# Patient Record
Sex: MALE | Race: WHITE | NOT HISPANIC OR LATINO | Employment: OTHER | URBAN - METROPOLITAN AREA
[De-identification: names, ages, dates, MRNs, and addresses within clinical notes are randomized per-mention and may not be internally consistent; named-entity substitution may affect disease eponyms.]

---

## 2017-01-14 ENCOUNTER — HOSPITAL ENCOUNTER (OUTPATIENT)
Dept: LAB | Facility: MEDICAL CENTER | Age: 82
End: 2017-01-14
Attending: NURSE PRACTITIONER
Payer: MEDICARE

## 2017-01-14 DIAGNOSIS — R53.1 GENERALIZED WEAKNESS: ICD-10-CM

## 2017-01-14 DIAGNOSIS — E11.9 CONTROLLED TYPE 2 DIABETES MELLITUS WITHOUT COMPLICATION, WITHOUT LONG-TERM CURRENT USE OF INSULIN (HCC): ICD-10-CM

## 2017-01-14 DIAGNOSIS — E46 PROTEIN CALORIE MALNUTRITION (HCC): ICD-10-CM

## 2017-01-14 LAB
ALBUMIN SERPL BCP-MCNC: 4.1 G/DL (ref 3.2–4.9)
ALBUMIN/GLOB SERPL: 1.1 G/DL
ALP SERPL-CCNC: 69 U/L (ref 30–99)
ALT SERPL-CCNC: 31 U/L (ref 2–50)
ANION GAP SERPL CALC-SCNC: 7 MMOL/L (ref 0–11.9)
AST SERPL-CCNC: 25 U/L (ref 12–45)
BASOPHILS # BLD AUTO: 0.08 K/UL (ref 0–0.12)
BASOPHILS NFR BLD AUTO: 1.6 % (ref 0–1.8)
BILIRUB SERPL-MCNC: 0.3 MG/DL (ref 0.1–1.5)
BUN SERPL-MCNC: 19 MG/DL (ref 8–22)
CALCIUM SERPL-MCNC: 9.6 MG/DL (ref 8.5–10.5)
CHLORIDE SERPL-SCNC: 97 MMOL/L (ref 96–112)
CO2 SERPL-SCNC: 28 MMOL/L (ref 20–33)
CREAT SERPL-MCNC: 1.4 MG/DL (ref 0.5–1.4)
EOSINOPHIL # BLD: 0.26 K/UL (ref 0–0.51)
EOSINOPHIL NFR BLD AUTO: 5.1 % (ref 0–6.9)
ERYTHROCYTE [DISTWIDTH] IN BLOOD BY AUTOMATED COUNT: 44 FL (ref 35.9–50)
EST. AVERAGE GLUCOSE BLD GHB EST-MCNC: 126 MG/DL
GLOBULIN SER CALC-MCNC: 3.9 G/DL (ref 1.9–3.5)
GLUCOSE SERPL-MCNC: 146 MG/DL (ref 65–99)
HBA1C MFR BLD: 6 % (ref 0–5.6)
HCT VFR BLD AUTO: 33.4 % (ref 42–52)
HGB BLD-MCNC: 10.6 G/DL (ref 14–18)
IMM GRANULOCYTES # BLD AUTO: 0.08 K/UL (ref 0–0.11)
IMM GRANULOCYTES NFR BLD AUTO: 1.6 % (ref 0–0.9)
LYMPHOCYTES # BLD: 1.14 K/UL (ref 1–4.8)
LYMPHOCYTES NFR BLD AUTO: 22.5 % (ref 22–41)
MCH RBC QN AUTO: 29.5 PG (ref 27–33)
MCHC RBC AUTO-ENTMCNC: 31.7 G/DL (ref 33.7–35.3)
MCV RBC AUTO: 93 FL (ref 81.4–97.8)
MONOCYTES # BLD: 0.63 K/UL (ref 0–0.85)
MONOCYTES NFR BLD AUTO: 12.4 % (ref 0–13.4)
NEUTROPHILS # BLD: 2.88 K/UL (ref 1.82–7.42)
NEUTROPHILS NFR BLD AUTO: 56.8 % (ref 44–72)
NRBC # BLD AUTO: 0 K/UL
NRBC BLD-RTO: 0 /100 WBC
PLATELET # BLD AUTO: 316 K/UL (ref 164–446)
PMV BLD AUTO: 9.1 FL (ref 9–12.9)
POTASSIUM SERPL-SCNC: 4.7 MMOL/L (ref 3.6–5.5)
PROT SERPL-MCNC: 8 G/DL (ref 6–8.2)
RBC # BLD AUTO: 3.59 M/UL (ref 4.7–6.1)
SODIUM SERPL-SCNC: 132 MMOL/L (ref 135–145)
WBC # BLD AUTO: 5.1 K/UL (ref 4.8–10.8)

## 2017-01-14 PROCEDURE — 85025 COMPLETE CBC W/AUTO DIFF WBC: CPT

## 2017-01-14 PROCEDURE — 36415 COLL VENOUS BLD VENIPUNCTURE: CPT

## 2017-01-14 PROCEDURE — 83036 HEMOGLOBIN GLYCOSYLATED A1C: CPT

## 2017-01-14 PROCEDURE — 80053 COMPREHEN METABOLIC PANEL: CPT

## 2017-01-19 DIAGNOSIS — G47.09 OTHER INSOMNIA: ICD-10-CM

## 2017-01-19 DIAGNOSIS — G89.29 OTHER CHRONIC PAIN: ICD-10-CM

## 2017-01-19 DIAGNOSIS — M25.50 MULTIPLE JOINT PAIN: ICD-10-CM

## 2017-01-19 RX ORDER — QUETIAPINE FUMARATE 300 MG/1
300 TABLET, FILM COATED ORAL EVERY EVENING
Qty: 90 TAB | Refills: 0 | Status: SHIPPED | OUTPATIENT
Start: 2017-01-19 | End: 2017-02-24 | Stop reason: SDUPTHER

## 2017-01-19 RX ORDER — OXYCODONE HYDROCHLORIDE 10 MG/1
10 TABLET ORAL EVERY 6 HOURS PRN
Qty: 90 TAB | Refills: 0 | Status: SHIPPED | OUTPATIENT
Start: 2017-01-19 | End: 2017-01-26 | Stop reason: SDUPTHER

## 2017-01-19 NOTE — TELEPHONE ENCOUNTER
Was the patient seen in the last year in this department? Yes     Does patient have an active prescription for medications requested? No     Received Request Via: Patient     [place]  Oxy-12/31/16

## 2017-01-21 ENCOUNTER — OFFICE VISIT (OUTPATIENT)
Dept: URGENT CARE | Facility: CLINIC | Age: 82
End: 2017-01-21
Payer: MEDICARE

## 2017-01-21 ENCOUNTER — APPOINTMENT (OUTPATIENT)
Dept: RADIOLOGY | Facility: IMAGING CENTER | Age: 82
End: 2017-01-21
Attending: PHYSICIAN ASSISTANT
Payer: MEDICARE

## 2017-01-21 VITALS
OXYGEN SATURATION: 94 % | HEART RATE: 96 BPM | DIASTOLIC BLOOD PRESSURE: 80 MMHG | TEMPERATURE: 97.7 F | SYSTOLIC BLOOD PRESSURE: 130 MMHG | RESPIRATION RATE: 16 BRPM
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
## 2017-01-21 DIAGNOSIS — S79.912A INJURY OF LEFT HIP, INITIAL ENCOUNTER: ICD-10-CM

## 2017-01-21 PROCEDURE — 73501 X-RAY EXAM HIP UNI 1 VIEW: CPT | Mod: TC,LT | Performed by: PHYSICIAN ASSISTANT

## 2017-01-21 PROCEDURE — 73501 X-RAY EXAM HIP UNI 1 VIEW: CPT | Mod: 26,LT | Performed by: PHYSICIAN ASSISTANT

## 2017-01-21 PROCEDURE — 99214 OFFICE O/P EST MOD 30 MIN: CPT | Performed by: PHYSICIAN ASSISTANT

## 2017-01-21 ASSESSMENT — ENCOUNTER SYMPTOMS
SENSORY CHANGE: 0
FALLS: 1
TINGLING: 0
FOCAL WEAKNESS: 1
CONSTITUTIONAL NEGATIVE: 1
NUMBNESS: 0

## 2017-01-21 NOTE — MR AVS SNAPSHOT
Santosh Casas   2017 3:10 PM   Office Visit   MRN: [id number]    Department:  [facility]   Dept Phone:  [phone]    Description:  Male : 1934   Provider:  Johnny Mack PA-C           Reason for Visit     Fall  x 5 days, fell and landed on Lt. side.  Pain on Lt. hip and Lt. upper leg      Allergies as of 2017     Allergen Noted Reactions    Nkda [No Known Drug Allergy] 2010         You were diagnosed with     Fall, initial encounter   [123755]       Injury of left hip, initial encounter   [566114]         Vital Signs     Blood Pressure Pulse Temperature Respirations Oxygen Saturation Smoking Status    130/80 mmHg 96 36.5 °C (97.7 °F) 16 94% Former Smoker      Basic Information     Date Of Birth Sex Race Ethnicity Preferred Language    1934 Male White Non- English      Your appointments     2017  1:00 PM   Established Patient with DORINDA Dao   [address] ([address])    [address] [place] [address] [address]  [facility] [address]   [phone]           You will be receiving a confirmation call a few days before your appointment from our automated call confirmation system.              Problem List              ICD-10-CM Priority Class Noted - Resolved    GOUT    6/15/2009 - Present    Depression F32.9 Low  6/15/2009 - Present    Hypothyroidism E03.9 Low  2009 - Present    Anxiety F41.9   2010 - Present    GERD (gastroesophageal reflux disease) K21.9   2010 - Present    Hypertension I10 Low  2011 - Present    Squamous cell cancer of tongue (CMS-HCC) C02.9   2012 - Present    Insomnia G47.00   2016 - Present    Gait disorder R26.9   2016 - Present    Protein calorie malnutrition (CMS-[facility]) E46   2016 - Present    Controlled type 2 diabetes mellitus without complication, without long-term current use of insulin (CMS-[facility]) E11.9   2016 - Present      Health Maintenance        Date Due  Completion Dates    IMM DTaP/Tdap/Td Vaccine (1 - Tdap) 4/27/1953 ---    IMM ZOSTER VACCINE 4/27/1994 ---    IMM PNEUMOCOCCAL 65+ (ADULT) LOW/MEDIUM RISK SERIES (2 of 2 - PCV13) 11/23/2012 11/23/2011    RETINAL SCREENING 3/20/2015 3/20/2014, 3/20/2014    FASTING LIPID PROFILE 6/8/2016 6/8/2015, 6/23/2014, 4/12/2011, 11/9/2009, 1/12/2006    URINE ACR / MICROALBUMIN 6/8/2016 6/8/2015, 6/23/2014, 12/12/2012, 7/17/2009    IMM INFLUENZA (1) 9/1/2016 11/23/2011    A1C SCREENING 7/14/2017 1/14/2017, 8/30/2016, 1/6/2016, 6/8/2015, 6/23/2014, 12/12/2012, 11/23/2011, 4/12/2011, 2/24/2010, 11/9/2009, 7/17/2009, 1/12/2006, 1/11/2006    SERUM CREATININE 1/14/2018 1/14/2017, 11/8/2016, 6/8/2015, 8/22/2013, 8/21/2013, 7/12/2013, 12/12/2012, 12/11/2012, 5/15/2012, 5/15/2012, 11/28/2011, 11/23/2011, 11/21/2011, 2/24/2010, 12/27/2009, 1/12/2006, 1/11/2006, 1/10/2006    COLONOSCOPY 6/16/2024 6/16/2014 (N/S)    Override on 6/16/2014: (N/S)            Current Immunizations     Influenza TIV (IM) 11/23/2011 11:35 AM    Pneumococcal polysaccharide vaccine (PPSV-23) 11/23/2011 11:45 AM      Below and/or attached are the medications your provider expects you to take. Review all of your home medications and newly ordered medications with your provider and/or pharmacist. Follow medication instructions as directed by your provider and/or pharmacist. Please keep your medication list with you and share with your provider. Update the information when medications are discontinued, doses are changed, or new medications (including over-the-counter products) are added; and carry medication information at all times in the event of emergency situations     Allergies:  NKDA - (reactions not documented)               Medications  Valid as of: January 21, 2017 -  5:02 PM    Generic Name Brand Name Tablet Size Instructions for use    ALPRAZolam (Tab) XANAX 1 MG Take 1 Tab by mouth 3 times a day as needed for Anxiety.        DULoxetine HCl (Cap DR  Particles) CYMBALTA 60 MG Take 1 Cap by mouth every day.        Ferrous Sulfate (Tab) ferrous sulfate 325 (65 FE) MG Take 1 Tab by mouth every day. With 250 mg vit C and a stool softner such as colace        Levothyroxine Sodium (Tab) SYNTHROID 50 MCG Take 1 Tab by mouth Every morning on an empty stomach.        MetFORMIN HCl (Tab) GLUCOPHAGE 500 MG TAKE ONE TABLET BY MOUTH TWICE A DAY WITH MEALS        OxyCODONE HCl (Tab) ROXICODONE 10 MG Take 1 Tab by mouth every 6 hours as needed for Severe Pain.        Polyethylene Glycol 3350 (Pack) MIRALAX  Take 17 g by mouth 1 time daily as needed. Indications: Constipation        QUEtiapine Fumarate (Tab) SEROQUEL 300 MG Take 1 Tab by mouth every evening.        Sertraline HCl (Tab) ZOLOFT 100 MG Take 2 Tabs by mouth every day. For depression, at night        .                 Medicines prescribed today were sent to:     [id number] - [place], [place] - [address] [facility]    [id number] [place] [address]    Phone: [phone] Fax: [phone]    Open 24 Hours?: No      Medication refill instructions:       If your prescription bottle indicates you have medication refills left, it is not necessary to call your provider’s office. Please contact your pharmacy and they will refill your medication.    If your prescription bottle indicates you do not have any refills left, you may request refills at any time through one of the following ways: The online [employer] system (except Urgent Care), by calling your provider’s office, or by asking your pharmacy to contact your provider’s office with a refill request. Medication refills are processed only during regular business hours and may not be available until the next business day. Your provider may request additional information or to have a follow-up visit with you prior to refilling your medication.   *Please Note: Medication refills are assigned a new Rx number when refilled electronically. Your pharmacy may indicate  that no refills were authorized even though a new prescription for the same medication is available at the pharmacy. Please request the medicine by name with the pharmacy before contacting your provider for a refill.        Your To Do List     Future Labs/Procedures Complete By Expires    DX-HIP-UNILATERAL-WITH PELVIS-1 VIEW LEFT  As directed 1/21/2018      Referral     A referral request has been sent to our patient care coordination department. Please allow 3-5 business days for us to process this request and contact you either by phone or mail. If you do not hear from us by the 5th business day, please call us at [phone].        Other Notes About Your Plan     Please Assess and Status Chronic Disease from Current and Prior Visits.     Query: Please Assess the following Diagnosis    1. Suggested code 707.14 Ulcer of heel and midfoot. Diagnosis 11/12/2014 per Dr German Bush see Epic note entry of 11/230/2014 dated 11/12/2014. Please status and document if ulcer remains/treatment.     2.Suggested Code 296.30 Major depressive disorder, recurrent episode, unspecified degree. Please state if depression mild, moderate or severe. Long term history of Depression patient taking Xanax from 03/12/2009 with last refill 07/09/2015. Remeron 01/16/2009 with last Refill 06/10/2015 and Seroquel 01/05/2011 with last refill 06/12/2015. Please status diagnosis for these medicaitons.     3. 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

## 2017-01-26 ENCOUNTER — OFFICE VISIT (OUTPATIENT)
Dept: MEDICAL GROUP | Facility: MEDICAL CENTER | Age: 82
End: 2017-01-26
Payer: MEDICARE

## 2017-01-26 VITALS
SYSTOLIC BLOOD PRESSURE: 124 MMHG | HEIGHT: 72 IN | OXYGEN SATURATION: 97 % | BODY MASS INDEX: 17.88 KG/M2 | WEIGHT: 132 LBS | TEMPERATURE: 97.3 F | DIASTOLIC BLOOD PRESSURE: 68 MMHG | RESPIRATION RATE: 12 BRPM | HEART RATE: 102 BPM

## 2017-01-26 DIAGNOSIS — M25.552 PAIN IN LEFT HIP: ICD-10-CM

## 2017-01-26 DIAGNOSIS — D50.9 IRON DEFICIENCY ANEMIA: ICD-10-CM

## 2017-01-26 DIAGNOSIS — F32.89 OTHER DEPRESSION: ICD-10-CM

## 2017-01-26 DIAGNOSIS — E87.1 HYPONATREMIA: ICD-10-CM

## 2017-01-26 PROCEDURE — G8432 DEP SCR NOT DOC, RNG: HCPCS | Performed by: NURSE PRACTITIONER

## 2017-01-26 PROCEDURE — G8484 FLU IMMUNIZE NO ADMIN: HCPCS | Performed by: NURSE PRACTITIONER

## 2017-01-26 PROCEDURE — G8419 CALC BMI OUT NRM PARAM NOF/U: HCPCS | Performed by: NURSE PRACTITIONER

## 2017-01-26 PROCEDURE — 3288F FALL RISK ASSESSMENT DOCD: CPT | Performed by: NURSE PRACTITIONER

## 2017-01-26 PROCEDURE — 4040F PNEUMOC VAC/ADMIN/RCVD: CPT | Performed by: NURSE PRACTITIONER

## 2017-01-26 PROCEDURE — 1036F TOBACCO NON-USER: CPT | Performed by: NURSE PRACTITIONER

## 2017-01-26 PROCEDURE — 1100F PTFALLS ASSESS-DOCD GE2>/YR: CPT | Performed by: NURSE PRACTITIONER

## 2017-01-26 PROCEDURE — 0518F FALL PLAN OF CARE DOCD: CPT | Mod: 8P | Performed by: NURSE PRACTITIONER

## 2017-01-26 PROCEDURE — 99214 OFFICE O/P EST MOD 30 MIN: CPT | Performed by: NURSE PRACTITIONER

## 2017-01-26 RX ORDER — OXYCODONE HYDROCHLORIDE 10 MG/1
10 TABLET ORAL EVERY 4 HOURS PRN
Qty: 112 TAB | Refills: 0 | Status: SHIPPED | OUTPATIENT
Start: 2017-01-26 | End: 2017-03-17 | Stop reason: SDUPTHER

## 2017-01-26 NOTE — MR AVS SNAPSHOT
Santosh Casas   2017 1:00 PM   Office Visit   MRN: [id number]    Department:  [address]   Dept Phone:  [phone]    Description:  Male : 1934   Provider:  DORINDA Dao           Allergies as of 2017     Allergen Noted Reactions    Nkda [No Known Drug Allergy] 2010         You were diagnosed with     Pain in left hip   [901806]       Multiple joint pain   [499620]       Other chronic pain   [338.29.ICD-9-CM]         Vital Signs     Blood Pressure Pulse Temperature Respirations Height Weight    124/68 mmHg 102 36.3 °C (97.3 °F) 12 1.829 m (6') 59.875 kg (132 lb)    Body Mass Index Oxygen Saturation Smoking Status             17.90 kg/m2 97% Former Smoker         Basic Information     Date Of Birth Sex Race Ethnicity Preferred Language    1934 Male White Non- English      Problem List              ICD-10-CM Priority Class Noted - Resolved    GOUT    6/15/2009 - Present    Depression F32.9 Low  6/15/2009 - Present    Hypothyroidism E03.9 Low  2009 - Present    Anxiety F41.9   2010 - Present    GERD (gastroesophageal reflux disease) K21.9   2010 - Present    Hypertension I10 Low  2011 - Present    Squamous cell cancer of tongue (CMS-HCC) C02.9   2012 - Present    Insomnia G47.00   2016 - Present    Gait disorder R26.9   2016 - Present    Protein calorie malnutrition (CMS-HCC) E46   2016 - Present    Controlled type 2 diabetes mellitus without complication, without long-term current use of insulin (CMS-HCC) E11.9   2016 - Present      Health Maintenance        Date Due Completion Dates    IMM DTaP/Tdap/Td Vaccine (1 - Tdap) 1953 ---    IMM ZOSTER VACCINE 1994 ---    IMM PNEUMOCOCCAL 65+ (ADULT) LOW/MEDIUM RISK SERIES (2 of 2 - PCV13) 2012    RETINAL SCREENING 3/20/2015 3/20/2014, 3/20/2014    FASTING LIPID PROFILE 2016, 2014, 2011, 2009, 2006    URINE ACR / MICROALBUMIN 6/8/2016 6/8/2015, 6/23/2014, 12/12/2012, 7/17/2009    IMM INFLUENZA (1) 9/1/2016 11/23/2011    A1C SCREENING 7/14/2017 1/14/2017, 8/30/2016, 1/6/2016, 6/8/2015, 6/23/2014, 12/12/2012, 11/23/2011, 4/12/2011, 2/24/2010, 11/9/2009, 7/17/2009, 1/12/2006, 1/11/2006    SERUM CREATININE 1/14/2018 1/14/2017, 11/8/2016, 6/8/2015, 8/22/2013, 8/21/2013, 7/12/2013, 12/12/2012, 12/11/2012, 5/15/2012, 5/15/2012, 11/28/2011, 11/23/2011, 11/21/2011, 2/24/2010, 12/27/2009, 1/12/2006, 1/11/2006, 1/10/2006    COLONOSCOPY 6/16/2024 6/16/2014 (N/S)    Override on 6/16/2014: (N/S)            Current Immunizations     Influenza TIV (IM) 11/23/2011 11:35 AM    Pneumococcal polysaccharide vaccine (PPSV-23) 11/23/2011 11:45 AM      Below and/or attached are the medications your provider expects you to take. Review all of your home medications and newly ordered medications with your provider and/or pharmacist. Follow medication instructions as directed by your provider and/or pharmacist. Please keep your medication list with you and share with your provider. Update the information when medications are discontinued, doses are changed, or new medications (including over-the-counter products) are added; and carry medication information at all times in the event of emergency situations     Allergies:  NKDA - (reactions not documented)               Medications  Valid as of: January 26, 2017 -  1:20 PM    Generic Name Brand Name Tablet Size Instructions for use    DULoxetine HCl (Cap DR Particles) CYMBALTA 60 MG Take 1 Cap by mouth every day.        Ferrous Sulfate (Tab) ferrous sulfate 325 (65 FE) MG Take 1 Tab by mouth every day. With 250 mg vit C and a stool softner such as colace        Levothyroxine Sodium (Tab) SYNTHROID 50 MCG Take 1 Tab by mouth Every morning on an empty stomach.        MetFORMIN HCl (Tab) GLUCOPHAGE 500 MG TAKE ONE TABLET BY MOUTH TWICE A DAY WITH MEALS        OxyCODONE HCl (Tab) ROXICODONE 10 MG  Take 1 Tab by mouth every four hours as needed for Severe Pain.        Polyethylene Glycol 3350 (Pack) MIRALAX  Take 17 g by mouth 1 time daily as needed. Indications: Constipation        QUEtiapine Fumarate (Tab) SEROQUEL 300 MG Take 1 Tab by mouth every evening.        .                 Medicines prescribed today were sent to:     [facility] - [place], [place] - [address] [facility]    [id number] [place] [address] [place] [address]    Phone: [phone] Fax: [phone]    Open 24 Hours?: No      Medication refill instructions:       If your prescription bottle indicates you have medication refills left, it is not necessary to call your provider’s office. Please contact your pharmacy and they will refill your medication.    If your prescription bottle indicates you do not have any refills left, you may request refills at any time through one of the following ways: The online [employer] system (except Urgent Care), by calling your provider’s office, or by asking your pharmacy to contact your provider’s office with a refill request. Medication refills are processed only during regular business hours and may not be available until the next business day. Your provider may request additional information or to have a follow-up visit with you prior to refilling your medication.   *Please Note: Medication refills are assigned a new Rx number when refilled electronically. Your pharmacy may indicate that no refills were authorized even though a new prescription for the same medication is available at the pharmacy. Please request the medicine by name with the pharmacy before contacting your provider for a refill.        Other Notes About Your Plan     Please Assess and Status Chronic Disease from Current and Prior Visits.     Query: Please Assess the following Diagnosis    1. Suggested code 707.14 Ulcer of heel and midfoot. Diagnosis 11/12/2014 per Dr German Bush see Epic note entry of 11/230/2014 dated 11/12/2014. Please status  and document if ulcer remains/treatment.     2.Suggested Code 296.30 Major depressive disorder, recurrent episode, unspecified degree. Please state if depression mild, moderate or severe. Long term history of Depression patient taking Xanax from 03/12/2009 with last refill 07/09/2015. Remeron 01/16/2009 with last Refill 06/10/2015 and Seroquel 01/05/2011 with last refill 06/12/2015. Please status diagnosis for these medicaitons.     3. 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

## 2017-01-27 NOTE — PROGRESS NOTES
CC  f/u    HPI  Santosh is an 82-year-old established male here to follow up with his wife Ailyn. He is here to follow-up on a few things:  #1-hyponatremia: Chronic issue. Working on increasing his salt through eating soup. He is eating one can of soup per day. He denies any muscle cramps but always feels weak. Denies heart palpitations or chest pain. Denies diarrhea.  #2-iron deficient anemia: Chronic issue. Struggles taking iron because of constipation. Does not enjoy iron-containing foods. Always feels cold and tired. Denies feeling short of breath but he is very physically inactive.  #3-left hip pain: Patient fell a few weeks ago at home, ground level fall. He had an x-ray through urgent care that was negative for fracture. He continues with pain to his left hip during walking and any type of weightbearing. He does feel his hip pain has improved slightly over the past week. He is only walking with a walker. He denies any leg numbness or tingling. He is taking oxycodone every 6 hours but states that his pain gets pretty severe after 3-4 hours and he is requesting an increased frequency in allowance of taking oxycodone. He denies any constipation that he previously experienced with morphine.  #4-depression: Chronic issue. States that he has not had any changes in his mood. Continues on Seroquel at night and Cymbalta with dinner. He states he is very bored in life and feels apathetic. Denies any suicidal or homicidal ideations. Enjoys reading and his grandchildren.      Past medical, surgical, family, and social history is reviewed and updated in Epic chart by me today.   Medications and allergies reviewed and updated in Epic chart by me today.     ROS:   As documented in history of present illness above    Exam:  Blood pressure 124/68, pulse 102, temperature 36.3 °C (97.3 °F), resp. rate 12, height 1.829 m (6'), weight 59.875 kg (132 lb), SpO2 97 %.  Constitutional: elderly male, Alert, no distress, plus 3 vital  signs  Skin:  Warm, dry, no rashes invisible areas  Eye: Equal, round and reactive, conjunctiva clear  ENMT: Lips without lesions  Neck: Trachea midline  Respiratory: Unlabored respiration, lungs clear to auscultation, no wheezes, no rhonchi  Cardiovascular: Normal rate and rhythm  Abdomen: Soft, nontender  M/s: He experiences tenderness with palpation to his left hip and lateral , proximal femur. There is no overlying bruising, deformity or swelling to his left hip or femur area. He walks with a slight limp with his walker.  Psych: Alert, pleasant, well-groomed, normal affect    A/P:  1. Pain in left hip  -Is difficult for his wife to come  a medication and he was given a prescription to fill in February today. Offered physical therapy referral which he declined. Discussed heat, ice, elevation, rest, soft cushions to sit on. Recommend a follow-up in 2-3 weeks if pain is not dramatically improving.  - oxycodone immediate release (ROXICODONE) 10 MG immediate release tablet; Take 1 Tab by mouth every four hours as needed for Severe Pain.  Dispense: 112 Tab; Refill: 0    2. Iron deficiency anemia  -Again encouraged the patient to try at least one 325 mg iron replacement at night prior to bedtime with vitamin C. Reviewed iron rich food sources with him.    3. Hyponatremia  -Encouraged him to increase his soup intake to 2 cans per day which would also help his weight.    4. Other depression  -Stable. He does seem fairly fatalistic. He is agreeable to continuing on Seroquel and Cymbalta. He does have a history of oral cancer and he states he has no desire to investigate this any further. He is agreeable to following up periodically. He does not have any suicidal or homicidal ideations. Encouraged him to spend as much time as possible with grandchildren as this does seem to make him happy.

## 2017-02-24 ENCOUNTER — TELEPHONE (OUTPATIENT)
Dept: MEDICAL GROUP | Facility: PHYSICIAN GROUP | Age: 82
End: 2017-02-24

## 2017-02-24 ENCOUNTER — OFFICE VISIT (OUTPATIENT)
Dept: MEDICAL GROUP | Facility: PHYSICIAN GROUP | Age: 82
End: 2017-02-24
Payer: MEDICARE

## 2017-02-24 VITALS
RESPIRATION RATE: 16 BRPM | HEIGHT: 72 IN | DIASTOLIC BLOOD PRESSURE: 70 MMHG | WEIGHT: 134 LBS | BODY MASS INDEX: 18.15 KG/M2 | SYSTOLIC BLOOD PRESSURE: 122 MMHG | HEART RATE: 90 BPM | OXYGEN SATURATION: 96 % | TEMPERATURE: 97.6 F

## 2017-02-24 DIAGNOSIS — E11.9 CONTROLLED TYPE 2 DIABETES MELLITUS WITHOUT COMPLICATION, WITHOUT LONG-TERM CURRENT USE OF INSULIN (HCC): ICD-10-CM

## 2017-02-24 DIAGNOSIS — F41.9 ANXIETY: ICD-10-CM
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
## 2017-02-24 DIAGNOSIS — E03.8 OTHER SPECIFIED HYPOTHYROIDISM: ICD-10-CM

## 2017-02-24 DIAGNOSIS — G89.29 CHRONIC MIDLINE LOW BACK PAIN WITHOUT SCIATICA: ICD-10-CM

## 2017-02-24 DIAGNOSIS — K59.03 DRUG-INDUCED CONSTIPATION: ICD-10-CM

## 2017-02-24 DIAGNOSIS — G47.09 OTHER INSOMNIA: ICD-10-CM

## 2017-02-24 DIAGNOSIS — I10 ESSENTIAL HYPERTENSION: ICD-10-CM

## 2017-02-24 DIAGNOSIS — C02.9 SQUAMOUS CELL CANCER OF TONGUE (HCC): ICD-10-CM

## 2017-02-24 DIAGNOSIS — K21.9 GASTROESOPHAGEAL REFLUX DISEASE, ESOPHAGITIS PRESENCE NOT SPECIFIED: ICD-10-CM

## 2017-02-24 DIAGNOSIS — F32.89 OTHER DEPRESSION: ICD-10-CM

## 2017-02-24 DIAGNOSIS — M54.50 CHRONIC MIDLINE LOW BACK PAIN WITHOUT SCIATICA: ICD-10-CM

## 2017-02-24 PROCEDURE — 4040F PNEUMOC VAC/ADMIN/RCVD: CPT | Performed by: NURSE PRACTITIONER

## 2017-02-24 PROCEDURE — 3288F FALL RISK ASSESSMENT DOCD: CPT | Performed by: NURSE PRACTITIONER

## 2017-02-24 PROCEDURE — 99214 OFFICE O/P EST MOD 30 MIN: CPT | Performed by: NURSE PRACTITIONER

## 2017-02-24 PROCEDURE — 0518F FALL PLAN OF CARE DOCD: CPT | Mod: 8P | Performed by: NURSE PRACTITIONER

## 2017-02-24 PROCEDURE — G8432 DEP SCR NOT DOC, RNG: HCPCS | Performed by: NURSE PRACTITIONER

## 2017-02-24 PROCEDURE — 1100F PTFALLS ASSESS-DOCD GE2>/YR: CPT | Performed by: NURSE PRACTITIONER

## 2017-02-24 PROCEDURE — G8484 FLU IMMUNIZE NO ADMIN: HCPCS | Performed by: NURSE PRACTITIONER

## 2017-02-24 PROCEDURE — 1036F TOBACCO NON-USER: CPT | Performed by: NURSE PRACTITIONER

## 2017-02-24 PROCEDURE — G8419 CALC BMI OUT NRM PARAM NOF/U: HCPCS | Performed by: NURSE PRACTITIONER

## 2017-02-24 RX ORDER — FAMOTIDINE 20 MG/1
20 TABLET, FILM COATED ORAL 2 TIMES DAILY
Qty: 60 TAB | Refills: 3 | Status: SHIPPED | OUTPATIENT
Start: 2017-02-24 | End: 2017-06-16 | Stop reason: SDUPTHER

## 2017-02-24 RX ORDER — DOCUSATE SODIUM 100 MG/1
100 CAPSULE, LIQUID FILLED ORAL 2 TIMES DAILY
Qty: 180 CAP | Refills: 3 | Status: SHIPPED | OUTPATIENT
Start: 2017-02-24 | End: 2018-06-01 | Stop reason: SDUPTHER

## 2017-02-24 RX ORDER — QUETIAPINE FUMARATE 300 MG/1
300 TABLET, FILM COATED ORAL EVERY EVENING
Qty: 90 TAB | Refills: 3 | Status: SHIPPED | OUTPATIENT
Start: 2017-02-24 | End: 2017-03-17 | Stop reason: SDUPTHER

## 2017-02-24 ASSESSMENT — PAIN SCALES - GENERAL: PAINLEVEL: NO PAIN

## 2017-02-24 NOTE — TELEPHONE ENCOUNTER
Santosh had a fall today in our public restroom.  He was attended too by Lori Chavez and his PCP, Sahil Miranda.  Event recorded in [facility] as a Patient Fall Event.  Patient is currently being monitored before he will be released from the clinic.

## 2017-02-24 NOTE — ASSESSMENT & PLAN NOTE
Most recent hemoglobin A1c is 6. Patient is taking metformin 500 mg daily this that this is working well for her symptoms and denies side effects on this medication. Discussed with patient symptoms of high and low blood pressure. Patient states that he has not noticed these. Patient does not take his blood sugar and refuses to do so. Plan to obtain labs.

## 2017-02-24 NOTE — ASSESSMENT & PLAN NOTE
Patient states he was following up with oncology, but a couple of years ago he canceled a follow-up appointment and has not been seen since. Patient denies swelling in his throat, difficulty swallowing, or other concerning symptoms refuses referral to oncology at this time. Patient states he believes he was cancer-free as he had completed treatment.

## 2017-02-24 NOTE — MR AVS SNAPSHOT
Santosh Deluna Augusto   2017 10:20 AM   Office Visit   MRN: [id number]    Department:  [facility]   Dept Phone:  [phone]    Description:  Male : 1934   Provider:  GARCIA Valentine           Reason for Visit     Establish Care New Patient       Allergies as of 2017     Allergen Noted Reactions    Nkda [No Known Drug Allergy] 2010         You were diagnosed with     Anxiety   [130856]       Gastroesophageal reflux disease, esophagitis presence not specified   [3300272]       Chronic midline low back pain without sciatica   [7513291]       Other specified hypothyroidism   [5580617]       Essential hypertension   [0431718]       Controlled type 2 diabetes mellitus without complication, without long-term current use of insulin (CMS-HCC)   [3535716]         Vital Signs     Blood Pressure Pulse Temperature Respirations Height Weight    122/70 mmHg 90 36.4 °C (97.6 °F) 16 1.829 m (6') 60.782 kg (134 lb)    Body Mass Index Oxygen Saturation Smoking Status             18.17 kg/m2 96% Former Smoker         Basic Information     Date Of Birth Sex Race Ethnicity Preferred Language    1934 Male White Non- English      Your appointments     Mar 17, 2017  9:40 AM   Established Patient with GARCIA Valentine   [facility] (--)    [address]  [address]  [facility] [id number] [phone]           You will be receiving a confirmation call a few days before your appointment from our automated call confirmation system.              Problem List              ICD-10-CM Priority Class Noted - Resolved    GOUT    6/15/2009 - Present    Depression F32.9 Low  6/15/2009 - Present    Hypothyroidism E03.9 Low  2009 - Present    Anxiety F41.9   2010 - Present    GERD (gastroesophageal reflux disease) K21.9   2010 - Present    Hypertension I10 Low  2011 - Present    Squamous cell cancer of tongue (CMS-HCC) C02.9   2012 -  Present    Insomnia G47.00   4/6/2016 - Present    Gait disorder R26.9   5/11/2016 - Present    Protein calorie malnutrition (CMS-HCC) E46   12/1/2016 - Present    Controlled type 2 diabetes mellitus without complication, without long-term current use of insulin (CMS-HCC) E11.9   12/1/2016 - Present    Chronic midline low back pain without sciatica M54.5, G89.29   2/24/2017 - Present      Health Maintenance        Date Due Completion Dates    IMM DTaP/Tdap/Td Vaccine (1 - Tdap) 4/27/1953 ---    IMM ZOSTER VACCINE 4/27/1994 ---    IMM PNEUMOCOCCAL 65+ (ADULT) LOW/MEDIUM RISK SERIES (2 of 2 - PCV13) 11/23/2012 11/23/2011    RETINAL SCREENING 3/20/2015 3/20/2014, 3/20/2014    FASTING LIPID PROFILE 6/8/2016 6/8/2015, 6/23/2014, 4/12/2011, 11/9/2009, 1/12/2006    URINE ACR / MICROALBUMIN 6/8/2016 6/8/2015, 6/23/2014, 12/12/2012, 7/17/2009    IMM INFLUENZA (1) 2/1/2018 (Originally 9/1/2016) 11/23/2011    A1C SCREENING 7/14/2017 1/14/2017, 8/30/2016, 1/6/2016, 6/8/2015, 6/23/2014, 12/12/2012, 11/23/2011, 4/12/2011, 2/24/2010, 11/9/2009, 7/17/2009, 1/12/2006, 1/11/2006    SERUM CREATININE 1/14/2018 1/14/2017, 11/8/2016, 6/8/2015, 8/22/2013, 8/21/2013, 7/12/2013, 12/12/2012, 12/11/2012, 5/15/2012, 5/15/2012, 11/28/2011, 11/23/2011, 11/21/2011, 2/24/2010, 12/27/2009, 1/12/2006, 1/11/2006, 1/10/2006    COLONOSCOPY 6/16/2024 6/16/2014 (N/S)    Override on 6/16/2014: (N/S)            Current Immunizations     Influenza TIV (IM) 11/23/2011 11:35 AM    Pneumococcal polysaccharide vaccine (PPSV-23) 11/23/2011 11:45 AM      Below and/or attached are the medications your provider expects you to take. Review all of your home medications and newly ordered medications with your provider and/or pharmacist. Follow medication instructions as directed by your provider and/or pharmacist. Please keep your medication list with you and share with your provider. Update the information when medications are discontinued, doses are changed, or new  medications (including over-the-counter products) are added; and carry medication information at all times in the event of emergency situations     Allergies:  NKDA - (reactions not documented)               Medications  Valid as of: February 24, 2017 - 11:06 AM    Generic Name Brand Name Tablet Size Instructions for use    Docusate Sodium (Cap) COLACE 100 MG Take 1 Cap by mouth 2 times a day.        DULoxetine HCl (Cap DR Particles) CYMBALTA 60 MG Take 1 Cap by mouth every day.        Famotidine (Tab) PEPCID 20 MG Take 1 Tab by mouth 2 times a day.        Ferrous Sulfate (Tab) ferrous sulfate 325 (65 FE) MG Take 1 Tab by mouth every day. With 250 mg vit C and a stool softner such as colace        Levothyroxine Sodium (Tab) SYNTHROID 50 MCG Take 1 Tab by mouth Every morning on an empty stomach.        MetFORMIN HCl (Tab) GLUCOPHAGE 500 MG Take 1 Tab by mouth every day.        OxyCODONE HCl (Tab) ROXICODONE 10 MG Take 1 Tab by mouth every four hours as needed for Severe Pain.        Psyllium (Pack) METAMUCIL 58.12 % Take 1 Packet by mouth every day.        QUEtiapine Fumarate (Tab) SEROQUEL 300 MG Take 1 Tab by mouth every evening.        .                 Medicines prescribed today were sent to:     [id number] - [place], [place] - [address] [address] [address]    Phone: [phone] Fax: [phone]    Open 24 Hours?: No      Medication refill instructions:       If your prescription bottle indicates you have medication refills left, it is not necessary to call your provider’s office. Please contact your pharmacy and they will refill your medication.    If your prescription bottle indicates you do not have any refills left, you may request refills at any time through one of the following ways: The online [facility] system (except Urgent Care), by calling your provider’s office, or by asking your pharmacy to contact your provider’s office with a refill request. Medication refills are  processed only during regular business hours and may not be available until the next business day. Your provider may request additional information or to have a follow-up visit with you prior to refilling your medication.   *Please Note: Medication refills are assigned a new Rx number when refilled electronically. Your pharmacy may indicate that no refills were authorized even though a new prescription for the same medication is available at the pharmacy. Please request the medicine by name with the pharmacy before contacting your provider for a refill.        Your To Do List     Future Labs/Procedures Complete By Expires    CBC WITH DIFFERENTIAL  As directed 2/24/2018    COMP METABOLIC PANEL  As directed 2/24/2018    LIPID PROFILE  As directed 2/24/2018    MICROALBUMIN CREAT RATIO URINE  As directed 2/24/2018    TSH  As directed 2/24/2018      Referral     A referral request has been sent to our patient care coordination department. Please allow 3-5 business days for us to process this request and contact you either by phone or mail. If you do not hear from us by the 5th business day, please call us at [phone].        Other Notes About Your Plan     Please Assess and Status Chronic Disease from Current and Prior Visits.     Query: Please Assess the following Diagnosis    1. Suggested code 707.14 Ulcer of heel and midfoot. Diagnosis 11/12/2014 per Dr German Bush see Epic note entry of 11/230/2014 dated 11/12/2014. Please status and document if ulcer remains/treatment.     2.Suggested Code 296.30 Major depressive disorder, recurrent episode, unspecified degree. Please state if depression mild, moderate or severe. Long term history of Depression patient taking Xanax from 03/12/2009 with last refill 07/09/2015. Remeron 01/16/2009 with last Refill 06/10/2015 and Seroquel 01/05/2011 with last refill 06/12/2015. Please status diagnosis for these medicaitons.     3. 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

## 2017-02-24 NOTE — ASSESSMENT & PLAN NOTE
Patient continues to have some symptoms of depression including lack of enjoyment indicated activities. Denies suicidal or homicidal ideation. Does make jokes during exam.

## 2017-02-24 NOTE — ASSESSMENT & PLAN NOTE
Chronic in Nature. Stable. Taking seroquel/cymbalta. Symptoms controlled. Denies side effects from medication. Denies constant worrying, irritation or agitation. Patient is no longer taking Xanax with regard to concern for falling. Patient refuses for referral to psychiatry for further management of anxiety without sedating medication. Will discuss this further at follow-up.

## 2017-02-24 NOTE — ASSESSMENT & PLAN NOTE
Chronic in nature. Patient has been seen by gastroenterology in the past for this issue patient is referred to gastroenterology as he states he is having GI bleeding, denies abdominal pain, epigastric pain, is having some dizziness denies nausea or vomiting, denies belching.  States that anesthetic or specific foods make symptoms worse, Tums improves symptoms. Increasing over last few weeks, not taking medication. Pepcid 20 mg twice daily 30 minutes before meals prescribed at this time.

## 2017-02-24 NOTE — ASSESSMENT & PLAN NOTE
Chronic in nature. Related to fall. Patient states he was seeing ortho with regard to lumbar compression fracture, but did not want to continue following with orthopedics. Patient has been taking oxycodone 10 mg 6 times a day, increased dizziness and falling more frequently this may be related to polypharmacy. Patient's wife is also very adamant that he needs to continue taking this for both pain medication, despite counseling him he may reduce his dose or frequency of his dosing. Patient is referred to pain management.

## 2017-02-24 NOTE — ASSESSMENT & PLAN NOTE
Patient fell in the clinic while using the bathroom prior to leaving. States he had onset on the wall. Denies Headache, blurry vision, dizziness, shortness of breath, palpitations, pain, and has had her neck. Small bruise located base of right palm. PERRLA, neuro exam is within normal limits. Patient is instructed to call if his symptoms including headache, dizziness, weakness, pain, or increasing in the emergency room for these symptoms.

## 2017-02-24 NOTE — ASSESSMENT & PLAN NOTE
Chronic in nature. Patient is using Seroquel at night as good results. States he is taking better pain awake during the day, and is having less fatigue.

## 2017-02-24 NOTE — Clinical Note
[employer]  ALEXANDER ValentinePRoselynRCHEVY  [address]  [place] [address]  Fax: [phone]   Authorization for Release/Disclosure of   Protected Health Information   Name: RAJAT CASAS : 1934 SSN: [id number]   Address: [address] [place], [place] [address] [address] Phone:    [phone] (home)    I authorize the entity listed below to release/disclose the PHI below to:   [employer] [facility]/KASI Valentine.P.RCHEVY and CHUCK ValentineRCHEVY   Provider or Entity Name:  Dr. Aceves   Address   City, [facility]   Phone:      Fax:     Reason for request: continuity of care   Information to be released:    [  ] LAST COLONOSCOPY,  including any PATH REPORT and follow-up  [  ] LAST FIT/COLOGUARD RESULT [  ] LAST DEXA  [  ] LAST MAMMOGRAM  [  ] LAST PAP  [  ] LAST LABS [x] RETINA EXAM REPORT  [  ] IMMUNIZATION RECORDS  [] Release all info      [  ] Check here and initial the line next to each item to release ALL health information INCLUDING  _____ Care and treatment for drug and / or alcohol abuse  _____ HIV testing, infection status, or AIDS  _____ Genetic Testing    DATES OF SERVICE OR TIME PERIOD TO BE DISCLOSED: _____________  I understand and acknowledge that:  * This Authorization may be revoked at any time by you in writing, except if your health information has already been used or disclosed.  * Your health information that will be used or disclosed as a result of you signing this authorization could be re-disclosed by the recipient. If this occurs, your re-disclosed health information may no longer be protected by State or Federal laws.  * You may refuse to sign this Authorization. Your refusal will not affect your ability to obtain treatment.  * This Authorization becomes effective upon signing and will  on (date) __________.      If no date is indicated, this Authorization will  one (1) year from the signature date.    Name: Rajat Cassa    Signature:   Date:       2/24/2017       PLEASE FAX REQUESTED RECORDS BACK TO: [phone]

## 2017-02-24 NOTE — ASSESSMENT & PLAN NOTE
Chronic in nature. Patient states this has been worsening over the last couple of weeks. Patient did have a bowel movement yesterday, but states that it was difficult. He states he does have puffiness told this may be related to constipation as patient does report having hemorrhoids, but recent labs showed anemia as such other causes cannot be ruled out and patient is referred to gastroenterology for further workup. Patient is encouraged to take Colace twice daily as well as his MiraLAX to his insulin increased number of bowel movements. Patient is going every other day to every 3 days at this point.

## 2017-02-24 NOTE — ASSESSMENT & PLAN NOTE
Last TSH was 0.990 completed in 2014. Patient denies side affects of medication including palpitations, dizziness, shortness of breath, diarrhea. Patient does report constipation but denies heat or cold intolerance changes in her skin and nails are weight gain.

## 2017-02-24 NOTE — PROGRESS NOTES
Chief Complaint   Patient presents with   • Establish Care     New Patient        HISTORY OF THE PRESENT ILLNESS: This is a 82 y.o. male new patient to our practice. This pleasant patient is here today to discuss multiple issues and establish care.    Anxiety  Chronic in Nature. Stable. Taking seroquel/cymbalta. Symptoms controlled. Denies side effects from medication. Denies constant worrying, irritation or agitation. Patient is no longer taking Xanax with regard to concern for falling. Patient refuses for referral to psychiatry for further management of anxiety without sedating medication. Will discuss this further at follow-up.    GERD (GASTROESOPHAGEAL REFLUX DISEASE)  Chronic in nature. Patient has been seen by gastroenterology in the past for this issue patient is referred to gastroenterology as he states he is having GI bleeding, denies abdominal pain, epigastric pain, is having some dizziness denies nausea or vomiting, denies belching.  States that anesthetic or specific foods make symptoms worse, Tums improves symptoms. Increasing over last few weeks, not taking medication. Pepcid 20 mg twice daily 30 minutes before meals prescribed at this time.    Chronic midline low back pain without sciatica  Chronic in nature. Related to fall. Patient states he was seeing ortho with regard to lumbar compression fracture, but did not want to continue following with orthopedics. Patient has been taking oxycodone 10 mg 6 times a day, increased dizziness and falling more frequently this may be related to polypharmacy. Patient's wife is also very adamant that he needs to continue taking this for both pain medication, despite counseling him he may reduce his dose or frequency of his dosing. Patient is referred to pain management.    Squamous cell cancer of tongue  Patient states he was following up with oncology, but a couple of years ago he canceled a follow-up appointment and has not been seen since. Patient denies  swelling in his throat, difficulty swallowing, or other concerning symptoms refuses referral to oncology at this time. Patient states he believes he was cancer-free as he had completed treatment.    Insomnia  Chronic in nature. Patient is using Seroquel at night as good results. States he is taking better pain awake during the day, and is having less fatigue.    Controlled type 2 diabetes mellitus without complication, without long-term current use of insulin (CMS-[facility])  Most recent hemoglobin A1c is 6. Patient is taking metformin 500 mg daily this that this is working well for her symptoms and denies side effects on this medication. Discussed with patient symptoms of high and low blood pressure. Patient states that he has not noticed these. Patient does not take his blood sugar and refuses to do so. Plan to obtain labs.    Drug-induced constipation  Chronic in nature. Patient states this has been worsening over the last couple of weeks. Patient did have a bowel movement yesterday, but states that it was difficult. He states he does have puffiness told this may be related to constipation as patient does report having hemorrhoids, but recent labs showed anemia as such other causes cannot be ruled out and patient is referred to gastroenterology for further workup. Patient is encouraged to take Colace twice daily as well as his MiraLAX to his insulin increased number of bowel movements. Patient is going every other day to every 3 days at this point.     Depression  Patient continues to have some symptoms of depression including lack of enjoyment indicated activities. Denies suicidal or homicidal ideation. Does make jokes during exam.    Hypothyroidism  Last TSH was 0.990 completed in 2014. Patient denies side affects of medication including palpitations, dizziness, shortness of breath, diarrhea. Patient does report constipation but denies heat or cold intolerance changes in her skin and nails are weight  gain.    Fall  Patient fell in the clinic while using the bathroom prior to leaving. States he had onset on the wall. Denies Headache, blurry vision, dizziness, shortness of breath, palpitations, pain, and has had her neck. Small bruise located base of right palm. PERRLA, neuro exam is within normal limits. Patient is instructed to call if his symptoms including headache, dizziness, weakness, pain, or increasing in the emergency room for these symptoms.      Past Medical History   Diagnosis Date   • Psychiatric disorder      depression. insomnia   • Disease related peripheral neuropathy (CMS-HCC)    • Diabetes    • Thyroid disease    • Arthritis    • Unspecified urinary incontinence    • Dental disorder    • Urinary bladder disorder    • Bronchitis    • Coughing blood    • Sleep apnea    • Pain    • Other specified disorder of intestines      occassional constipation   • CATARACT      IOL right eye   • Constipation 8/28/2013       Past Surgical History   Procedure Laterality Date   • Cholecystectomy     • Laryngoscopy  5/18/2012     Performed by AISHA MCMILLAN at [facility]   • Esophagoscopy  5/18/2012     Performed by AISHA MCMILLAN at [facility]       No family status information on file.     Family History   Problem Relation Age of Onset   • Heart Disease Father    • Diabetes Neg Hx    • Stroke Neg Hx    • Cancer Neg Hx      stomach       Social History   Substance Use Topics   • Smoking status: Former Smoker -- 1.00 packs/day for 10 years     Types: Cigarettes     Quit date: 01/01/1965   • Smokeless tobacco: Never Used   • Alcohol Use: No       Allergies: Nkda    Current Outpatient Prescriptions Ordered in [facility]   Medication Sig Dispense Refill   • metformin (GLUCOPHAGE) 500 MG Tab Take 1 Tab by mouth every day. 90 Tab 3   • psyllium (METAMUCIL) 58.12 % Pack Take 1 Packet by mouth every day.     • famotidine (PEPCID) 20 MG Tab Take 1 Tab by mouth 2 times a day. 60 Tab 3   •  quetiapine (SEROQUEL) 300 MG tablet Take 1 Tab by mouth every evening. 90 Tab 3   • docusate sodium (COLACE) 100 MG Cap Take 1 Cap by mouth 2 times a day. 180 Cap 3   • oxycodone immediate release (ROXICODONE) 10 MG immediate release tablet Take 1 Tab by mouth every four hours as needed for Severe Pain. 112 Tab 0   • duloxetine (CYMBALTA) 60 MG Cap DR Particles delayed-release capsule Take 1 Cap by mouth every day. 30 Cap 3   • levothyroxine (SYNTHROID) 50 MCG Tab Take 1 Tab by mouth Every morning on an empty stomach. 90 Tab 3   • ferrous sulfate 325 (65 FE) MG tablet Take 1 Tab by mouth every day. With 250 mg vit C and a stool softner such as colace 30 Tab 3     No current Epic-ordered facility-administered medications on file.       Review of Systems   Constitutional:  Negative for fever, chills, weight loss and malaise/fatigue.   HENT:  Negative for ear pain, nosebleeds, congestion, sore throat and neck pain.    Eyes:  Negative for blurred vision.   Respiratory:  Negative for cough, sputum production, shortness of breath and wheezing.    Cardiovascular:  Negative for chest pain, palpitations, orthopnea and leg swelling.   Gastrointestinal:  Negative for heartburn, nausea, vomiting and abdominal pain.   Genitourinary:  Negative for dysuria, urgency and frequency. Positive constipation, blood in stool.  Musculoskeletal:  Negative for myalgias, back pain and joint pain.   Skin:  Negative for rash and itching.   Neurological:  Negative for dizziness, tingling, tremors, sensory change, focal weakness and headaches.   Endo/Heme/Allergies:  Does not bruise/bleed easily.   Psychiatric/Behavioral:  Negative for depression, anxiety, or memory loss.     All other systems reviewed and are negative except as in HPI.    Exam: Blood pressure 122/70, pulse 90, temperature 36.4 °C (97.6 °F), resp. rate 16, height 1.829 m (6'), weight 60.782 kg (134 lb), SpO2 96 %.  General:  Normal appearing. No distress.  HEENT:  Normocephalic.  Eyes conjunctiva clear lids without ptosis, pupils equal and reactive to light accommodation, ears normal shape and contour, canals are clear bilaterally, tympanic membranes are benign, nasal mucosa benign, oropharynx is without erythema, edema or exudates.   Neck:  Supple without JVD or bruit. Thyroid is not enlarged.  Pulmonary:  Clear to ausculation.  Normal effort. No rales, ronchi, or wheezing.  Cardiovascular:  Regular rate and rhythm without murmur. Carotid and radial pulses are intact and equal bilaterally.  Abdomen:  Soft, nontender, nondistended. Normal bowel sounds. Liver and spleen are not palpable  Neurologic:  Grossly nonfocal. Cranial nerves II through XII intact. Bilateral upper and lower extremity strength 5 over 5, no facial droop or weakness.  Lymph:  No cervical, supraclavicular or axillary lymph nodes are palpable  Skin:  Warm and dry.  No obvious lesions.  Musculoskeletal: Impaired gait, uses walker. No extremity cyanosis, clubbing, or edema.  Psych:  Normal mood and affect. Alert and oriented x3. Judgment and insight is normal.    Medical decision making:  Santosh is an ill-appearing 82-year-old male patient here today to establish care and discuss multiple issues. This provider has concerns regarding overmedication of this patient. Patient states that he experiences dizziness on and off and has had several falls. Patient did fall in the bathroom in the clinic today, assessment as above. Patient is currently taking Seroquel as well as using Roxicodone, and milligrams 6 times a day these medications are likely contributing to his dizziness . Patient is referred to pain management as it is imperative that we find other options manage his back pain, discussed possibility of discontinuing Seroquel patient is amenable to this at this time. Plan will be to discontinue or wean down use of Roxicodone to improve symptoms of dizziness. Patient offered referral to physical therapy for weakness, is offered  referral to neurosurgery or encouraged to follow-up with orthopedics with regard to back pain patient refuses at this time. Discussed use of boost or other supplements, patient's wife states that he has to supplement couple times a day. Patient's blood pressure is 122/70, orthostatics are negative at this time. Patient denies palpitations, chest pain, dizziness, shortness of breath or other concerning symptoms. Patient is taking iron with recurrent anemia patient refuses to the labs in June, labs at that time. Discussed with patient the importance of being seen by gastroenterology. Patient is encouraged to be seen in the emergency room for chest pain, palpitations, shortness of breath, dizziness, severe abdominal pain or other concerning symptoms.    Please note that this dictation was created using voice recognition software. I have made every reasonable attempt to correct obvious errors, but I expect that there are errors of grammar and possibly content that I did not discover before finalizing the note.      Assessment/Plan  1. Anxiety  quetiapine (SEROQUEL) 300 MG tablet   2. Gastroesophageal reflux disease, esophagitis presence not specified  famotidine (PEPCID) 20 MG Tab    REFERRAL TO GASTROENTEROLOGY   3. Chronic midline low back pain without sciatica  REFERRAL TO PAIN MANAGEMENT    docusate sodium (COLACE) 100 MG Cap   4. Other specified hypothyroidism  TSH   5. Essential hypertension  LIPID PROFILE    MICROALBUMIN CREAT RATIO URINE    CBC WITH DIFFERENTIAL    COMP METABOLIC PANEL   6. Controlled type 2 diabetes mellitus without complication, without long-term current use of insulin (CMS-HCC)  LIPID PROFILE    MICROALBUMIN CREAT RATIO URINE    CBC WITH DIFFERENTIAL    COMP METABOLIC PANEL   7. Fall, initial encounter     8. Squamous cell cancer of tongue (CMS-HCC)     9. Other insomnia     10. Drug-induced constipation     11. Other depression           I have placed the below orders and discussed them with  an approved delegating provider. The MA is performing the below orders under the direction of Dr. Bynum.

## 2017-03-17 ENCOUNTER — OFFICE VISIT (OUTPATIENT)
Dept: MEDICAL GROUP | Facility: PHYSICIAN GROUP | Age: 82
End: 2017-03-17
Payer: MEDICARE

## 2017-03-17 VITALS
WEIGHT: 133 LBS | HEART RATE: 97 BPM | BODY MASS INDEX: 18.01 KG/M2 | RESPIRATION RATE: 16 BRPM | OXYGEN SATURATION: 97 % | DIASTOLIC BLOOD PRESSURE: 74 MMHG | TEMPERATURE: 97.7 F | SYSTOLIC BLOOD PRESSURE: 138 MMHG | HEIGHT: 72 IN

## 2017-03-17 DIAGNOSIS — F32.89 OTHER DEPRESSION: ICD-10-CM

## 2017-03-17 DIAGNOSIS — M25.552 PAIN IN LEFT HIP: ICD-10-CM

## 2017-03-17 DIAGNOSIS — G89.29 CHRONIC MIDLINE LOW BACK PAIN WITHOUT SCIATICA: ICD-10-CM

## 2017-03-17 DIAGNOSIS — E11.9 CONTROLLED TYPE 2 DIABETES MELLITUS WITHOUT COMPLICATION, WITHOUT LONG-TERM CURRENT USE OF INSULIN (HCC): ICD-10-CM

## 2017-03-17 DIAGNOSIS — F41.9 ANXIETY: ICD-10-CM

## 2017-03-17 DIAGNOSIS — M54.50 CHRONIC MIDLINE LOW BACK PAIN WITHOUT SCIATICA: ICD-10-CM

## 2017-03-17 PROCEDURE — 3288F FALL RISK ASSESSMENT DOCD: CPT | Performed by: NURSE PRACTITIONER

## 2017-03-17 PROCEDURE — 0518F FALL PLAN OF CARE DOCD: CPT | Mod: 8P | Performed by: NURSE PRACTITIONER

## 2017-03-17 PROCEDURE — G8419 CALC BMI OUT NRM PARAM NOF/U: HCPCS | Performed by: NURSE PRACTITIONER

## 2017-03-17 PROCEDURE — 99214 OFFICE O/P EST MOD 30 MIN: CPT | Performed by: NURSE PRACTITIONER

## 2017-03-17 PROCEDURE — 4040F PNEUMOC VAC/ADMIN/RCVD: CPT | Performed by: NURSE PRACTITIONER

## 2017-03-17 PROCEDURE — G8432 DEP SCR NOT DOC, RNG: HCPCS | Performed by: NURSE PRACTITIONER

## 2017-03-17 PROCEDURE — G8484 FLU IMMUNIZE NO ADMIN: HCPCS | Performed by: NURSE PRACTITIONER

## 2017-03-17 PROCEDURE — 1036F TOBACCO NON-USER: CPT | Performed by: NURSE PRACTITIONER

## 2017-03-17 PROCEDURE — 1100F PTFALLS ASSESS-DOCD GE2>/YR: CPT | Performed by: NURSE PRACTITIONER

## 2017-03-17 RX ORDER — OXYCODONE HYDROCHLORIDE 10 MG/1
10 TABLET ORAL EVERY 4 HOURS PRN
Qty: 90 TAB | Refills: 0 | Status: SHIPPED | OUTPATIENT
Start: 2017-03-17 | End: 2017-03-17 | Stop reason: SDUPTHER

## 2017-03-17 RX ORDER — OXYCODONE HYDROCHLORIDE 10 MG/1
10 TABLET ORAL EVERY 4 HOURS PRN
Qty: 60 TAB | Refills: 0 | Status: SHIPPED | OUTPATIENT
Start: 2017-03-17 | End: 2017-07-07 | Stop reason: SDUPTHER

## 2017-03-17 RX ORDER — QUETIAPINE FUMARATE 200 MG/1
200 TABLET, FILM COATED ORAL EVERY EVENING
Qty: 90 TAB | Refills: 3 | Status: ON HOLD | OUTPATIENT
Start: 2017-03-17 | End: 2017-05-22

## 2017-03-17 RX ORDER — DULOXETIN HYDROCHLORIDE 60 MG/1
60 CAPSULE, DELAYED RELEASE ORAL DAILY
Qty: 90 CAP | Refills: 3 | Status: SHIPPED | OUTPATIENT
Start: 2017-03-17 | End: 2018-03-23 | Stop reason: SDUPTHER

## 2017-03-17 RX ORDER — OXYCODONE HYDROCHLORIDE 10 MG/1
10 TABLET ORAL EVERY 4 HOURS PRN
Qty: 60 TAB | Refills: 0 | Status: SHIPPED | OUTPATIENT
Start: 2017-03-17 | End: 2017-03-17 | Stop reason: SDUPTHER

## 2017-03-17 ASSESSMENT — PAIN SCALES - GENERAL: PAINLEVEL: NO PAIN

## 2017-03-17 NOTE — ASSESSMENT & PLAN NOTE
"Chronic in nature. Stable. Patient is taking Seroquel and Cymbalta for symptoms which are controlled at this time. Patient continues to have some dizziness \"on and off\". Denies constant worry, irritation or agitation. Patient is no longer taking Xanax with regard to concern for falling. Patient refuses referral to psychiatry. Reducing Seroquel dose to 200 mg daily to see if this continues to control symptoms and decreases side effects.  "

## 2017-03-17 NOTE — PROGRESS NOTES
"Chief Complaint   Patient presents with   • Back Pain       HISTORY OF PRESENT ILLNESS: Patient is a 82 y.o. male established patient who presents today to discuss pain medication.    Anxiety  Chronic in nature. Stable. Patient is taking Seroquel and Cymbalta for symptoms which are controlled at this time. Patient continues to have some dizziness \"on and off\". Denies constant worry, irritation or agitation. Patient is no longer taking Xanax with regard to concern for falling. Patient refuses referral to psychiatry. Reducing Seroquel dose to 200 mg daily to see if this continues to control symptoms and decreases side effects.    Controlled type 2 diabetes mellitus without complication, without long-term current use of insulin (CMS-[facility])  Chronic in nature. Stable. Patient continues to take metformin 500 mg daily and this is working well for symptoms. Patient denies side effects on the medication. Discussed with patient symptoms of high and low blood sugar. Patient has not noticed any issues with this. Patient refuses to check his blood sugar at home. We'll repeat labs in June.    Chronic midline low back pain without sciatica  Chronic in nature. Stable. Patient refuses to continue follow-up with orthopedics. Patient is currently taking oxycodone 10 mg 2-3 times a day which is decreased from previous dose. Plan to continue to decrease this related to increased symptoms of dizziness on and off. Patient does have an appointment with pain management in July. Discussed possibility of referral to [facility] related to multiple issues patient adamantly refuses at this time. Patient refuses to provide urine for drug testing at this visit.       Patient Active Problem List    Diagnosis Date Noted   • Hypertension 11/22/2011     Priority: Low   • Hypothyroidism 09/01/2009     Priority: Low   • Depression 06/15/2009     Priority: Low   • Chronic midline low back pain without sciatica 02/24/2017   • Fall " 02/24/2017   • Drug-induced constipation 02/24/2017   • Protein calorie malnutrition (CMS-HCC) 12/01/2016   • Controlled type 2 diabetes mellitus without complication, without long-term current use of insulin (CMS-HCC) 12/01/2016   • Gait disorder 05/11/2016   • Insomnia 04/06/2016   • Squamous cell cancer of tongue (CMS-HCC) 11/08/2012   • GERD (gastroesophageal reflux disease) 11/08/2010   • Anxiety 01/25/2010   • GOUT 06/15/2009       Allergies:Nkda    Current Outpatient Prescriptions   Medication Sig Dispense Refill   • duloxetine (CYMBALTA) 60 MG Cap DR Particles delayed-release capsule Take 1 Cap by mouth every day. 90 Cap 3   • quetiapine (SEROQUEL) 200 MG Tab Take 1 Tab by mouth every evening. 90 Tab 3   • oxycodone immediate release (ROXICODONE) 10 MG immediate release tablet Take 1 Tab by mouth every four hours as needed for Severe Pain. 60 Tab 0   • metformin (GLUCOPHAGE) 500 MG Tab Take 1 Tab by mouth every day. 90 Tab 3   • psyllium (METAMUCIL) 58.12 % Pack Take 1 Packet by mouth every day.     • famotidine (PEPCID) 20 MG Tab Take 1 Tab by mouth 2 times a day. 60 Tab 3   • docusate sodium (COLACE) 100 MG Cap Take 1 Cap by mouth 2 times a day. 180 Cap 3   • ferrous sulfate 325 (65 FE) MG tablet Take 1 Tab by mouth every day. With 250 mg vit C and a stool softner such as colace 30 Tab 3   • levothyroxine (SYNTHROID) 50 MCG Tab Take 1 Tab by mouth Every morning on an empty stomach. 90 Tab 3     No current facility-administered medications for this visit.       Social History   Substance Use Topics   • Smoking status: Former Smoker -- 1.00 packs/day for 10 years     Types: Cigarettes     Quit date: 01/01/1965   • Smokeless tobacco: Never Used   • Alcohol Use: No       No family status information on file.     Family History   Problem Relation Age of Onset   • Heart Disease Father    • Diabetes Neg Hx    • Stroke Neg Hx    • Cancer Neg Hx      stomach       Review of Systems:   Constitutional:  Negative for  fever, chills, weight loss and malaise/fatigue.   HEENT:  Negative for ear pain, nosebleeds, congestion, sore throat and neck pain.    Eyes:  Negative for blurred vision.   Respiratory:  Negative for cough, sputum production, shortness of breath and wheezing.    Cardiovascular:  Negative for chest pain, palpitations, orthopnea and leg swelling.   Gastrointestinal:  Negative for heartburn, nausea, vomiting and abdominal pain.   Genitourinary:  Negative for dysuria, urgency and frequency.   Musculoskeletal: Postive for myalgias, back pain and joint pain.   Skin:  Negative for rash and itching.   Neurological:  Negative for dizziness, tingling, tremors, sensory change, focal weakness and headaches.   Endo/Heme/Allergies:  Does not bruise/bleed easily.   Psychiatric/Behavioral:  Negative for depression, suicidal ideas and memory loss.  The patient is not nervous/anxious and does not have insomnia.    All other systems reviewed and are negative except as in HPI.    Exam:  Blood pressure 138/74, pulse 97, temperature 36.5 °C (97.7 °F), resp. rate 16, height 1.829 m (6'), weight 60.328 kg (133 lb), SpO2 97 %.  General:  Normal appearing. No distress.  HEENT:  Normocephalic. Eyes conjunctiva clear lids without ptosis, pupils equal and reactive to light accommodation, ears normal shape and contour, canals are clear bilaterally, tympanic membranes are benign, nasal mucosa benign, oropharynx is without erythema, edema or exudates.   Pulmonary:  Clear to ausculation.  Normal effort. No rales, ronchi, or wheezing.  Cardiovascular:  Regular rate and rhythm without murmur. Carotid and radial pulses are intact and equal bilaterally.  Abdomen:  Soft, nontender, nondistended. Normal bowel sounds. Liver and spleen are not palpable  Neurologic:  Grossly nonfocal  Skin:  Warm and dry.  No obvious lesions.  Musculoskeletal:  Impaired gait, uses a walker. No extremity cyanosis, clubbing, or edema. Tenderness to palpation L5-S1.   Psych:   Normal mood and affect. Alert and oriented x3. Judgment and insight is normal.      Medical decision-making and discussion: Santosh is an ill-appearing 82-year-old male patient here today to discuss pain. This provider continues to have concerns regarding overmedication of this patient. Patient states that he experiences dizziness on and off and has had several falls. States that dizziness has improved with decrease dose of pain medication. Patient is currently taking Seroquel as well as using Roxicodone. Roxicodone has been decreased to 10 mg 3 times a day which is half of his taking previously. Seroquel is decreased to 200 mg daily. Patient is scheduled to see pain management in July. Patient refuses referral to [facility]. Plan is to go down to 2 10 mg Roxicodone daily in April, and try to decrease this further in June. Patient will have repeat labs in June. Patient continues to refuse referrals to physical therapy, neurosurgery, orthopedics. Patient has started using boost supplements. Patient denies palpitations, chest pain, dizziness, shortness of breath or other concerning symptoms. Patient is taking iron with recurrent anemia. Discussed with patient the importance of being seen by gastroenterology, patient continues to refuse. Patient is encouraged to be seen in the emergency room for chest pain, palpitations, shortness of breath, dizziness, severe abdominal pain or other concerning symptoms.    Please note that this dictation was created using voice recognition software. I have made every reasonable attempt to correct obvious errors, but I expect that there are errors of grammar and possibly content that I did not discover before finalizing the note.    Assessment/Plan:  1. Pain in left hip  oxycodone immediate release (ROXICODONE) 10 MG immediate release tablet    DISCONTINUED: oxycodone immediate release (ROXICODONE) 10 MG immediate release tablet    DISCONTINUED: oxycodone immediate release  (ROXICODONE) 10 MG immediate release tablet   2. Other depression  duloxetine (CYMBALTA) 60 MG Cap DR Particles delayed-release capsule   3. Anxiety  quetiapine (SEROQUEL) 200 MG Tab   4. Controlled type 2 diabetes mellitus without complication, without long-term current use of insulin (CMS-[facility])     5. Chronic midline low back pain without sciatica            I have placed the below orders and discussed them with an approved delegating provider. The MA is performing the below orders under the direction of Dr. Bynum.

## 2017-03-17 NOTE — ASSESSMENT & PLAN NOTE
Chronic in nature. Stable. Patient refuses to continue follow-up with orthopedics. Patient is currently taking oxycodone 10 mg 2-3 times a day which is decreased from previous dose. Plan to continue to decrease this related to increased symptoms of dizziness on and off. Patient does have an appointment with pain management in July. Discussed possibility of referral to [facility] related to multiple issues patient adamantly refuses at this time. Patient refuses to provide urine for drug testing at this visit.

## 2017-03-17 NOTE — ASSESSMENT & PLAN NOTE
Chronic in nature. Stable. Patient continues to take metformin 500 mg daily and this is working well for symptoms. Patient denies side effects on the medication. Discussed with patient symptoms of high and low blood sugar. Patient has not noticed any issues with this. Patient refuses to check his blood sugar at home. We'll repeat labs in June.

## 2017-03-17 NOTE — MR AVS SNAPSHOT
Santosh Casas   3/17/2017 9:40 AM   Office Visit   MRN: [id number]    Department:  [facility]   Dept Phone:  [phone]    Description:  Male : 1934   Provider:  GARCIA Valentine           Reason for Visit     Back Pain           Allergies as of 3/17/2017     Allergen Noted Reactions    Nkda [No Known Drug Allergy] 2010         You were diagnosed with     Pain in left hip   [718221]       Other depression   [8846905]       Anxiety   [724851]         Vital Signs     Blood Pressure Pulse Temperature Respirations Height Weight    138/74 mmHg 97 36.5 °C (97.7 °F) 16 1.829 m (6') 60.328 kg (133 lb)    Body Mass Index Oxygen Saturation Smoking Status             18.03 kg/m2 97% Former Smoker         Basic Information     Date Of Birth Sex Race Ethnicity Preferred Language    1934 Male White Non- English      Your appointments     2017  9:40 AM   Established Patient with GARCIA Valentine   [facility] (--)    [address]  [address]  [place] [address] [phone]           You will be receiving a confirmation call a few days before your appointment from our automated call confirmation system.            2017 10:15 AM   New Patient with Giovani Sanders M.D.   [facility] (--)    [address]  [place] [address] [phone]           Please bring Photo ID, Insurance Cards, All Medication Bottles and copies of any legal documents (such as Living Will, Power of ) If speaking a language besides English please bring an adult . Please arrive 30 minutes prior for check in and registration. You will be receiving a confirmation call a few days before your appointment from our automated call confirmation system.              Problem List              ICD-10-CM Priority Class Noted - Resolved    GOUT    6/15/2009 - Present    Depression F32.9 Low  6/15/2009 -  Present    Hypothyroidism E03.9 Low  9/1/2009 - Present    Anxiety F41.9   1/25/2010 - Present    GERD (gastroesophageal reflux disease) K21.9   11/8/2010 - Present    Hypertension I10 Low  11/22/2011 - Present    Squamous cell cancer of tongue (CMS-HCC) C02.9   11/8/2012 - Present    Insomnia G47.00   4/6/2016 - Present    Gait disorder R26.9   5/11/2016 - Present    Protein calorie malnutrition (CMS-HCC) E46   12/1/2016 - Present    Controlled type 2 diabetes mellitus without complication, without long-term current use of insulin (CMS-HCC) E11.9   12/1/2016 - Present    Chronic midline low back pain without sciatica M54.5, G89.29   2/24/2017 - Present    Fall W19.XXXA   2/24/2017 - Present    Drug-induced constipation K59.03   2/24/2017 - Present      Health Maintenance        Date Due Completion Dates    IMM DTaP/Tdap/Td Vaccine (1 - Tdap) 4/27/1953 ---    IMM ZOSTER VACCINE 4/27/1994 ---    IMM PNEUMOCOCCAL 65+ (ADULT) LOW/MEDIUM RISK SERIES (2 of 2 - PCV13) 11/23/2012 11/23/2011    RETINAL SCREENING 3/20/2015 3/20/2014, 3/20/2014    FASTING LIPID PROFILE 6/8/2016 6/8/2015, 6/23/2014, 4/12/2011, 11/9/2009, 1/12/2006    URINE ACR / MICROALBUMIN 6/8/2016 6/8/2015, 6/23/2014, 12/12/2012, 7/17/2009    IMM INFLUENZA (1) 2/1/2018 (Originally 9/1/2016) 11/23/2011    A1C SCREENING 7/14/2017 1/14/2017, 8/30/2016, 1/6/2016, 6/8/2015, 6/23/2014, 12/12/2012, 11/23/2011, 4/12/2011, 2/24/2010, 11/9/2009, 7/17/2009, 1/12/2006, 1/11/2006    SERUM CREATININE 1/14/2018 1/14/2017, 11/8/2016, 6/8/2015, 8/22/2013, 8/21/2013, 7/12/2013, 12/12/2012, 12/11/2012, 5/15/2012, 5/15/2012, 11/28/2011, 11/23/2011, 11/21/2011, 2/24/2010, 12/27/2009, 1/12/2006, 1/11/2006, 1/10/2006    COLONOSCOPY 6/16/2024 6/16/2014 (N/S)    Override on 6/16/2014: (N/S)            Current Immunizations     Influenza TIV (IM) 11/23/2011 11:35 AM    Pneumococcal polysaccharide vaccine (PPSV-23) 11/23/2011 11:45 AM      Below and/or attached are the medications  your provider expects you to take. Review all of your home medications and newly ordered medications with your provider and/or pharmacist. Follow medication instructions as directed by your provider and/or pharmacist. Please keep your medication list with you and share with your provider. Update the information when medications are discontinued, doses are changed, or new medications (including over-the-counter products) are added; and carry medication information at all times in the event of emergency situations     Allergies:  NKDA - (reactions not documented)               Medications  Valid as of: March 17, 2017 - 10:01 AM    Generic Name Brand Name Tablet Size Instructions for use    Docusate Sodium (Cap) COLACE 100 MG Take 1 Cap by mouth 2 times a day.        DULoxetine HCl (Cap DR Particles) CYMBALTA 60 MG Take 1 Cap by mouth every day.        Famotidine (Tab) PEPCID 20 MG Take 1 Tab by mouth 2 times a day.        Ferrous Sulfate (Tab) ferrous sulfate 325 (65 FE) MG Take 1 Tab by mouth every day. With 250 mg vit C and a stool softner such as colace        Levothyroxine Sodium (Tab) SYNTHROID 50 MCG Take 1 Tab by mouth Every morning on an empty stomach.        MetFORMIN HCl (Tab) GLUCOPHAGE 500 MG Take 1 Tab by mouth every day.        OxyCODONE HCl (Tab) ROXICODONE 10 MG Take 1 Tab by mouth every four hours as needed for Severe Pain.        Psyllium (Pack) METAMUCIL 58.12 % Take 1 Packet by mouth every day.        QUEtiapine Fumarate (Tab) SEROQUEL 200 MG Take 1 Tab by mouth every evening.        .                 Medicines prescribed today were sent to:     [id number] - [place], [place] - [address]    [address] [place] [address]    Phone: [phone] Fax: [phone]    Open 24 Hours?: No      Medication refill instructions:       If your prescription bottle indicates you have medication refills left, it is not necessary to call your provider’s office. Please contact your pharmacy and  they will refill your medication.    If your prescription bottle indicates you do not have any refills left, you may request refills at any time through one of the following ways: The online [employer] system (except Urgent Care), by calling your provider’s office, or by asking your pharmacy to contact your provider’s office with a refill request. Medication refills are processed only during regular business hours and may not be available until the next business day. Your provider may request additional information or to have a follow-up visit with you prior to refilling your medication.   *Please Note: Medication refills are assigned a new Rx number when refilled electronically. Your pharmacy may indicate that no refills were authorized even though a new prescription for the same medication is available at the pharmacy. Please request the medicine by name with the pharmacy before contacting your provider for a refill.        Other Notes About Your Plan     Please Assess and Status Chronic Disease from Current and Prior Visits.     Query: Please Assess the following Diagnosis    1. Suggested code 707.14 Ulcer of heel and midfoot. Diagnosis 11/12/2014 per Dr German Bush see Epic note entry of 11/230/2014 dated 11/12/2014. Please status and document if ulcer remains/treatment.     2.Suggested Code 296.30 Major depressive disorder, recurrent episode, unspecified degree. Please state if depression mild, moderate or severe. Long term history of Depression patient taking Xanax from 03/12/2009 with last refill 07/09/2015. Remeron 01/16/2009 with last Refill 06/10/2015 and Seroquel 01/05/2011 with last refill 06/12/2015. Please status diagnosis for these medicaitons.     3. 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

## 2017-03-30 ENCOUNTER — PATIENT OUTREACH (OUTPATIENT)
Dept: HEALTH INFORMATION MANAGEMENT | Facility: OTHER | Age: 82
End: 2017-03-30

## 2017-04-06 NOTE — PROGRESS NOTES
Attempt #:2    Verify PCP: yes    Communication Preference Obtained: yes     Review Care Team: yes    Annual Wellness Visit Scheduling  1. Scheduling Status:Not Scheduled. Patient states they are not interested          Care Gap Scheduling (Attempt to Schedule EACH Overdue Care Gap!)     Health Maintenance Due   Topic Date Due   • Annual Wellness Visit  04/27/1934   • IMM DTaP/Tdap/Td Vaccine (1 - Tdap) 04/27/1953   • IMM ZOSTER VACCINE  04/27/1994   • IMM PNEUMOCOCCAL 65+ (ADULT) LOW/MEDIUM RISK SERIES (2 of 2 - PCV13) 11/23/2012   • RETINAL SCREENING  03/20/2015   • FASTING LIPID PROFILE  06/08/2016   • URINE ACR / MICROALBUMIN  06/08/2016         [employer] Activation: declined  [employer] Kamari: no  Virtual Visits: no  Opt In to Text Messages: no

## 2017-05-08 DIAGNOSIS — M25.552 PAIN IN LEFT HIP: ICD-10-CM

## 2017-05-08 RX ORDER — OXYCODONE HYDROCHLORIDE 10 MG/1
10 TABLET ORAL EVERY 4 HOURS PRN
Qty: 60 TAB | Refills: 0
Start: 2017-05-08

## 2017-05-08 NOTE — TELEPHONE ENCOUNTER
Was the patient seen in the last year in this department? Yes     Does patient have an active prescription for medications requested? No     Received Request Via: Patient WIFE   Patients wife called Ailyn Casas requesting a refill on his Oxycodone patient has a refill for 05/17/2017. Joanie said patient only has 6 pills left and he wont have enough to last time. Ailyn said he gives him one in the am and one in the pm ans sometimes one at 2AM. Let patient know she's early on refilling but i would send Jesus a message. LM

## 2017-05-08 NOTE — TELEPHONE ENCOUNTER
Called and spoke with Ailyn. Made and appointment for a follow up this week to see Tommie DOMÍNGUEZ

## 2017-05-10 ENCOUNTER — TELEPHONE (OUTPATIENT)
Dept: MEDICAL GROUP | Facility: PHYSICIAN GROUP | Age: 82
End: 2017-05-10

## 2017-05-10 NOTE — TELEPHONE ENCOUNTER
ESTABLISHED PATIENT PRE-VISIT PLANNING     Note: Patient will not be contacted if there is no indication to call.     1.  Reviewed note from last office visit with PCP and/or other med group provider: Yes    2.  If any orders were placed at last visit, do we have Results/Consult Notes?        •  Labs - Labs were not ordered at last office visit.       •  Imaging - Imaging was not ordered at last office visit.       •  Referrals - No referrals were ordered at last office visit.    3.  Immunizations were updated in [facility] using WebIZ?: Yes       •  Web [employer] Recommendations: FLU HEPATITIS A  HEPATITIS B PREVNAR (PCV13)  TDAP VARICELLA (Chicken Pox)  ZOSTAVAX (Shingles)    4.  Patient is due for the following Health Maintenance Topics:   Health Maintenance Due   Topic Date Due   • Annual Wellness Visit  04/27/1934   • IMM DTaP/Tdap/Td Vaccine (1 - Tdap) 04/27/1953   • IMM ZOSTER VACCINE  04/27/1994   • IMM PNEUMOCOCCAL 65+ (ADULT) LOW/MEDIUM RISK SERIES (2 of 2 - PCV13) 11/23/2012   • RETINAL SCREENING  03/20/2015   • FASTING LIPID PROFILE  06/08/2016   • URINE ACR / MICROALBUMIN  06/08/2016       - Patient has completed FLU and PNEUMOVAX (PPSV23) Immunization(s) per WebIZ. Chart has been updated.    5.  Patient was not informed to arrive 15 min prior to their scheduled appointment and bring in their medication bottles.

## 2017-05-11 ENCOUNTER — APPOINTMENT (OUTPATIENT)
Dept: RADIOLOGY | Facility: MEDICAL CENTER | Age: 82
DRG: 481 | End: 2017-05-11
Attending: EMERGENCY MEDICINE
Payer: MEDICARE

## 2017-05-11 ENCOUNTER — HOSPITAL ENCOUNTER (INPATIENT)
Facility: MEDICAL CENTER | Age: 82
LOS: 11 days | DRG: 481 | End: 2017-05-22
Attending: EMERGENCY MEDICINE | Admitting: INTERNAL MEDICINE
Payer: MEDICARE

## 2017-05-11 ENCOUNTER — RESOLUTE PROFESSIONAL BILLING HOSPITAL PROF FEE (OUTPATIENT)
Dept: HOSPITALIST | Facility: MEDICAL CENTER | Age: 82
End: 2017-05-11
Payer: MEDICARE

## 2017-05-11 ENCOUNTER — APPOINTMENT (OUTPATIENT)
Dept: RADIOLOGY | Facility: MEDICAL CENTER | Age: 82
DRG: 481 | End: 2017-05-11
Attending: ORTHOPAEDIC SURGERY
Payer: MEDICARE

## 2017-05-11 ENCOUNTER — APPOINTMENT (OUTPATIENT)
Dept: MEDICAL GROUP | Facility: PHYSICIAN GROUP | Age: 82
End: 2017-05-11
Payer: MEDICARE

## 2017-05-11 DIAGNOSIS — S72.002A HIP FRACTURE, LEFT, CLOSED, INITIAL ENCOUNTER (HCC): ICD-10-CM

## 2017-05-11 PROBLEM — S72.92XA FEMUR FRACTURE, LEFT (HCC): Status: ACTIVE | Noted: 2017-05-11

## 2017-05-11 LAB
ANION GAP SERPL CALC-SCNC: 11 MMOL/L (ref 0–11.9)
APPEARANCE UR: CLEAR
APTT PPP: 25.9 SEC (ref 24.7–36)
BASOPHILS # BLD AUTO: 0.3 % (ref 0–1.8)
BASOPHILS # BLD: 0.02 K/UL (ref 0–0.12)
BILIRUB UR QL STRIP.AUTO: NEGATIVE
BNP SERPL-MCNC: 42 PG/ML (ref 0–100)
BUN SERPL-MCNC: 14 MG/DL (ref 8–22)
CALCIUM SERPL-MCNC: 9 MG/DL (ref 8.5–10.5)
CHLORIDE SERPL-SCNC: 95 MMOL/L (ref 96–112)
CO2 SERPL-SCNC: 23 MMOL/L (ref 20–33)
COLOR UR: YELLOW
CREAT SERPL-MCNC: 1.14 MG/DL (ref 0.5–1.4)
CULTURE IF INDICATED INDCX: NO UA CULTURE
EOSINOPHIL # BLD AUTO: 0 K/UL (ref 0–0.51)
EOSINOPHIL NFR BLD: 0 % (ref 0–6.9)
ERYTHROCYTE [DISTWIDTH] IN BLOOD BY AUTOMATED COUNT: 43.4 FL (ref 35.9–50)
GFR SERPL CREATININE-BSD FRML MDRD: >60 ML/MIN/1.73 M 2
GLUCOSE BLD-MCNC: 153 MG/DL (ref 65–99)
GLUCOSE BLD-MCNC: 203 MG/DL (ref 65–99)
GLUCOSE SERPL-MCNC: 202 MG/DL (ref 65–99)
GLUCOSE UR STRIP.AUTO-MCNC: NEGATIVE MG/DL
HCT VFR BLD AUTO: 29.3 % (ref 42–52)
HGB BLD-MCNC: 9.6 G/DL (ref 14–18)
IMM GRANULOCYTES # BLD AUTO: 0.03 K/UL (ref 0–0.11)
IMM GRANULOCYTES NFR BLD AUTO: 0.5 % (ref 0–0.9)
INR PPP: 0.93 (ref 0.87–1.13)
KETONES UR STRIP.AUTO-MCNC: NEGATIVE MG/DL
LEUKOCYTE ESTERASE UR QL STRIP.AUTO: NEGATIVE
LYMPHOCYTES # BLD AUTO: 0.28 K/UL (ref 1–4.8)
LYMPHOCYTES NFR BLD: 4.7 % (ref 22–41)
MCH RBC QN AUTO: 30.4 PG (ref 27–33)
MCHC RBC AUTO-ENTMCNC: 32.8 G/DL (ref 33.7–35.3)
MCV RBC AUTO: 92.7 FL (ref 81.4–97.8)
MICRO URNS: NORMAL
MONOCYTES # BLD AUTO: 0.65 K/UL (ref 0–0.85)
MONOCYTES NFR BLD AUTO: 11 % (ref 0–13.4)
NEUTROPHILS # BLD AUTO: 4.92 K/UL (ref 1.82–7.42)
NEUTROPHILS NFR BLD: 83.5 % (ref 44–72)
NITRITE UR QL STRIP.AUTO: NEGATIVE
NRBC # BLD AUTO: 0 K/UL
NRBC BLD AUTO-RTO: 0 /100 WBC
PH UR STRIP.AUTO: 6 [PH]
PLATELET # BLD AUTO: 236 K/UL (ref 164–446)
PMV BLD AUTO: 8.6 FL (ref 9–12.9)
POTASSIUM SERPL-SCNC: 4 MMOL/L (ref 3.6–5.5)
PROT UR QL STRIP: NEGATIVE MG/DL
PROTHROMBIN TIME: 12.7 SEC (ref 12–14.6)
RBC # BLD AUTO: 3.16 M/UL (ref 4.7–6.1)
RBC UR QL AUTO: NEGATIVE
SODIUM SERPL-SCNC: 129 MMOL/L (ref 135–145)
SP GR UR STRIP.AUTO: 1.01
TROPONIN I SERPL-MCNC: <0.01 NG/ML (ref 0–0.04)
WBC # BLD AUTO: 5.9 K/UL (ref 4.8–10.8)

## 2017-05-11 PROCEDURE — 73552 X-RAY EXAM OF FEMUR 2/>: CPT | Mod: LT

## 2017-05-11 PROCEDURE — 84484 ASSAY OF TROPONIN QUANT: CPT

## 2017-05-11 PROCEDURE — 96375 TX/PRO/DX INJ NEW DRUG ADDON: CPT

## 2017-05-11 PROCEDURE — 700105 HCHG RX REV CODE 258

## 2017-05-11 PROCEDURE — C1713 ANCHOR/SCREW BN/BN,TIS/BN: HCPCS | Performed by: ORTHOPAEDIC SURGERY

## 2017-05-11 PROCEDURE — 99285 EMERGENCY DEPT VISIT HI MDM: CPT

## 2017-05-11 PROCEDURE — 700105 HCHG RX REV CODE 258: Performed by: INTERNAL MEDICINE

## 2017-05-11 PROCEDURE — 770006 HCHG ROOM/CARE - MED/SURG/GYN SEMI*

## 2017-05-11 PROCEDURE — 160041 HCHG SURGERY MINUTES - EA ADDL 1 MIN LEVEL 4: Performed by: ORTHOPAEDIC SURGERY

## 2017-05-11 PROCEDURE — 85730 THROMBOPLASTIN TIME PARTIAL: CPT

## 2017-05-11 PROCEDURE — 72170 X-RAY EXAM OF PELVIS: CPT

## 2017-05-11 PROCEDURE — 700111 HCHG RX REV CODE 636 W/ 250 OVERRIDE (IP)

## 2017-05-11 PROCEDURE — 0QS704Z REPOSITION LEFT UPPER FEMUR WITH INTERNAL FIXATION DEVICE, OPEN APPROACH: ICD-10-PCS | Performed by: ORTHOPAEDIC SURGERY

## 2017-05-11 PROCEDURE — 96374 THER/PROPH/DIAG INJ IV PUSH: CPT

## 2017-05-11 PROCEDURE — 82962 GLUCOSE BLOOD TEST: CPT

## 2017-05-11 PROCEDURE — 85610 PROTHROMBIN TIME: CPT

## 2017-05-11 PROCEDURE — 160029 HCHG SURGERY MINUTES - 1ST 30 MINS LEVEL 4: Performed by: ORTHOPAEDIC SURGERY

## 2017-05-11 PROCEDURE — 700111 HCHG RX REV CODE 636 W/ 250 OVERRIDE (IP): Performed by: INTERNAL MEDICINE

## 2017-05-11 PROCEDURE — 81003 URINALYSIS AUTO W/O SCOPE: CPT

## 2017-05-11 PROCEDURE — 502240 HCHG MISC OR SUPPLY RC 0272: Performed by: ORTHOPAEDIC SURGERY

## 2017-05-11 PROCEDURE — 700111 HCHG RX REV CODE 636 W/ 250 OVERRIDE (IP): Performed by: EMERGENCY MEDICINE

## 2017-05-11 PROCEDURE — 700111 HCHG RX REV CODE 636 W/ 250 OVERRIDE (IP): Performed by: NURSE PRACTITIONER

## 2017-05-11 PROCEDURE — 700102 HCHG RX REV CODE 250 W/ 637 OVERRIDE(OP): Performed by: INTERNAL MEDICINE

## 2017-05-11 PROCEDURE — 700102 HCHG RX REV CODE 250 W/ 637 OVERRIDE(OP)

## 2017-05-11 PROCEDURE — 73502 X-RAY EXAM HIP UNI 2-3 VIEWS: CPT | Mod: LT

## 2017-05-11 PROCEDURE — 160036 HCHG PACU - EA ADDL 30 MINS PHASE I: Performed by: ORTHOPAEDIC SURGERY

## 2017-05-11 PROCEDURE — 160035 HCHG PACU - 1ST 60 MINS PHASE I: Performed by: ORTHOPAEDIC SURGERY

## 2017-05-11 PROCEDURE — 71010 DX-CHEST-LIMITED (1 VIEW): CPT

## 2017-05-11 PROCEDURE — 700111 HCHG RX REV CODE 636 W/ 250 OVERRIDE (IP): Performed by: ANESTHESIOLOGY

## 2017-05-11 PROCEDURE — 83880 ASSAY OF NATRIURETIC PEPTIDE: CPT

## 2017-05-11 PROCEDURE — A6402 STERILE GAUZE <= 16 SQ IN: HCPCS | Performed by: ORTHOPAEDIC SURGERY

## 2017-05-11 PROCEDURE — 85025 COMPLETE CBC W/AUTO DIFF WBC: CPT

## 2017-05-11 PROCEDURE — 700105 HCHG RX REV CODE 258: Performed by: EMERGENCY MEDICINE

## 2017-05-11 PROCEDURE — 700101 HCHG RX REV CODE 250

## 2017-05-11 PROCEDURE — A9270 NON-COVERED ITEM OR SERVICE: HCPCS

## 2017-05-11 PROCEDURE — 80048 BASIC METABOLIC PNL TOTAL CA: CPT

## 2017-05-11 PROCEDURE — 160048 HCHG OR STATISTICAL LEVEL 1-5: Performed by: ORTHOPAEDIC SURGERY

## 2017-05-11 PROCEDURE — 500891 HCHG PACK, ORTHO MAJOR: Performed by: ORTHOPAEDIC SURGERY

## 2017-05-11 PROCEDURE — 502000 HCHG MISC OR IMPLANTS RC 0278: Performed by: ORTHOPAEDIC SURGERY

## 2017-05-11 PROCEDURE — 160009 HCHG ANES TIME/MIN: Performed by: ORTHOPAEDIC SURGERY

## 2017-05-11 PROCEDURE — 99223 1ST HOSP IP/OBS HIGH 75: CPT | Performed by: INTERNAL MEDICINE

## 2017-05-11 PROCEDURE — 160002 HCHG RECOVERY MINUTES (STAT): Performed by: ORTHOPAEDIC SURGERY

## 2017-05-11 PROCEDURE — 501838 HCHG SUTURE GENERAL: Performed by: ORTHOPAEDIC SURGERY

## 2017-05-11 DEVICE — SCREW CORTEX 4.5X40 ST OIC - (T=10): Type: IMPLANTABLE DEVICE | Site: HIP | Status: FUNCTIONAL

## 2017-05-11 DEVICE — PLATE SHS 135/2 STD BARREL - (T=1): Type: IMPLANTABLE DEVICE | Site: HIP | Status: FUNCTIONAL

## 2017-05-11 DEVICE — IMPLANTABLE DEVICE: Type: IMPLANTABLE DEVICE | Site: HIP | Status: FUNCTIONAL

## 2017-05-11 DEVICE — SCREW COMPRESSION 36MM OIC - (T=2): Type: IMPLANTABLE DEVICE | Site: HIP | Status: FUNCTIONAL

## 2017-05-11 RX ORDER — OXYCODONE HYDROCHLORIDE 5 MG/1
2.5 TABLET ORAL
Status: DISCONTINUED | OUTPATIENT
Start: 2017-05-11 | End: 2017-05-11

## 2017-05-11 RX ORDER — OXYCODONE HYDROCHLORIDE 5 MG/1
2.5 TABLET ORAL
Status: DISCONTINUED | OUTPATIENT
Start: 2017-05-11 | End: 2017-05-23 | Stop reason: HOSPADM

## 2017-05-11 RX ORDER — LEVOTHYROXINE SODIUM 0.05 MG/1
50 TABLET ORAL
Status: DISCONTINUED | OUTPATIENT
Start: 2017-05-12 | End: 2017-05-11

## 2017-05-11 RX ORDER — SODIUM CHLORIDE 9 MG/ML
INJECTION, SOLUTION INTRAVENOUS
Status: COMPLETED
Start: 2017-05-11 | End: 2017-05-11

## 2017-05-11 RX ORDER — SODIUM CHLORIDE 9 MG/ML
INJECTION, SOLUTION INTRAVENOUS CONTINUOUS
Status: DISCONTINUED | OUTPATIENT
Start: 2017-05-11 | End: 2017-05-13

## 2017-05-11 RX ORDER — MIDAZOLAM HYDROCHLORIDE 1 MG/ML
INJECTION INTRAMUSCULAR; INTRAVENOUS
Status: COMPLETED
Start: 2017-05-11 | End: 2017-05-11

## 2017-05-11 RX ORDER — AMOXICILLIN 250 MG
2 CAPSULE ORAL 2 TIMES DAILY
Status: DISCONTINUED | OUTPATIENT
Start: 2017-05-11 | End: 2017-05-23 | Stop reason: HOSPADM

## 2017-05-11 RX ORDER — AMOXICILLIN 250 MG
2 CAPSULE ORAL 2 TIMES DAILY
Status: DISCONTINUED | OUTPATIENT
Start: 2017-05-11 | End: 2017-05-11

## 2017-05-11 RX ORDER — ONDANSETRON 2 MG/ML
4 INJECTION INTRAMUSCULAR; INTRAVENOUS EVERY 4 HOURS PRN
Status: DISCONTINUED | OUTPATIENT
Start: 2017-05-11 | End: 2017-05-23 | Stop reason: HOSPADM

## 2017-05-11 RX ORDER — MAGNESIUM HYDROXIDE 1200 MG/15ML
LIQUID ORAL
Status: DISCONTINUED | OUTPATIENT
Start: 2017-05-11 | End: 2017-05-11 | Stop reason: HOSPADM

## 2017-05-11 RX ORDER — HALOPERIDOL 5 MG/ML
1 INJECTION INTRAMUSCULAR EVERY 4 HOURS PRN
Status: DISCONTINUED | OUTPATIENT
Start: 2017-05-11 | End: 2017-05-23 | Stop reason: HOSPADM

## 2017-05-11 RX ORDER — LABETALOL HYDROCHLORIDE 5 MG/ML
10 INJECTION, SOLUTION INTRAVENOUS EVERY 4 HOURS PRN
Status: DISCONTINUED | OUTPATIENT
Start: 2017-05-11 | End: 2017-05-23 | Stop reason: HOSPADM

## 2017-05-11 RX ORDER — QUETIAPINE FUMARATE 100 MG/1
200 TABLET, FILM COATED ORAL EVERY EVENING
Status: DISCONTINUED | OUTPATIENT
Start: 2017-05-11 | End: 2017-05-11

## 2017-05-11 RX ORDER — ONDANSETRON 4 MG/1
4 TABLET, ORALLY DISINTEGRATING ORAL EVERY 4 HOURS PRN
Status: DISCONTINUED | OUTPATIENT
Start: 2017-05-11 | End: 2017-05-23 | Stop reason: HOSPADM

## 2017-05-11 RX ORDER — DEXTROSE MONOHYDRATE 25 G/50ML
25 INJECTION, SOLUTION INTRAVENOUS
Status: DISCONTINUED | OUTPATIENT
Start: 2017-05-11 | End: 2017-05-23 | Stop reason: HOSPADM

## 2017-05-11 RX ORDER — FERROUS SULFATE 325(65) MG
325 TABLET ORAL DAILY
Status: DISCONTINUED | OUTPATIENT
Start: 2017-05-11 | End: 2017-05-23 | Stop reason: HOSPADM

## 2017-05-11 RX ORDER — LABETALOL HYDROCHLORIDE 5 MG/ML
INJECTION, SOLUTION INTRAVENOUS
Status: COMPLETED
Start: 2017-05-11 | End: 2017-05-11

## 2017-05-11 RX ORDER — POLYETHYLENE GLYCOL 3350 17 G/17G
1 POWDER, FOR SOLUTION ORAL
Status: DISCONTINUED | OUTPATIENT
Start: 2017-05-11 | End: 2017-05-23 | Stop reason: HOSPADM

## 2017-05-11 RX ORDER — MORPHINE SULFATE 4 MG/ML
4 INJECTION, SOLUTION INTRAMUSCULAR; INTRAVENOUS ONCE
Status: COMPLETED | OUTPATIENT
Start: 2017-05-11 | End: 2017-05-11

## 2017-05-11 RX ORDER — MIDAZOLAM HYDROCHLORIDE 1 MG/ML
1 INJECTION INTRAMUSCULAR; INTRAVENOUS ONCE
Status: COMPLETED | OUTPATIENT
Start: 2017-05-11 | End: 2017-05-11

## 2017-05-11 RX ORDER — BISACODYL 10 MG
10 SUPPOSITORY, RECTAL RECTAL
Status: DISCONTINUED | OUTPATIENT
Start: 2017-05-11 | End: 2017-05-23 | Stop reason: HOSPADM

## 2017-05-11 RX ORDER — HEPARIN SODIUM 5000 [USP'U]/ML
5000 INJECTION, SOLUTION INTRAVENOUS; SUBCUTANEOUS EVERY 8 HOURS
Status: DISCONTINUED | OUTPATIENT
Start: 2017-05-11 | End: 2017-05-11

## 2017-05-11 RX ORDER — MORPHINE SULFATE 4 MG/ML
2 INJECTION, SOLUTION INTRAMUSCULAR; INTRAVENOUS
Status: DISCONTINUED | OUTPATIENT
Start: 2017-05-11 | End: 2017-05-11

## 2017-05-11 RX ORDER — DULOXETIN HYDROCHLORIDE 60 MG/1
60 CAPSULE, DELAYED RELEASE ORAL DAILY
Status: DISCONTINUED | OUTPATIENT
Start: 2017-05-12 | End: 2017-05-11

## 2017-05-11 RX ORDER — SODIUM CHLORIDE 9 MG/ML
INJECTION, SOLUTION INTRAVENOUS CONTINUOUS
Status: DISCONTINUED | OUTPATIENT
Start: 2017-05-11 | End: 2017-05-11

## 2017-05-11 RX ORDER — FAMOTIDINE 20 MG/1
20 TABLET, FILM COATED ORAL DAILY
Status: DISCONTINUED | OUTPATIENT
Start: 2017-05-12 | End: 2017-05-11

## 2017-05-11 RX ORDER — ONDANSETRON 2 MG/ML
4 INJECTION INTRAMUSCULAR; INTRAVENOUS ONCE
Status: COMPLETED | OUTPATIENT
Start: 2017-05-11 | End: 2017-05-11

## 2017-05-11 RX ORDER — OXYCODONE HYDROCHLORIDE 5 MG/1
10 TABLET ORAL EVERY 4 HOURS PRN
Status: DISCONTINUED | OUTPATIENT
Start: 2017-05-11 | End: 2017-05-11

## 2017-05-11 RX ORDER — POLYETHYLENE GLYCOL 3350 17 G/17G
1 POWDER, FOR SOLUTION ORAL
Status: DISCONTINUED | OUTPATIENT
Start: 2017-05-11 | End: 2017-05-11

## 2017-05-11 RX ORDER — BISACODYL 10 MG
10 SUPPOSITORY, RECTAL RECTAL
Status: DISCONTINUED | OUTPATIENT
Start: 2017-05-11 | End: 2017-05-11

## 2017-05-11 RX ORDER — ACETAMINOPHEN 325 MG/1
650 TABLET ORAL EVERY 8 HOURS
Status: DISCONTINUED | OUTPATIENT
Start: 2017-05-11 | End: 2017-05-23 | Stop reason: HOSPADM

## 2017-05-11 RX ORDER — OXYCODONE HYDROCHLORIDE 5 MG/1
5 TABLET ORAL
Status: DISCONTINUED | OUTPATIENT
Start: 2017-05-11 | End: 2017-05-11

## 2017-05-11 RX ORDER — OXYCODONE HYDROCHLORIDE 5 MG/1
5 TABLET ORAL
Status: DISCONTINUED | OUTPATIENT
Start: 2017-05-11 | End: 2017-05-23 | Stop reason: HOSPADM

## 2017-05-11 RX ORDER — ACETAMINOPHEN 650 MG/1
975 SUPPOSITORY RECTAL EVERY 6 HOURS
Status: DISCONTINUED | OUTPATIENT
Start: 2017-05-12 | End: 2017-05-11

## 2017-05-11 RX ADMIN — INSULIN LISPRO 3 UNITS: 100 INJECTION, SOLUTION INTRAVENOUS; SUBCUTANEOUS at 23:02

## 2017-05-11 RX ADMIN — HALOPERIDOL LACTATE 1 MG: 5 INJECTION, SOLUTION INTRAMUSCULAR at 22:35

## 2017-05-11 RX ADMIN — SODIUM CHLORIDE: 9 INJECTION, SOLUTION INTRAVENOUS at 12:55

## 2017-05-11 RX ADMIN — MORPHINE SULFATE 4 MG: 4 INJECTION INTRAVENOUS at 12:55

## 2017-05-11 RX ADMIN — SODIUM CHLORIDE: 9 INJECTION, SOLUTION INTRAVENOUS at 16:22

## 2017-05-11 RX ADMIN — FENTANYL CITRATE 50 MCG: 50 INJECTION, SOLUTION INTRAMUSCULAR; INTRAVENOUS at 20:20

## 2017-05-11 RX ADMIN — FENTANYL CITRATE 50 MCG: 50 INJECTION, SOLUTION INTRAMUSCULAR; INTRAVENOUS at 20:45

## 2017-05-11 RX ADMIN — MIDAZOLAM 1 MG: 1 INJECTION INTRAMUSCULAR; INTRAVENOUS at 20:50

## 2017-05-11 RX ADMIN — ONDANSETRON 4 MG: 2 INJECTION, SOLUTION INTRAMUSCULAR; INTRAVENOUS at 12:55

## 2017-05-11 RX ADMIN — LABETALOL HYDROCHLORIDE 10 MG: 5 INJECTION, SOLUTION INTRAVENOUS at 20:15

## 2017-05-11 RX ADMIN — MORPHINE SULFATE 2 MG: 4 INJECTION INTRAVENOUS at 16:24

## 2017-05-11 RX ADMIN — FENTANYL CITRATE 25 MCG: 50 INJECTION, SOLUTION INTRAMUSCULAR; INTRAVENOUS at 22:48

## 2017-05-11 ASSESSMENT — PAIN SCALES - GENERAL
PAINLEVEL_OUTOF10: 4
PAINLEVEL_OUTOF10: ASSUMED PAIN PRESENT
PAINLEVEL_OUTOF10: 3
PAINLEVEL_OUTOF10: ASSUMED PAIN PRESENT
PAINLEVEL_OUTOF10: 5
PAINLEVEL_OUTOF10: ASSUMED PAIN PRESENT
PAINLEVEL_OUTOF10: 5
PAINLEVEL_OUTOF10: 3
PAINLEVEL_OUTOF10: 3

## 2017-05-11 ASSESSMENT — PAIN SCALES - WONG BAKER
WONGBAKER_NUMERICALRESPONSE: HURTS EVEN MORE
WONGBAKER_NUMERICALRESPONSE: HURTS EVEN MORE

## 2017-05-11 ASSESSMENT — LIFESTYLE VARIABLES
ALCOHOL_USE: NO
DO YOU DRINK ALCOHOL: NO
EVER_SMOKED: NEVER

## 2017-05-11 NOTE — ED NOTES
"Chief Complaint   Patient presents with   • T-5000 Extremity Pain     BIB [facility] from home.  Pt fell at home about 2200.  C/O left hip pain, unable to bear weight   Pt is confused.  When asked why he is here states \"Drugs, since I was 11 years old\".  EMS reported a strong smell of urine.  Pt wearing adult diapers.  "

## 2017-05-11 NOTE — ED NOTES
The Medication Reconciliation process has been PARTIALLY completed by interviewing the patient who does not know his meds currently so I called his pharmacy for a current list.     Allergies have been reviewed  Antibiotic use in 30 days - None per pharmacy    Home Pharmacy:  [facility]

## 2017-05-11 NOTE — ED NOTES
Pt has returned from imaging and has been medicated for pain.  Family at bedside.  PMH obtained from them due to pt's dementia.

## 2017-05-11 NOTE — IP AVS SNAPSHOT
Home Care Instructions                                                                                                                  Name:Santosh Casas  Medical Record Number:[id number]  CSN: [id number]    YOB: 1934   Age: 83 y.o.  Sex: male  HT:1.829 m (6') WT: 59 kg (130 lb 1.1 oz)          Admit Date: 5/11/2017     Discharge Date:   Today's Date: 5/22/2017  Attending Doctor:  Janet Wheeler M.D.                  Allergies:  Nkda            Discharge Instructions       Discharge Instructions    Discharged to other by [facility] van with self. Discharged via wheelchair, hospital escort: Yes.  Special equipment needed: Not Applicable    Be sure to schedule a follow-up appointment with your primary care doctor or any specialists as instructed.   Pt going to [facility] and will follow MD there.     Discharge Plan:   Influenza Vaccine Indication: Patient Refuses    I understand that a diet low in cholesterol, fat, and sodium is recommended for good health. Unless I have been given specific instructions below for another diet, I accept this instruction as my diet prescription.   Other : Dysphagia (pureed with nectar thick liquids)    Special Instructions: Discharge instructions for the Orthopedic Patient    Follow up with Primary Care Physician within 2 weeks of discharge to home, regarding:  Review of medications and diagnostic testing.  Surveillance for medical complications.  Workup and treatment of osteoporosis, if appropriate.     -Is this a Joint Replacement patient? Yes   Total Joint Hip Replacement Discharge Instructions    Pain  - The goal is to slowly wean off the prescription pain medicine.  - Ice can be used for pain control.  20 minutes at a time is recommended, and never directly against your skin or incision.  - Most patients are off the pain pills by 3 weeks; others may require a low level of pain medications for many months. If your pain continues to be severe, follow up with  your physician.  Infection  Deep hip joint infections that require removal of the prostheses occur in less than 0.1% of patients. Lesser infections in the skin (cellulites) are more common and much more easily treated.  - Keep the incision as clean and dry as possible.  - Always wash your hands before touching your incision.  - Skin infections tend to develop around 7-10 days after surgery, most can be treated with oral antibiotics.  - Dental Care should be delayed for 3 months after surgery, your surgeon recommends taking a dose of antibiotics 1 hour prior to any dental procedure.  After 2 years, most surgeons recommend antibiotics only before an extensive procedure.  Ask your surgeon what he recommends.  - Signs and symptoms of infection can include:  low grade fever, redness, pain, swelling and drainage from your incision.  Notify your surgeon immediately if you develop any of these symptoms.  Post op Disturbances  - Bowel habits - constipation is extremely common and is caused by a combination of anesthesia, lack of mobility and pain medicine.  Use stool softeners or laxatives if necessary. It is important not to ignore this problem, as bowel obstructions can be a serious complication after joint replacement surgery.  - Mood/Energy Level - Many patients experience a lack of energy and endurance for up to 2-3 months after surgery.  Some may also feel down and can even become depressed.  This is likely due to the postoperative anemia, change in activity level, lack of sleep, pain medicine and just the emotional reaction to the surgery itself that is a big disruption in a person’s life.  This usually passes.  If symptoms persist, follow up with your primary physician.  - Returning to work - Your surgeon will give you more specific instructions.  Generally, if you work a sedentary job requiring little standing or walking, most patients may return within 2-6 weeks.  Manual labor jobs involving walking, lifting and  standing may take 3-4 months.  Your surgeon’s office can provide a release to part-time or light duty work early on in your recovery and progress you to full duty as able.  - Driving - You can begin driving an automatic shift car in 4 to 8 weeks, provided you are no longer taking narcotic pain medication. If you have a stick-shift car and your right hip was replaced, do not begin driving until your doctor says you can.   - Avoiding falls -  throw rugs and tack down loose carpeting.  Be aware of floor hazards such as pets, small objects or uneven surfaces.   -  Airport Metal Detectors - The sensitivity of metal detectors varies and it is likely that your prosthesis will cause an alarm. Inform the  that you have an artificial joint.  Diet  - Resume your normal diet as tolerated.  - It is important to achieve a healthy nutritional status by eating a well balanced diet on a regular basis.  - Your physician may recommend that you take iron and vitamin supplements.   - Continue to drink plenty of fluids.  Shower/Bathing  - You may shower as soon as you get home from the hospital unless otherwise instructed.  - Keep your incision out of water.  To keep the incision dry when showering, cover it with a plastic bag or plastic wrap.  - Pat incision dry if it gets wet.  Don’t rub.  - Do not submerge in a bath until staples are out and the incision is completely healed. (Approximately 6-8 weeks after surgery).  Dressing Change:  Procedure (if recommended by your physician)  - Wash hands.  - Open all dressing change materials.  - Remove old dressing and discard.  - Inspect incision for redness, increase in clear drainage, yellow/green drainage, odor and surrounding skin hot to touch.  -  ABD (large gauze) pad by one corner and lay over the incision.  Be careful not to touch the inside of the dressing that will lay over the incision.  - Secure in place as instructed (Ace wrap or  tape).    Swelling/Bruising  - Swelling is normal after hip replacement and can involve the thigh, knee, calf and foot.  - Swelling can last from 3-6 months.  - Elevate your leg higher than your heart while reclining.  The first week you are home you should elevate your leg an equal amount of time, as you are active.    - Anti-inflammatory pills can be taken once you have stopped the blood thinners.  - The swelling is usually worse after you go home since you are upright for longer periods of time.  - Bruising is common and can involve the entire leg including the thigh, calf and even foot.  Bruising often does not appear until after you arrive home and it can be quite dramatic- purple, black, green.  The bruising you can see is not usually concerning and will subside without any treatment.      Blood Clot Prevention  Blood clots in the legs and the less common, but frightening, clots that travel to the lungs are a real focus of our preventative. Most patients are at standard risk for them, but those patients who are at higher risk include people who have had previous clots, a family history of clotting, smoking, diabetes, obesity, advanced age, use of estrogen and a sedentary lifestyle.    - Signs of blood clots in legs - Swelling in thigh, calf or ankle that does not go down with elevation.  Pain, heat and tenderness in calf, back of calf or groin area.  NOTE: blood clots can occur in either leg.  - You have been receiving anticoagulant therapy (blood thinners) in the hospital and you may be instructed to continue at home depending on your risk factors.  - Your risk for developing a clot continues for up to 2-3 months after surgery.  You should avoid prolonged sitting and dehydration during that time (long air trips and car trips).  If you do take a trip during this time, please get up and move around every 1- 1.5 hours.  - If you are prescribed blood thinning medication for home, follow instructions as directed.  (Handouts provided if applicable).      Activity    Once you get home, you should stay active. The key is not to overdo it! While you can expect some good days and some bad days, you should notice a gradual improvement over time you should notice a gradual improvement and a gradual increase in your endurance over the next 6 to 12 months.    - Weight Bearing - If you have undergone cemented or hybrid hip replacement, you can put some weight on the leg immediately using a cane or walker, and you should continue to use some support for 4 to 6 weeks to help the muscles recover.   - Sleeping Positions - Sleep on your back with your legs slightly apart or on your side with a regular pillow between your knees. Be sure to use the pillow for at least 6 weeks, or until your doctor says you can do without it. Sleeping on your stomach should be all right  - Sitting - For at least the first 3 months, sit only in chairs that have arms. Do not sit on low chairs, low stools, or reclining chairs. Do not cross your legs at the knees. The physical therapist will show you how to sit and stand from a chair, keeping your affected leg out in front of you. Get up and move around on a regular basis--at least once every hour.  - Walking - Walk as much as you like once your doctor gives you the go-ahead, but remember that walking is no substitute for your prescribed exercises. Walking with a pair of trekking poles is helpful and adds as much as 40% to the exercise you get when you walk  - Therapy may be needed in some cases, to strengthen your muscles and improve your gait (walking pattern).  This decision will be made at your post-operative appointment.  Follow your therapist recommended post-operative exercises (handout provided by Therapist).  - Swimming is also recommended; you can begin as soon as the sutures have been removed and the wound is healed, approximately 6 to 8 weeks after surgery. Using a pair of training fins may make  swimming a more enjoyable and effective exercise.  - Other activities - Lower impact activities are preferred.  If you have specific questions, consult your Surgeon.    - Sexual activity - Your surgeon can tell you when it should be safe to resume sexual activity.      When to Call the Doctor   Call the physician if:   - Fever over 100.5? F  - Increased pain, drainage, redness, odor or heat around the incision area  - Shaking chills  - Increased knee pain with activity and rest  - Increased pain in calf, tenderness or redness above or below the knee  - Increased swelling of calf, ankle, foot  - Sudden increased shortness of breath, sudden onset of chest pain, localized chest pain with coughing  - Incision opening  Or, if there are any questions or concerns about medications or care.       -Is this patient being discharged with medication to prevent blood clots?  No    · Is patient discharged on Warfarin / Coumadin?   No     · Is patient Post Blood Transfusion?  No    Depression / Suicide Risk    As you are discharged from this [facility] facility, it is important to learn how to keep safe from harming yourself.    Recognize the warning signs:  · Abrupt changes in personality, positive or negative- including increase in energy   · Giving away possessions  · Change in eating patterns- significant weight changes-  positive or negative  · Change in sleeping patterns- unable to sleep or sleeping all the time   · Unwillingness or inability to communicate  · Depression  · Unusual sadness, discouragement and loneliness  · Talk of wanting to die  · Neglect of personal appearance   · Rebelliousness- reckless behavior  · Withdrawal from people/activities they love  · Confusion- inability to concentrate     If you or a loved one observes any of these behaviors or has concerns about self-harm, here's what you can do:  · Talk about it- your feelings and reasons for harming yourself  · Remove any means that you might use to  hurt yourself (examples: pills, rope, extension cords, firearm)  · Get professional help from the community (Mental Health, Substance Abuse, psychological counseling)  · Do not be alone:Call your Safe Contact- someone whom you trust who will be there for you.  · Call your local CRISIS HOTLINE 772-3959 or 283-948-0216  · Call your local Children's Mobile Crisis Response Team [place] (472) 307-8954 or www.[employer]  · Call the toll free National Suicide Prevention Hotlines   · National Suicide Prevention Lifeline 844-468-KJFT (6529)  · National Hope Line Network 800-SUICIDE (696-3413)        Your appointments     Jun 16, 2017  9:40 AM   Established Patient with GARCIA Valentine   [facility] (--)    [address]  [address]  [place] [id number] [phone]           You will be receiving a confirmation call a few days before your appointment from our automated call confirmation system.            Jul 17, 2017 10:15 AM   New Patient with Giovani Sanders M.D.   [facility] (--)    [address]  [place] [address] [phone]           Please bring Photo ID, Insurance Cards, All Medication Bottles and copies of any legal documents (such as Living Will, Power of ) If speaking a language besides English please bring an adult . Please arrive 30 minutes prior for check in and registration. You will be receiving a confirmation call a few days before your appointment from our automated call confirmation system.                 Discharge Medication Instructions:    Below are the medications your physician expects you to take upon discharge:    Review all your home medications and newly ordered medications with your doctor and/or pharmacist. Follow medication instructions as directed by your doctor and/or pharmacist.    Please keep your medication list with you and share with your physician.               Medication List       START taking these medications        Instructions    Morning Afternoon Evening Bedtime    amlodipine 5 MG Tabs   Last time this was given:  5 mg on 5/22/2017  8:35 AM   Commonly known as:  NORVASC        Take 1 Tab by mouth every day.   Dose:  5 mg                        divalproex  MG Tb24   Last time this was given:  250 mg on 5/22/2017  8:35 AM   Commonly known as:  DEPAKOTE ER        Take 1 Tab by mouth every day.   Dose:  250 mg                        lisinopril 10 MG Tabs   Last time this was given:  10 mg on 5/22/2017  8:34 AM   Commonly known as:  PRINIVIL        Take 1 Tab by mouth every day.   Dose:  10 mg                        tamsulosin 0.4 MG capsule   Last time this was given:  0.4 mg on 5/22/2017  8:34 AM   Commonly known as:  FLOMAX        Take 1 Cap by mouth ONE-HALF HOUR AFTER BREAKFAST.   Dose:  0.4 mg                          CHANGE how you take these medications        Instructions    Morning Afternoon Evening Bedtime    quetiapine 300 MG tablet   What changed:    - medication strength  - how much to take   Last time this was given:  300 mg on 5/21/2017  8:45 PM   Commonly known as:  SEROQUEL        Take 1 Tab by mouth every evening.   Dose:  300 mg                          CONTINUE taking these medications        Instructions    Morning Afternoon Evening Bedtime    docusate sodium 100 MG Caps   Commonly known as:  COLACE        Take 1 Cap by mouth 2 times a day.   Dose:  100 mg                        duloxetine 60 MG Cpep delayed-release capsule   Last time this was given:  60 mg on 5/22/2017  8:34 AM   Commonly known as:  CYMBALTA        Take 1 Cap by mouth every day.   Dose:  60 mg                        famotidine 20 MG Tabs   Last time this was given:  20 mg on 5/22/2017  8:35 AM   Commonly known as:  PEPCID        Take 1 Tab by mouth 2 times a day.   Dose:  20 mg                        ferrous sulfate 325 (65 FE) MG tablet   Last time this was given:  325 mg on 5/22/2017  9:00 AM         Take 1 Tab by mouth every day. With 250 mg vit C and a stool softner such as colace   Dose:  325 mg                        levothyroxine 50 MCG Tabs   Last time this was given:  50 mcg on 5/22/2017  5:37 AM   Commonly known as:  SYNTHROID        Take 1 Tab by mouth Every morning on an empty stomach.   Dose:  50 mcg                        oxycodone immediate release 10 MG immediate release tablet   Last time this was given:  5 mg on 5/18/2017 10:03 PM   Commonly known as:  ROXICODONE        Doctor's comments:  May fill 5/17/2017   Take 1 Tab by mouth every four hours as needed for Severe Pain.   Dose:  10 mg                        psyllium 58.12 % Pack   Commonly known as:  METAMUCIL        Take 1 Packet by mouth every day.   Dose:  1 Packet                             Where to Get Your Medications      Information about where to get these medications is not yet available     ! Ask your nurse or doctor about these medications    - amlodipine 5 MG Tabs  - divalproex  MG Tb24  - lisinopril 10 MG Tabs  - quetiapine 300 MG tablet  - tamsulosin 0.4 MG capsule            Instructions           Diet / Nutrition:    Follow any diet instructions given to you by your doctor or the dietician, including how much salt (sodium) you are allowed each day.    If you are overweight, talk to your doctor about a weight reduction plan.    Activity:    Remain physically active following your doctor's instructions about exercise and activity.    Rest often.     Any time you become even a little tired or short of breath, SIT DOWN and rest.    Worsening Symptoms:    Report any of the following signs and symptoms to the doctor's office immediately:    *Pain of jaw, arm, or neck  *Chest pain not relieved by medication                               *Dizziness or loss of consciousness  *Difficulty breathing even when at rest   *More tired than usual                                       *Bleeding drainage or swelling of surgical  site  *Swelling of feet, ankles, legs or stomach                 *Fever (>100ºF)  *Pink or blood tinged sputum  *Weight gain (3lbs/day or 5lbs /week)           *Shock from internal defibrillator (if applicable)  *Palpitations or irregular heartbeats                *Cool and/or numb extremities    Stroke Awareness    Common Risk Factors for Stroke include:    Age  Atrial Fibrillation  Carotid Artery Stenosis  Diabetes Mellitus  Excessive alcohol consumption  High blood pressure  Overweight   Physical inactivity  Smoking    Warning signs and symptoms of a stroke include:    *Sudden numbness or weakness of the face, arm or leg (especially on one side of the body).  *Sudden confusion, trouble speaking or understanding.  *Sudden trouble seeing in one or both eyes.  *Sudden trouble walking, dizziness, loss of balance or coordination.Sudden severe headache with no known cause.    It is very important to get treatment quickly when a stroke occurs. If you experience any of the above warning signs, call 911 immediately.                   Disclaimer         Quit Smoking / Tobacco Use:    I understand the use of any tobacco products increases my chance of suffering from future heart disease or stroke and could cause other illnesses which may shorten my life. Quitting the use of tobacco products is the single most important thing I can do to improve my health. For further information on smoking / tobacco cessation call a Toll Free Quit Line at 1-436.490.2975 (*National Cancer [place]) or 1-392.988.5502 (American Lung Association) or you can access the web based program at www.lungusa.org.    [place] Tobacco Users Help Line:  (313) 523-1703       Toll Free: 1-509.274.9394  Quit Tobacco Program [facility] (176)823-0612    Crisis Hotline:    [place] Crisis Hotline:  2-169-THCCXAJ or 1-487.767.3834    [place] Crisis Hotline:    1-671.631.6078 or 579-421-7639    Discharge Survey:   Thank you for choosing  [facility]. We hope we did everything we could to make your hospital stay a pleasant one. You may be receiving a phone survey and we would appreciate your time and participation in answering the questions. Your input is very valuable to us in our efforts to improve our service to our patients and their families.        My signature on this form indicates that:    1. I have reviewed and understand the above information.  2. My questions regarding this information have been answered to my satisfaction.  3. I have formulated a plan with my discharge nurse to obtain my prescribed medications for home.                  Disclaimer         __________________________________                     __________       ________                       Patient Signature                                                 Date                    Time

## 2017-05-11 NOTE — ED NOTES
Pt has been extremely restless, pulling at gown and covers.  Attempted to help him use the urinal but does not understand the concept.  Incontinent of urine.

## 2017-05-11 NOTE — ED PROVIDER NOTES
ED Provider Note    Scribed for Ailyn Garcia M.D. by Luke Fernando. 5/11/2017, 12:13 PM.    Primary care provider: GARCIA Valentine  Means of arrival: EMS  History obtained from: Patient  History limited by: None    CHIEF COMPLAINT  Chief Complaint   Patient presents with   • T-5000 Extremity Pain     BIB ENS from home.  Pt fell at home about 2200.  C/O left hip pain, unable to bear weight       HPI  Santosh Casas is a 83 y.o. male who presents to the Emergency Department with left hip pain onset last night around 2200 after falling.  The patient says that he has been unable to bear weight, noting that the pain increases with movement.  The pain is localized and does not radiate.  He was unable to alleviate the pain at home prompting the visit this afternoon.  Denies any associated numbness or tingling in the affected extremity.    REVIEW OF SYSTEMS  Pertinent positives include left hip pain. Pertinent negatives include no numbness or tingling in the affected extremity. All other systems reviewed and negative.     PAST MEDICAL HISTORY   has a past medical history of Psychiatric disorder; Disease related peripheral neuropathy (CMS-HCC); Diabetes; Thyroid disease; Arthritis; Unspecified urinary incontinence; Dental disorder; Urinary bladder disorder; Bronchitis; Coughing blood; Sleep apnea; Pain; Other specified disorder of intestines; CATARACT; and Constipation (8/28/2013).    SURGICAL HISTORY   has past surgical history that includes cholecystectomy; laryngoscopy (5/18/2012); and esophagoscopy (5/18/2012).    SOCIAL HISTORY  Social History   Substance Use Topics   • Smoking status: Former Smoker -- 1.00 packs/day for 10 years     Types: Cigarettes     Quit date: 01/01/1965   • Smokeless tobacco: Never Used   • Alcohol Use: No      History   Drug Use No       FAMILY HISTORY  Family History   Problem Relation Age of Onset   • Heart Disease Father    • Diabetes Neg Hx    • Stroke Neg  Hx    • Cancer Neg Hx      stomach       CURRENT MEDICATIONS  No current facility-administered medications on file prior to encounter.     Current Outpatient Prescriptions on File Prior to Encounter   Medication Sig Dispense Refill   • duloxetine (CYMBALTA) 60 MG Cap DR Particles delayed-release capsule Take 1 Cap by mouth every day. 90 Cap 3   • quetiapine (SEROQUEL) 200 MG Tab Take 1 Tab by mouth every evening. 90 Tab 3   • oxycodone immediate release (ROXICODONE) 10 MG immediate release tablet Take 1 Tab by mouth every four hours as needed for Severe Pain. 60 Tab 0   • psyllium (METAMUCIL) 58.12 % Pack Take 1 Packet by mouth every day.     • famotidine (PEPCID) 20 MG Tab Take 1 Tab by mouth 2 times a day. 60 Tab 3   • docusate sodium (COLACE) 100 MG Cap Take 1 Cap by mouth 2 times a day. 180 Cap 3   • ferrous sulfate 325 (65 FE) MG tablet Take 1 Tab by mouth every day. With 250 mg vit C and a stool softner such as colace 30 Tab 3   • levothyroxine (SYNTHROID) 50 MCG Tab Take 1 Tab by mouth Every morning on an empty stomach. 90 Tab 3       ALLERGIES  Allergies   Allergen Reactions   • Nkda [No Known Drug Allergy]        PHYSICAL EXAM  VITAL SIGNS: /96 mmHg  Pulse 89  Temp(Src) 36.3 °C (97.3 °F)  Resp 24  Ht 1.829 m (6')  Wt 63.504 kg (140 lb)  BMI 18.98 kg/m2    Constitutional:  Elderly appearing, able to answer questions  HENT: Nose is normal in appearance without rhinorrhea, external ears are normal,  moist mucous membranes  Eyes: Anicteric,  pupils are equal round and reactive, there is no conjunctival drainage or pallor   Neck: The trachea is midline, there is no obvious mass or meningeal signs  Cardiovascular: Equal radial pulsation, Distal pulse intact, regular rate and rhythm without murmurs gallops or rubs  Thorax & Lungs: Respiratory rate and effort are normal. There is normal chest excursion with respiration.  No wheezes rhonchi or rales noted.  Abdomen: Abdomen is normal in appearance,   normal bowel sounds, no pain with cough, nonpulsatile  :  No CVA tenderness to palpation  Musculoskeletal: Left leg shorter than right by one  Skin: Visualized skin is warm, no erythema, no rash.  Neurologic:  Cranial nerves II through XII are intact there is no focal abnormality noted.  No focal weakness.  Exam limited secondary to leg pain.  Psychiatric: Normal mood and mentation    DIAGNOSTIC STUDIES / PROCEDURES    LABS  Results for orders placed or performed during the hospital encounter of 05/11/17   CBC w/ Differential   Result Value Ref Range    WBC 5.9 4.8 - 10.8 K/uL    RBC 3.16 (L) 4.70 - 6.10 M/uL    Hemoglobin 9.6 (L) 14.0 - 18.0 g/dL    Hematocrit 29.3 (L) 42.0 - 52.0 %    MCV 92.7 81.4 - 97.8 fL    MCH 30.4 27.0 - 33.0 pg    MCHC 32.8 (L) 33.7 - 35.3 g/dL    RDW 43.4 35.9 - 50.0 fL    Platelet Count 236 164 - 446 K/uL    MPV 8.6 (L) 9.0 - 12.9 fL    Neutrophils-Polys 83.50 (H) 44.00 - 72.00 %    Lymphocytes 4.70 (L) 22.00 - 41.00 %    Monocytes 11.00 0.00 - 13.40 %    Eosinophils 0.00 0.00 - 6.90 %    Basophils 0.30 0.00 - 1.80 %    Immature Granulocytes 0.50 0.00 - 0.90 %    Nucleated RBC 0.00 /100 WBC    Neutrophils (Absolute) 4.92 1.82 - 7.42 K/uL    Lymphs (Absolute) 0.28 (L) 1.00 - 4.80 K/uL    Monos (Absolute) 0.65 0.00 - 0.85 K/uL    Eos (Absolute) 0.00 0.00 - 0.51 K/uL    Baso (Absolute) 0.02 0.00 - 0.12 K/uL    Immature Granulocytes (abs) 0.03 0.00 - 0.11 K/uL    NRBC (Absolute) 0.00 K/uL   Basic Metabolic Panel (BMP)   Result Value Ref Range    Sodium 129 (L) 135 - 145 mmol/L    Potassium 4.0 3.6 - 5.5 mmol/L    Chloride 95 (L) 96 - 112 mmol/L    Co2 23 20 - 33 mmol/L    Glucose 202 (H) 65 - 99 mg/dL    Bun 14 8 - 22 mg/dL    Creatinine 1.14 0.50 - 1.40 mg/dL    Calcium 9.0 8.5 - 10.5 mg/dL    Anion Gap 11.0 0.0 - 11.9   Troponin   Result Value Ref Range    Troponin I <0.01 0.00 - 0.04 ng/mL   Btype Natriuretic Peptide (BNP)   Result Value Ref Range    B Natriuretic Peptide 42 0 - 100  pg/mL   Prothrombin (PT/INR)   Result Value Ref Range    PT 12.7 12.0 - 14.6 sec    INR 0.93 0.87 - 1.13   APTT   Result Value Ref Range    APTT 25.9 24.7 - 36.0 sec   Urinalysis, culture if indicated   Result Value Ref Range    Color Yellow     Character Clear     Specific Gravity 1.011 <1.035    Ph 6.0 5.0-8.0    Glucose Negative Negative mg/dL    Ketones Negative Negative mg/dL    Protein Negative Negative mg/dL    Bilirubin Negative Negative    Nitrite Negative Negative    Leukocyte Esterase Negative Negative    Occult Blood Negative Negative    Micro Urine Req see below     Culture Indicated No UA Culture   ESTIMATED GFR   Result Value Ref Range    GFR If African American >60 >60 mL/min/1.73 m 2    GFR If Non African American >60 >60 mL/min/1.73 m 2       All labs reviewed by me.      RADIOLOGY  DX-FEMUR-2+ LEFT   Final Result      Left proximal femoral fracture which involves the base of the neck and extends obliquely with some impaction.      DX-CHEST-LIMITED (1 VIEW)   Final Result      No evidence of acute cardiopulmonary process.      DX-PELVIS-1 OR 2 VIEWS   Final Result      Left proximal femoral fracture which involves the base of the femoral neck and is mildly displaced.        The radiologist's interpretation of all radiological studies have been reviewed by me.    COURSE & MEDICAL DECISION MAKING  Nursing notes and vital signs were reviewed. (See chart for details)  The patient's  records were reviewed, history was obtained from the patient and nurses.     The patient presents with left hip pain, and the differential diagnosis includes but is not limited to fracture, contusion, pelvic fracture.       12:13 PM Initial orders in the Emergency Department included xrays of the left femur, chest, pelvis and BNP, Prothrombin Time (PT/INR), APTT, Urinalysis, CBC with differential, BMP, Troponin and initial treatment in the Emergency Department included NS infusion, morphine 4 mg, zofran 4 mg.    ED testing  reveals that the patient has a femoral neck fracture.      On repeat evaluation of the patient, there was a good response to medications with decreased pain,  the patient's repeat exam was improved, he is still neurovascularly intact.    Additional work-up planned includes admission to the hospital for hip surgery.  This was discussed with Dr. Pantoja. I have also spoke with the hospitalist and the patient will remain nothing by mouth          DISPOSITION:  Patient will be admitted to the Hospitalist in guarded condition.      FINAL IMPRESSION  1. Hip fracture, left, closed, initial encounter (CMS-[facility])          Luke CHAU (Scribe), am scribing for, and in the presence of, Ailyn Garcia M.D..    Electronically signed by: Luke Fernando (Shanteibe), 5/11/2017    Ailyn CHAU M.D. personally performed the services described in this documentation, as scribed by Luke Fernando in my presence, and it is both accurate and complete.    The note accurately reflects work and decisions made by me.  Ailyn Garcia  5/11/2017  1:45 PM

## 2017-05-11 NOTE — ED NOTES
1152  Straight cath kit used to obtain urine specimen.  Urine not cloudy but does have strong smell.  Rubs left thigh and c/o pain.  Minimal shortening of left lower extremity noted.

## 2017-05-11 NOTE — H&P
PRIMARY CARE PHYSICIAN:  YASMINE Barnett    CHIEF COMPLAINT:  Ground level fall, left hip pain.    HISTORY OF PRESENT ILLNESS:  This is an 83-year-old male with a past medical   history significant for hypothyroidism, GERD, type 2 diabetes, squamous cell   carcinoma of tongue, who was brought in by EMS after he had a ground level   fall and could not bear weight on his left hip.  Patient has advanced dementia   and is unable to provide history.  His history is obtained from ER   physician's note and ER staff.  The patient is alert, awake, but oriented to   self only.  He cannot answer any questions for me at present time.  Patient on   imaging studies, was noted to have a left proximal femur fracture and Dr. Pantoja from orthopedics has been consulted who will take patient to OR later   today.    REVIEW OF SYSTEMS:  Unable to complete due to patient's advanced dementia.    Per nursing staff, the patient's wife states that this is at baseline.    PAST MEDICAL HISTORY:  Per chart review significant for GERD, hypothyroidism,   depression, iron deficiency, type 2 diabetes, hypertension, squamous cell   carcinoma of the lung.    SOCIAL HISTORY:  Unable to obtain due to dementia.    ALLERGIES:  No known drug allergies.    FAMILY HISTORY:  Unable to obtain due to dementia.    HOME MEDICATIONS:  Significant for Colace 1 cap twice a day, Cymbalta 60 mg   daily, Pepcid 20 mg twice a day, ferrous sulfate 325 mg daily, Synthroid 50   mcg daily, oxycodone 10 mg daily, Metamucil 1 packet daily, Seroquel 200 mg at   bedtime.    PHYSICAL EXAMINATION:  VITAL SIGNS:  Temperature of 36.7, heart rate of 89, respirations of 14, blood   pressure of 156/90, O2 saturation of 95% on room air.  GENERAL:  This is an 83-year-old thin, frail male, in no acute distress.  HEENT:  Normocephalic and atraumatic.  Pupils are equal and reactive to light.    Extraocular motions are intact.  Dry oral mucosa noted.  No oral exudate or    erythema noted.  NECK:  Supple.  No lymphadenopathy noted.  CARDIOVASCULAR:  Regular rate and rhythm.  No murmurs, rubs or gallops noted.  LUNGS:  Clear to auscultation bilaterally.  No rhonchi, wheezes, or rales   heard.  ABDOMEN:  Soft, nontender, and nondistended.  Bowel sounds present.  EXTREMITIES:  Left leg appears shorter than right leg.  Left leg is everted.  NEUROLOGIC:  Unable to assess as patient has advanced dementia and cannot   follow commands.  No focal deficits noted.  PSYCHIATRIC:  Unable to assess due to advanced dementia.    LABORATORY DATA:  WBC 5.9, hemoglobin of 9.6, hematocrit 29.3, platelets of   236.  Sodium 129, chloride of 95, glucose of 202.  Troponin is less than 0.01.    BNP of 42.    IMAGING STUDIES:  Chest x-ray shows no acute cardiopulmonary process.  X-ray   of pelvis shows left proximal femur fracture involving the base of the femur   neck and is mildly displaced.  X-ray of left femur shows left proximal femur   fracture, which involves the base of neck and extends obliquely with some   impaction.    ASSESSMENT AND PLAN:  1.  For patient's left proximal femur fracture, patient will be kept n.p.o.,   nonweightbearing at present time.  Dr. Pantoja from orthopedics has been   consulted who will take patient to the OR later today.  We will control   patient's pain.  Physical therapy and occupational therapy evaluation has been   ordered postop.  Patient is started on IV fluids.  2.  Advanced dementia, at baseline per wife.  3.  Concern for dysphagia.  Patient will be kept n.p.o. at present time.    Speech evaluation has been ordered.  I will resume patient's chronic pain   medication based on speech evaluation.  4.  Type 2 diabetes.  I will hold the patient's metformin.  The patient   started on insulin sliding scale.  5.  Depression.  I will hold patient's Cymbalta and Seroquel at present time   and resume them after speech evaluation.  6.  Hypothyroidism.  I will hold patient's  Synthroid 50 mcg at present time   and resume after speech evaluation.  7.  Gastroesophageal reflux disease.  The patient's Pepcid will be held until   further evaluation by speech.  8.  Hyponatremia.  This is likely secondary to hypovolemia.  Patient started   on IV fluids.  We will continue to monitor patient's BMP closely.  9.  Normocytic anemia, near baseline.  We will continue to monitor.    DISPOSITION:  Inpatient, patient will require greater than 2 midnights of   care.    PROPHYLAXIS:  Patient is presently on sequential compression devices for deep   venous thrombosis prophylaxis.  I will hold off on pharmacological deep venous   thrombosis prophylaxis until okay with orthopedics.  Bowel protocol   initiated.    CODE STATUS:  Full.       ____________________________________     BHUMIKA JUAREZ MD MS / ZEV    DD:  05/11/2017 16:05:45  DT:  05/11/2017 16:32:49    D#:  [id number]  Job#:  [id number]

## 2017-05-11 NOTE — IP AVS SNAPSHOT
5/22/2017    Santosh Casas  [address] [place] [address]    Dear Santosh:    [facility] wants to ensure your discharge home is safe and you or your loved ones have had all of your questions answered regarding your care after you leave the hospital.    Below is a list of resources and contact information should you have any questions regarding your hospital stay, follow-up instructions, or active medical symptoms.    Questions or Concerns Regarding… Contact   Medical Questions Related to Your Discharge  (7 days a week, 8am-5pm) Contact a Nurse Care Coordinator   [phone]   Medical Questions Not Related to Your Discharge  (24 hours a day / 7 days a week)  Contact the Nurse Health Line   [phone]    Medications or Discharge Instructions Refer to your discharge packet   or contact your [facility] Primary Care Provider   [phone]   Follow-up Appointment(s) Schedule your appointment via [employer]   or contact Scheduling [phone]   Billing Review your statement via [employer]  or contact Billing [phone]   Medical Records Review your records via [employer]   or contact Medical Records [phone]     You may receive a telephone call within two days of discharge. This call is to make certain you understand your discharge instructions and have the opportunity to have any questions answered. You can also easily access your medical information, test results and upcoming appointments via the [employer] free online health management tool. You can learn more and sign up at [employer]. For assistance setting up your [employer] account, please call [phone].    Once again, we want to ensure your discharge home is safe and that you have a clear understanding of any next steps in your care. If you have any questions or concerns, please do not hesitate to contact us, we are here for you. Thank you for choosing [facility] for your healthcare needs.    Sincerely,    Your [facility] Healthcare Team

## 2017-05-11 NOTE — PROGRESS NOTES
Left basicervical femur fracture from ground level fall.    Plan:  - Admit to medicine  - To OR today for DHS  - NPO  - Consult to follow

## 2017-05-11 NOTE — PROGRESS NOTES
Pt arrived to unit at 1557 via gurny.  VSS.  Assessment complete. No signs of distress noted at this time. Pt oriented to unit routine, call light/phone system and RN extension number provided, will reinforce.  Pt denies any additional needs at this time.  Call light within reach. Will continue to monitor.

## 2017-05-11 NOTE — IP AVS SNAPSHOT
[employer] Access Code: Activation code not generated  Current [employer] Status: Patient Declined    Your email address is not on file at [facility].  Email Addresses are required for you to sign up for [employer], please contact [phone] to verify your personal information and to provide your email address prior to attempting to register for [employer].    Santosh Deluna Augusto  [address] [place] [address]    Alter Eco  A secure, online tool to manage your health information     [facility]’s Alter Eco® is a secure, online tool that connects you to your personalized health information from the privacy of your home -- day or night - making it very easy for you to manage your healthcare. Once the activation process is completed, you can even access your medical information using the Alter Eco kamari, which is available for free in the Apple Kamari store or Google Play store.     To learn more about Alter Eco, visit [email or web address]    There are two levels of access available (as shown below):   My Chart Features  [facility] Primary Care Doctor [facility]  Specialists [facility]  Urgent  Care Non-[facility] Primary Care Doctor   Email your healthcare team securely and privately 24/7 X X X    Manage appointments: schedule your next appointment; view details of past/upcoming appointments X      Request prescription refills. X      View recent personal medical records, including lab and immunizations X X X X   View health record, including health history, allergies, medications X X X X   Read reports about your outpatient visits, procedures, consult and ER notes X X X X   See your discharge summary, which is a recap of your hospital and/or ER visit that includes your diagnosis, lab results, and care plan X X  X     How to register for [employer]:  Once your e-mail address has been verified, follow the following steps to sign up for [employer].     1. Go to  [email or web address]  2. Click on the Sign Up Now box, which takes you to the New Member  Sign Up page. You will need to provide the following information:  a. Enter your [employer] Access Code exactly as it appears at the top of this page. (You will not need to use this code after you’ve completed the sign-up process. If you do not sign up before the expiration date, you must request a new code.)   b. Enter your date of birth.   c. Enter your home email address.   d. Click Submit, and follow the next screen’s instructions.  3. Create a [employer]One ID. This will be your [employer] login ID and cannot be changed, so think of one that is secure and easy to remember.  4. Create a [employer] password. You can change your password at any time.  5. Enter your Password Reset Question and Answer. This can be used at a later time if you forget your password.   6. Enter your e-mail address. This allows you to receive e-mail notifications when new information is available in [employer].  7. Click Sign Up. You can now view your health information.    For assistance activating your [employer] account, call [phone]

## 2017-05-12 PROBLEM — D50.0 ANEMIA, BLOOD LOSS: Status: ACTIVE | Noted: 2017-05-12

## 2017-05-12 PROBLEM — E87.1 HYPONATREMIA: Status: ACTIVE | Noted: 2017-05-12

## 2017-05-12 PROBLEM — E61.1 IRON DEFICIENCY: Status: ACTIVE | Noted: 2017-05-12

## 2017-05-12 LAB
ANION GAP SERPL CALC-SCNC: 12 MMOL/L (ref 0–11.9)
BUN SERPL-MCNC: 14 MG/DL (ref 8–22)
CALCIUM SERPL-MCNC: 8.8 MG/DL (ref 8.5–10.5)
CHLORIDE SERPL-SCNC: 99 MMOL/L (ref 96–112)
CO2 SERPL-SCNC: 23 MMOL/L (ref 20–33)
CREAT SERPL-MCNC: 1.07 MG/DL (ref 0.5–1.4)
ERYTHROCYTE [DISTWIDTH] IN BLOOD BY AUTOMATED COUNT: 43 FL (ref 35.9–50)
GFR SERPL CREATININE-BSD FRML MDRD: >60 ML/MIN/1.73 M 2
GLUCOSE BLD-MCNC: 157 MG/DL (ref 65–99)
GLUCOSE BLD-MCNC: 164 MG/DL (ref 65–99)
GLUCOSE BLD-MCNC: 200 MG/DL (ref 65–99)
GLUCOSE BLD-MCNC: 91 MG/DL (ref 65–99)
GLUCOSE SERPL-MCNC: 70 MG/DL (ref 65–99)
HCT VFR BLD AUTO: 27.3 % (ref 42–52)
HGB BLD-MCNC: 9.2 G/DL (ref 14–18)
MCH RBC QN AUTO: 31.2 PG (ref 27–33)
MCHC RBC AUTO-ENTMCNC: 33.7 G/DL (ref 33.7–35.3)
MCV RBC AUTO: 92.5 FL (ref 81.4–97.8)
PLATELET # BLD AUTO: 263 K/UL (ref 164–446)
PMV BLD AUTO: 8.6 FL (ref 9–12.9)
POTASSIUM SERPL-SCNC: 3.8 MMOL/L (ref 3.6–5.5)
RBC # BLD AUTO: 2.95 M/UL (ref 4.7–6.1)
SODIUM SERPL-SCNC: 134 MMOL/L (ref 135–145)
WBC # BLD AUTO: 9 K/UL (ref 4.8–10.8)

## 2017-05-12 PROCEDURE — A9270 NON-COVERED ITEM OR SERVICE: HCPCS | Performed by: INTERNAL MEDICINE

## 2017-05-12 PROCEDURE — 51798 US URINE CAPACITY MEASURE: CPT

## 2017-05-12 PROCEDURE — 80048 BASIC METABOLIC PNL TOTAL CA: CPT

## 2017-05-12 PROCEDURE — 700111 HCHG RX REV CODE 636 W/ 250 OVERRIDE (IP): Performed by: NURSE PRACTITIONER

## 2017-05-12 PROCEDURE — G8997 SWALLOW GOAL STATUS: HCPCS | Mod: CI

## 2017-05-12 PROCEDURE — 97166 OT EVAL MOD COMPLEX 45 MIN: CPT

## 2017-05-12 PROCEDURE — 97161 PT EVAL LOW COMPLEX 20 MIN: CPT

## 2017-05-12 PROCEDURE — 36415 COLL VENOUS BLD VENIPUNCTURE: CPT

## 2017-05-12 PROCEDURE — 700102 HCHG RX REV CODE 250 W/ 637 OVERRIDE(OP): Performed by: INTERNAL MEDICINE

## 2017-05-12 PROCEDURE — G8979 MOBILITY GOAL STATUS: HCPCS | Mod: CJ

## 2017-05-12 PROCEDURE — G8978 MOBILITY CURRENT STATUS: HCPCS | Mod: CL

## 2017-05-12 PROCEDURE — 700102 HCHG RX REV CODE 250 W/ 637 OVERRIDE(OP): Performed by: NURSE PRACTITIONER

## 2017-05-12 PROCEDURE — 99232 SBSQ HOSP IP/OBS MODERATE 35: CPT | Performed by: HOSPITALIST

## 2017-05-12 PROCEDURE — 85027 COMPLETE CBC AUTOMATED: CPT

## 2017-05-12 PROCEDURE — G8996 SWALLOW CURRENT STATUS: HCPCS | Mod: CJ

## 2017-05-12 PROCEDURE — 93005 ELECTROCARDIOGRAM TRACING: CPT | Performed by: NURSE PRACTITIONER

## 2017-05-12 PROCEDURE — 93010 ELECTROCARDIOGRAM REPORT: CPT | Performed by: INTERNAL MEDICINE

## 2017-05-12 PROCEDURE — 700111 HCHG RX REV CODE 636 W/ 250 OVERRIDE (IP): Performed by: ORTHOPAEDIC SURGERY

## 2017-05-12 PROCEDURE — G8987 SELF CARE CURRENT STATUS: HCPCS | Mod: CM

## 2017-05-12 PROCEDURE — A9270 NON-COVERED ITEM OR SERVICE: HCPCS | Performed by: NURSE PRACTITIONER

## 2017-05-12 PROCEDURE — 700105 HCHG RX REV CODE 258: Performed by: INTERNAL MEDICINE

## 2017-05-12 PROCEDURE — 92610 EVALUATE SWALLOWING FUNCTION: CPT

## 2017-05-12 PROCEDURE — 700101 HCHG RX REV CODE 250: Performed by: INTERNAL MEDICINE

## 2017-05-12 PROCEDURE — G8988 SELF CARE GOAL STATUS: HCPCS | Mod: CJ

## 2017-05-12 PROCEDURE — 770006 HCHG ROOM/CARE - MED/SURG/GYN SEMI*

## 2017-05-12 PROCEDURE — 82962 GLUCOSE BLOOD TEST: CPT | Mod: 91

## 2017-05-12 RX ADMIN — SODIUM CHLORIDE: 9 INJECTION, SOLUTION INTRAVENOUS at 20:07

## 2017-05-12 RX ADMIN — INSULIN LISPRO 2 UNITS: 100 INJECTION, SOLUTION INTRAVENOUS; SUBCUTANEOUS at 21:38

## 2017-05-12 RX ADMIN — STANDARDIZED SENNA CONCENTRATE AND DOCUSATE SODIUM 2 TABLET: 8.6; 5 TABLET, FILM COATED ORAL at 20:09

## 2017-05-12 RX ADMIN — LABETALOL HYDROCHLORIDE 10 MG: 5 INJECTION INTRAVENOUS at 05:39

## 2017-05-12 RX ADMIN — INSULIN LISPRO 2 UNITS: 100 INJECTION, SOLUTION INTRAVENOUS; SUBCUTANEOUS at 11:12

## 2017-05-12 RX ADMIN — OXYCODONE HYDROCHLORIDE 5 MG: 5 TABLET ORAL at 15:17

## 2017-05-12 RX ADMIN — FENTANYL CITRATE 25 MCG: 50 INJECTION, SOLUTION INTRAMUSCULAR; INTRAVENOUS at 02:15

## 2017-05-12 RX ADMIN — INSULIN LISPRO 2 UNITS: 100 INJECTION, SOLUTION INTRAVENOUS; SUBCUTANEOUS at 18:12

## 2017-05-12 RX ADMIN — SODIUM CHLORIDE: 9 INJECTION, SOLUTION INTRAVENOUS at 09:44

## 2017-05-12 RX ADMIN — HALOPERIDOL LACTATE 1 MG: 5 INJECTION, SOLUTION INTRAMUSCULAR at 03:35

## 2017-05-12 RX ADMIN — ACETAMINOPHEN 650 MG: 325 TABLET, FILM COATED ORAL at 15:17

## 2017-05-12 RX ADMIN — STANDARDIZED SENNA CONCENTRATE AND DOCUSATE SODIUM 2 TABLET: 8.6; 5 TABLET, FILM COATED ORAL at 10:06

## 2017-05-12 RX ADMIN — ENOXAPARIN SODIUM 40 MG: 100 INJECTION SUBCUTANEOUS at 10:05

## 2017-05-12 RX ADMIN — ACETAMINOPHEN 650 MG: 325 TABLET, FILM COATED ORAL at 20:09

## 2017-05-12 RX ADMIN — FERROUS SULFATE TAB 325 MG (65 MG ELEMENTAL FE) 325 MG: 325 (65 FE) TAB at 10:06

## 2017-05-12 ASSESSMENT — LIFESTYLE VARIABLES: DO YOU DRINK ALCOHOL: NO

## 2017-05-12 ASSESSMENT — ACTIVITIES OF DAILY LIVING (ADL): TOILETING: INDEPENDENT

## 2017-05-12 ASSESSMENT — GAIT ASSESSMENTS: GAIT LEVEL OF ASSIST: UNABLE TO PARTICIPATE

## 2017-05-12 ASSESSMENT — PAIN SCALES - GENERAL
PAINLEVEL_OUTOF10: ASSUMED PAIN PRESENT
PAINLEVEL_OUTOF10: 6

## 2017-05-12 ASSESSMENT — PAIN SCALES - WONG BAKER: WONGBAKER_NUMERICALRESPONSE: DOESN'T HURT AT ALL

## 2017-05-12 NOTE — DISCHARGE PLANNING
TCN left VM for patient grand-daughter Caitlin to discuss patient PLOF and DC plan. Awaiting call back. Anticipate patient will likely require skilled services prior to returning home.

## 2017-05-12 NOTE — PROGRESS NOTES
2 RN skin assessment complete with Broderick CHAVEZ.  Skin intact except surgical incision to left hip and redness to back that blanches.  Bilateral soft wrist restraints in place.

## 2017-05-12 NOTE — PROGRESS NOTES
Pt restless and agitated. Pulled off surgical dressing.  Dressing changed.   IV haldol given for agitation  IV fentyl given for pain.  Order for bladder scan placed. Bladder distended.  Restraints in place  Will continue to monitor.

## 2017-05-12 NOTE — THERAPY
"  Speech Language Therapy Clinical Swallow Evaluation completed.  Functional Status: Pt awake and alert but severely confused. Functional swallow for modified diet. Recommend starting dysphagia 2/ntl diet with 1:1 spv. Hold with any difficulty.     Recommendations - Diet: Dysphagia II, Nectar Thick Liquid                          Strategies: Strict 1:1 feeding , Direct supervision during meals and Head of Bed at 90 Degrees                          Medication Administration: Whole with Liquid Wash    Plan of Care: Will benefit from Speech Therapy 3 times per week  Post-Acute Therapy: Discharge to a transitional care facility for continued skilled therapy services.    See \"Rehab Therapy-Acute\" Patient Summary Report for complete documentation.   "

## 2017-05-12 NOTE — PROGRESS NOTES
[facility] hospitalist david maciel  Pt agitated. Pulling at lines and dressing.  Spitting at staff. Unable to reorient.  Pt's baseline is dementia, oriented to self only per report  No prn orders for agitation or pain.  Pt transported to room from pacu with 2 transporters holding his hands to keep him from pulling at lines     hosptialist updated on patient. Orders for bilateral soft wrist restraints ordered.  Will look at prns   Will continue to monitor  Restraints intiated at this time

## 2017-05-12 NOTE — DIETARY
Nutrition note:  Pt with BMI <19 (18.98); however, no report of poor po or wt loss PTA.  Pt with difficulty chewing per admit screen.  Pt started on po diet today (dysphagia 2 with thick liquids).  Skin intact. Labs and meds reviewed. RD will monitor per dept policy.

## 2017-05-12 NOTE — OP REPORT
DATE OF OPERATION: 5/11/2017     PREOPERATIVE DIAGNOSIS: left basicervical femoral neck fracture    POSTOPERATIVE DIAGNOSIS: Same    PROCEDURE PERFORMED: Surgical treatment of left basicervical femoral neck fracture with dynamic hip screw and sideplate    SURGEON: Juan Daniel Pantoja M.D.     ANESTHESIOLOGIST: Ubaldo Chung MD.    ANESTHESIA: General    ASA CLASSIFICATION: III    INDICATIONS: The patient is a 83 y.o. male with a left basicervical femoral neck fracture resulting from a ground level fall.  The patient denies antecedent pain, and was found to have a normal neurovascular exam and skin envelope.  Radiographs demonstrated the basicervical femoral neck fracture.  Given these findings, the patient is an appropriately indicated candidate for surgical treatment of the basicervical femoral neck fracture.  I discussed the risks and benefits of the procedure, including the risks of infection, wound healing complication, neurovascular injury, persistent hip pain, malunion, non-union, need for revision surgery, avascular necrosis, and the medical risks of anesthesia including MI, stroke, and death.  Benefits include early mobilization, improved chance of union, and reduction in the medical risks of hip fractures.  Alternatives to surgery were also discussed, including non-operative management, which I did not recommend.  The patient was in agreement with the plan to proceed, and the informed consent was signed and documented.  I met with the patient pre-operatively and marked the operative extremity with their agreement.  We proceeded to the operating room.     WOUND CLASSIFICATION: Class I    SPECIMEN: None    ESTIMATED BLOOD LOSS: 50 mL    PROCEDURE:  The patient underwent anesthesia, and was positioned supine on the fracture table with all bony prominences were well padded.  Sequential compression devices were employed.  Preoperative imaging was obtained, demonstrated good reduction of the fracture using the table.  The correct operative site was prepped and draped into a sterile field, and the surgical team scrubbed in.  A procedural pause was conducted to verify correct patient, correct extremity, presence of the surgeons initials on the operative extremity, and administration of IV antibiotics, in this case, Ancef.    The starting guide pin for the OIC DHS was laid over the proximal femur, and fluoroscopy was used to localize a lateral thigh incision.  We then dissected down to the lateral femur, incising the fascia with cautery, and elevated the vastus lateralis off the posterior septum to expose the lateral femur.     Then, using the 135 degree guide, we advanced the guide pin for the DHS lag screw to a center-center position in the femoral neck and head, confirmed by AP and lateral fluoroscopy.  We measured for our lag screw, and selected a 90 mm screw.  We then reamed for our screw using the triple reamer.  We then placed the lag screw by hand.  When that was complete, we advanced the 2-hole 135 degree DHS long barrel side plate over the lag screw, and impacted it into place.  We then drilled for, and placed, two 4.5 mm cortical non-locking screws, each achieving excellent bicortical purchase.  We then confirmed our reduction and hardware position on AP and lateral fluoroscopy, and were quite satisfied.    We then irrigated all wounds with normal saline, and closed in layers, with#1 Vicryl in the fascia, 2-0 Vicryl in the subcutaneous tissue, and staples in the skin.  Sterile dressings were applied.       The patient tolerated the procedure well. There were no apparent complications. All sponge, needle, and instrument counts were correct on two separate occasions. He was awakened, extubated, and transferred to the recovery room in satisfactory condition.     Post-Operative Plan:    1.  The patient should bear weight as tolerated on their operative extremity.  Gait aids (crutch or crutches, cane, walker) may be used as  needed, and may be discontinued when no longer required.  2.  IV antibiotics - may be continued for 24 hours, but are not required.  3.  DVT prophylaxis - SCD's and Lovenox 40 mg SQ daily while inpatient.  The patient may transition to Aspirin 325 mg PO BID as an outpatient  4.  Discharge planning  ____________________________________   Juan Daniel Pantoja M.D.   DD: 5/11/2017  8:00 PM    ADDENDUM 6/25/2017:    ASSISTANT: Dr. Jerrell Su M.D.    The use of Dr. Jerrell Su as a surgical assistant was necessary for assistance with exposure, retraction, fracture reduction, instrumentation, and closure.

## 2017-05-12 NOTE — PROGRESS NOTES
Hospital Medicine Progress Note, Adult, Complex               Author: Josephkaren Garcia Date & Time created: 5/12/2017  2:56 PM     Interval History:  Patient seen and examined today.      All Data, Medication data reviewed.  Case discussed with nursing as available.  Plan of Care reviewed with patient and notified of changes.  84 y/o M with h/o reported dementia and admitted with fall and hip Fx, s/p DHS with Dr. Pantoja  5/12 Pt is pale and confused, looks chr. Ill      Review of Systems:  Review of Systems   Unable to perform ROS: dementia       Physical Exam:  Physical Exam   Constitutional: He is oriented to person, place, and time. He appears well-developed and well-nourished. He appears lethargic. He has a sickly appearance. Nasal cannula in place.   HENT:   Head: Normocephalic.   edentulous   Eyes: Pupils are equal, round, and reactive to light.   Neck: Normal range of motion.   Cardiovascular: Normal rate, regular rhythm and normal heart sounds.    Pulmonary/Chest: Effort normal.   Abdominal: Soft. Bowel sounds are normal.   Genitourinary: Rectum normal and penis normal.   Musculoskeletal: Normal range of motion.   S/p L hip Fx repair  Dressing in place   Neurological: He is oriented to person, place, and time. He appears lethargic.   Skin: Skin is warm. There is pallor.   Psychiatric: His mood appears anxious. His speech is slurred. He is slowed. Cognition and memory are impaired. He expresses inappropriate judgment.   Nursing note and vitals reviewed.      Labs:        Invalid input(s): [id number]  Recent Labs      05/11/17   1208   TROPONINI  <0.01   BNPBTYPENAT  42     Recent Labs      05/11/17   1208  05/12/17   0307   SODIUM  129*  134*   POTASSIUM  4.0  3.8   CHLORIDE  95*  99   CO2  23  23   BUN  14  14   CREATININE  1.14  1.07   CALCIUM  9.0  8.8     Recent Labs      05/11/17   1208  05/12/17   0307   GLUCOSE  202*  70     Recent Labs      05/11/17   1208  05/12/17   0307   RBC  3.16*  2.95*    HEMOGLOBIN  9.6*  9.2*   HEMATOCRIT  29.3*  27.3*   PLATELETCT  236  263   PROTHROMBTM  12.7   --    APTT  25.9   --    INR  0.93   --      Recent Labs      17   1208  17   0307   WBC  5.9  9.0   NEUTSPOLYS  83.50*   --    LYMPHOCYTES  4.70*   --    MONOCYTES  11.00   --    EOSINOPHILS  0.00   --    BASOPHILS  0.30   --            Hemodynamics:  Temp (24hrs), Av.6 °C (97.9 °F), Min:35.9 °C (96.7 °F), Max:37.1 °C (98.7 °F)  Temperature: 36.7 °C (98 °F)  Pulse  Av.8  Min: 82  Max: 130Heart Rate (Monitored): 90  Blood Pressure : 130/68 mmHg, NIBP: 160/86 mmHg     Respiratory:    Respiration: 18, Pulse Oximetry: 98 %           Fluids:    Intake/Output Summary (Last 24 hours) at 17 1456  Last data filed at 17 0355   Gross per 24 hour   Intake   1250 ml   Output   1050 ml   Net    200 ml        GI/Nutrition:  Orders Placed This Encounter   Procedures   • DIET ORDER     Standing Status: Standing      Number of Occurrences: 1      Standing Expiration Date:      Order Specific Question:  Diet:     Answer:  Regular [1]     Order Specific Question:  Texture/Fiber modifications:     Answer:  Dysphagia 1(Pureed)specify fluid consistency(question 6) [1]      Comments:  per SLP, ok for dysphagia 2 diet but pt prefers dysphagia 1     Order Specific Question:  Consistency/Fluid modifications:     Answer:  Nectar Thick [2]     Medical Decision Making, by Problem:  Active Hospital Problems    Diagnosis   • Hypertension [I10] h/o monitor   • Hypothyroidism [E03.9] c/w    • Depression [F32.9] reported   • Anemia, blood loss [D50.0] will monitor   • Iron deficiency [E61.1] Fe suppl.   • Hyponatremia [E87.1] follow   • Femur fracture, left (CMS-HCC) [S72.92XA] s/p fall and repair with DHS   • Controlled type 2 diabetes mellitus without complication, without long-term current use of insulin (CMS-HCC) [E11.9]   • Squamous cell cancer of tongue (CMS-HCC) [C02.9] per report   • GERD (gastroesophageal reflux  disease) [K21.9] h/o   • Anxiety [F41.9] h/o   - Reported Dementia, advanced age  Plan  C/w conservative post-op care  FE suppl  Ptr  Ot  dispo to be evaluated  See orders    Radiology images reviewed, Labs reviewed and Medications reviewed  Mathis catheter: No Mathis      DVT Prophylaxis: Enoxaparin (Lovenox)  DVT prophylaxis - mechanical: SCDs  Ulcer prophylaxis: Yes    Assessed for rehab: Patient was assess for and/or received rehabilitation services during this hospitalization

## 2017-05-12 NOTE — CONSULTS
5/11/2017    Santosh Casas is a 83 y.o. male who presents with a displaced left basicervical femoral neck fracture due to ground level fall in her home yesterday.  The patient noted immediate pain and inability to move the affected extremity due to pain.  Orthopedics was consulted. Patient denies numbness, paresthesias, loss of consciousness or other symptoms.  He is quite hard of hearing, and has some dementia, so some of the history was obtained from his wife, who accompanies him.  He lives independently with his wife, uses a walker for ambulation, and is mostly a household ambulator.    Past Medical History   Diagnosis Date   • Psychiatric disorder      depression. insomnia   • Disease related peripheral neuropathy (CMS-HCC)    • Diabetes    • Thyroid disease    • Arthritis    • Unspecified urinary incontinence    • Dental disorder    • Urinary bladder disorder    • Bronchitis    • Coughing blood    • Sleep apnea    • Pain    • Other specified disorder of intestines      occassional constipation   • CATARACT      IOL right eye   • Constipation 8/28/2013       Past Surgical History   Procedure Laterality Date   • Cholecystectomy     • Laryngoscopy  5/18/2012     Performed by AISHA MCMILLAN at SURGERY SAME DAY [facility] ORS   • Esophagoscopy  5/18/2012     Performed by AISHA MCMILLAN at SURGERY SAME DAY [facility] ORS       Medications  No current facility-administered medications on file prior to encounter.     Current Outpatient Prescriptions on File Prior to Encounter   Medication Sig Dispense Refill   • duloxetine (CYMBALTA) 60 MG Cap DR Particles delayed-release capsule Take 1 Cap by mouth every day. 90 Cap 3   • quetiapine (SEROQUEL) 200 MG Tab Take 1 Tab by mouth every evening. 90 Tab 3   • oxycodone immediate release (ROXICODONE) 10 MG immediate release tablet Take 1 Tab by mouth every four hours as needed for Severe Pain. 60 Tab 0   • psyllium (METAMUCIL) 58.12 % Pack Take 1 Packet by mouth every day.      • famotidine (PEPCID) 20 MG Tab Take 1 Tab by mouth 2 times a day. 60 Tab 3   • docusate sodium (COLACE) 100 MG Cap Take 1 Cap by mouth 2 times a day. 180 Cap 3   • ferrous sulfate 325 (65 FE) MG tablet Take 1 Tab by mouth every day. With 250 mg vit C and a stool softner such as colace 30 Tab 3   • levothyroxine (SYNTHROID) 50 MCG Tab Take 1 Tab by mouth Every morning on an empty stomach. 90 Tab 3       Allergies  Nkda    ROS  Per HPI. All other systems were reviewed and found to be negative    Family History   Problem Relation Age of Onset   • Heart Disease Father    • Diabetes Neg Hx    • Stroke Neg Hx    • Cancer Neg Hx      stomach       Social History     Social History   • Marital Status:      Spouse Name: N/A   • Number of Children: N/A   • Years of Education: N/A     Social History Main Topics   • Smoking status: Former Smoker -- 1.00 packs/day for 10 years     Types: Cigarettes     Quit date: 01/01/1965   • Smokeless tobacco: Never Used   • Alcohol Use: No   • Drug Use: No   • Sexual Activity: Not Asked      Comment:      Other Topics Concern   • None     Social History Narrative       Physical Exam  Vitals  Blood pressure 170/94, pulse 123, temperature 35.9 °C (96.7 °F), resp. rate 20, height 1.829 m (6'), weight 63.504 kg (140 lb), SpO2 97 %.  General: Well Developed, Well Nourished, no acute distress  Psychiatric: Alert and oriented x3, appropriate responses to questions, pleasant mood and affect.  HEENT: Normocephalic, atraumatic  Eyes: Anicteric, PERRLA, EOMI  Neck: Supple, nontender, no masses  Chest: Symmetric expansion of the chest wall, non-tender to palpation, no distress.  Heart: RRR, palpable peripheral pulses  Abdomen: Soft, NT, ND  Skin: Intact, no open wounds  Extremities: Left leg pain with movement  Neuro: Intact light touch sensation in S/S/SP/Dp/T on left, fires TA/GS/EHL/P  Vascular: 2+ DP on left, Capillary refill <2 seconds    Radiographs:  DX-FEMUR-2+ LEFT   Final  Result      Left proximal femoral fracture which involves the base of the neck and extends obliquely with some impaction.      DX-CHEST-LIMITED (1 VIEW)   Final Result      No evidence of acute cardiopulmonary process.      DX-PELVIS-1 OR 2 VIEWS   Final Result      Left proximal femoral fracture which involves the base of the femoral neck and is mildly displaced.      DX-PORTABLE FLUOROSCOPY < 1 HOUR    (Results Pending)   DX-HIP-COMPLETE - UNILATERAL 2+ LEFT    (Results Pending)       Laboratory Values  Recent Labs      05/11/17   1208   WBC  5.9   RBC  3.16*   HEMOGLOBIN  9.6*   HEMATOCRIT  29.3*   MCV  92.7   MCH  30.4   MCHC  32.8*   RDW  43.4   PLATELETCT  236   MPV  8.6*     Recent Labs      05/11/17   1208   SODIUM  129*   POTASSIUM  4.0   CHLORIDE  95*   CO2  23   GLUCOSE  202*   BUN  14     Recent Labs      05/11/17   1208   APTT  25.9   INR  0.93         Impression:    #1 Left basicervical femoral neck fracture    Plan:    Operative treatment of his femoral neck fracture by dynamic hip screw. Risks and benefits of surgery were discussed which include, but are not limited to bleeding, infection, neurovascular damage, malunion, nonunion, avascular necrosis or loss of fixation requiring conversion to total hip arthroplasty, DVT, PE, MI, Stroke and death. They understand these risks and wish to proceed.  We also discussed therapeutic alternatives to surgery, including non-operative management, which I did not recommend.    Please keep NPO pending surgery.  Non-weightbearing affected extremity pending surgery.    Please see operative note for detailed post-operative plan, including post-op weightbearing status.

## 2017-05-12 NOTE — DISCHARGE PLANNING
Unable to complete screening due to patient disorientation : Disoriented to Event, Disoriented to Place, Disoriented to Time. Attempted to call pt wife and left a voicemail for a return call.

## 2017-05-12 NOTE — PROGRESS NOTES
Straight cath per protocol at 1230, 650 ml of urine return, will continue to monitor for voiding difficulty.

## 2017-05-12 NOTE — DISCHARGE PLANNING
TCN spoke with patient granddaugher regarding patient PLOF. Per Grand-daughter patient was living at home with his wife. Patient was ambulating with a FWW but wife assisted with all ADLs. TCN informed daughter the care team is recommending SNF. Grand daughter was agreeable to suggestion and plans to speak with patient wife regarding recommendation. TCN left choice at bedside for patient wife and grand daughter to review. SW to follow for choice.

## 2017-05-12 NOTE — THERAPY
"Occupational Therapy Evaluation completed.   Functional Status:  Total assist supine > < EOB, max A sit to stand, total A LB dressing and urinal use   Plan of Care: Will benefit from Occupational Therapy 3 times per week  Discharge Recommendations:  Equipment: Will Continue to Assess for Equipment Needs. Post-acute therapy Discharge to a transitional care facility for continued skilled therapy services.    See \"Rehab Therapy-Acute\" Patient Summary Report for complete documentation.    83 y.o. male with baseline dementia, frequent falls who had GLF resulting in L femur fx. Required ORIF, now WBAT. Wife present at end of eval. Reports pt can complete basic ADL with supv at baseline (supv for standing bathing). Reports confusion is exacerbated at this time. (Post-anesthesia and unfamiliar setting likely factors.) Pt demos poor command following, impaired motor planning. Max A - dep for all ADL and mobility at this time. Pt would benefit from acute OT and post-acute services to maximize functional independence.     "

## 2017-05-12 NOTE — DIETARY
Nutrition note:  Clarified diet order with RN.  Per SLP, pt can have dysphagia 2 diet with thick liquids.  However, pt has dementia and wife states that pt tolerates pureed food better.  Therefore, pt will be on a dysphagia 1 diet with thick liquids.  Fixed diet order so it would communicate with kitchen computer system.

## 2017-05-12 NOTE — CARE PLAN
Problem: Safety  Goal: Will remain free from injury  Bed alarm on, wife and granddaughter at bedside instructed to use the call light when needing assistance.     Problem: Venous Thromboembolism (VTW)/Deep Vein Thrombosis (DVT) Prevention:  Goal: Patient will participate in Venous Thrombosis (VTE)/Deep Vein Thrombosis (DVT)Prevention Measures  Unfractionated heparin after procedure    Problem: Knowledge Deficit  Goal: Knowledge of disease process/condition, treatment plan, diagnostic tests, and medications will improve  Pt confused, POC provided to wife and granddaughter.     Problem: Urinary Elimination:  Goal: Ability to reestablish a normal urinary elimination pattern will improve  Pt has urinary incontinence, condom cath placed.     Problem: Pain Management  Goal: Pain level will decrease to patient’s comfort goal  Assumed pain present and provided the pt with 2mg IV morphine for pain relief. Pt unable to answer questions regarding pain, however pt restless and moans it hurts.

## 2017-05-12 NOTE — THERAPY
"82 y/o male GLF unk mechanism sustained left basicervical femur fx S/P DHS, WBAT LLE . PMH icl dementia, DM2, GERD, hypothyroidism, squamous cell ca tongue, PN, arthritis and cataracts. Pt confused, follows 1 step commands. Max assist for bed mobility and STS, with psoterior lean. Acute PT to address bed mobility, transfers and ambulation to facilitate a higher level of function.    Physical Therapy Evaluation completed.   Bed Mobility:  Supine to Sit: Maximal Assist  Transfers: Sit to Stand: Maximal Assist  Gait: Level Of Assist: Unable to Participate with Front-Wheel Walker       Plan of Care: Will benefit from Physical Therapy 3 times per week  Discharge Recommendations: Equipment: Will Continue to Assess for Equipment Needs. Post-acute therapy Discharge to a transitional care facility for continued skilled therapy services.    See \"Rehab Therapy-Acute\" Patient Summary Report for complete documentation.     "

## 2017-05-13 LAB
ALBUMIN SERPL BCP-MCNC: 2.9 G/DL (ref 3.2–4.9)
ALBUMIN/GLOB SERPL: 1 G/DL
ALP SERPL-CCNC: 96 U/L (ref 30–99)
ALT SERPL-CCNC: 12 U/L (ref 2–50)
ANION GAP SERPL CALC-SCNC: 5 MMOL/L (ref 0–11.9)
AST SERPL-CCNC: 31 U/L (ref 12–45)
BASOPHILS # BLD AUTO: 0.3 % (ref 0–1.8)
BASOPHILS # BLD: 0.02 K/UL (ref 0–0.12)
BILIRUB SERPL-MCNC: 0.4 MG/DL (ref 0.1–1.5)
BUN SERPL-MCNC: 20 MG/DL (ref 8–22)
CALCIUM SERPL-MCNC: 8.2 MG/DL (ref 8.5–10.5)
CHLORIDE SERPL-SCNC: 102 MMOL/L (ref 96–112)
CO2 SERPL-SCNC: 26 MMOL/L (ref 20–33)
CREAT SERPL-MCNC: 0.98 MG/DL (ref 0.5–1.4)
EKG IMPRESSION: NORMAL
EOSINOPHIL # BLD AUTO: 0.01 K/UL (ref 0–0.51)
EOSINOPHIL NFR BLD: 0.2 % (ref 0–6.9)
ERYTHROCYTE [DISTWIDTH] IN BLOOD BY AUTOMATED COUNT: 42.1 FL (ref 35.9–50)
GFR SERPL CREATININE-BSD FRML MDRD: >60 ML/MIN/1.73 M 2
GLOBULIN SER CALC-MCNC: 2.8 G/DL (ref 1.9–3.5)
GLUCOSE BLD-MCNC: 160 MG/DL (ref 65–99)
GLUCOSE BLD-MCNC: 161 MG/DL (ref 65–99)
GLUCOSE BLD-MCNC: 161 MG/DL (ref 65–99)
GLUCOSE BLD-MCNC: 177 MG/DL (ref 65–99)
GLUCOSE SERPL-MCNC: 188 MG/DL (ref 65–99)
HCT VFR BLD AUTO: 23.1 % (ref 42–52)
HGB BLD-MCNC: 7.7 G/DL (ref 14–18)
IMM GRANULOCYTES # BLD AUTO: 0.05 K/UL (ref 0–0.11)
IMM GRANULOCYTES NFR BLD AUTO: 0.8 % (ref 0–0.9)
LYMPHOCYTES # BLD AUTO: 0.63 K/UL (ref 1–4.8)
LYMPHOCYTES NFR BLD: 9.5 % (ref 22–41)
MAGNESIUM SERPL-MCNC: 1.6 MG/DL (ref 1.5–2.5)
MCH RBC QN AUTO: 30.6 PG (ref 27–33)
MCHC RBC AUTO-ENTMCNC: 33.3 G/DL (ref 33.7–35.3)
MCV RBC AUTO: 91.7 FL (ref 81.4–97.8)
MONOCYTES # BLD AUTO: 0.94 K/UL (ref 0–0.85)
MONOCYTES NFR BLD AUTO: 14.2 % (ref 0–13.4)
NEUTROPHILS # BLD AUTO: 4.98 K/UL (ref 1.82–7.42)
NEUTROPHILS NFR BLD: 75 % (ref 44–72)
NRBC # BLD AUTO: 0 K/UL
NRBC BLD AUTO-RTO: 0 /100 WBC
PHOSPHATE SERPL-MCNC: 1.6 MG/DL (ref 2.5–4.5)
PLATELET # BLD AUTO: 202 K/UL (ref 164–446)
PMV BLD AUTO: 9 FL (ref 9–12.9)
POTASSIUM SERPL-SCNC: 3.6 MMOL/L (ref 3.6–5.5)
PROT SERPL-MCNC: 5.7 G/DL (ref 6–8.2)
RBC # BLD AUTO: 2.52 M/UL (ref 4.7–6.1)
SODIUM SERPL-SCNC: 133 MMOL/L (ref 135–145)
WBC # BLD AUTO: 6.6 K/UL (ref 4.8–10.8)

## 2017-05-13 PROCEDURE — 99232 SBSQ HOSP IP/OBS MODERATE 35: CPT | Performed by: HOSPITALIST

## 2017-05-13 PROCEDURE — 80053 COMPREHEN METABOLIC PANEL: CPT

## 2017-05-13 PROCEDURE — 84100 ASSAY OF PHOSPHORUS: CPT

## 2017-05-13 PROCEDURE — 700102 HCHG RX REV CODE 250 W/ 637 OVERRIDE(OP): Performed by: INTERNAL MEDICINE

## 2017-05-13 PROCEDURE — A9270 NON-COVERED ITEM OR SERVICE: HCPCS | Performed by: HOSPITALIST

## 2017-05-13 PROCEDURE — 36415 COLL VENOUS BLD VENIPUNCTURE: CPT

## 2017-05-13 PROCEDURE — 770006 HCHG ROOM/CARE - MED/SURG/GYN SEMI*

## 2017-05-13 PROCEDURE — A9270 NON-COVERED ITEM OR SERVICE: HCPCS | Performed by: INTERNAL MEDICINE

## 2017-05-13 PROCEDURE — 700105 HCHG RX REV CODE 258: Performed by: INTERNAL MEDICINE

## 2017-05-13 PROCEDURE — 700102 HCHG RX REV CODE 250 W/ 637 OVERRIDE(OP): Performed by: NURSE PRACTITIONER

## 2017-05-13 PROCEDURE — 700102 HCHG RX REV CODE 250 W/ 637 OVERRIDE(OP): Performed by: HOSPITALIST

## 2017-05-13 PROCEDURE — A9270 NON-COVERED ITEM OR SERVICE: HCPCS | Performed by: NURSE PRACTITIONER

## 2017-05-13 PROCEDURE — 85025 COMPLETE CBC W/AUTO DIFF WBC: CPT

## 2017-05-13 PROCEDURE — 82962 GLUCOSE BLOOD TEST: CPT | Mod: 91

## 2017-05-13 PROCEDURE — 83735 ASSAY OF MAGNESIUM: CPT

## 2017-05-13 RX ORDER — TAMSULOSIN HYDROCHLORIDE 0.4 MG/1
0.4 CAPSULE ORAL
Status: DISCONTINUED | OUTPATIENT
Start: 2017-05-13 | End: 2017-05-23 | Stop reason: HOSPADM

## 2017-05-13 RX ADMIN — STANDARDIZED SENNA CONCENTRATE AND DOCUSATE SODIUM 2 TABLET: 8.6; 5 TABLET, FILM COATED ORAL at 08:29

## 2017-05-13 RX ADMIN — ACETAMINOPHEN 650 MG: 325 TABLET, FILM COATED ORAL at 14:03

## 2017-05-13 RX ADMIN — SODIUM CHLORIDE: 9 INJECTION, SOLUTION INTRAVENOUS at 06:21

## 2017-05-13 RX ADMIN — ACETAMINOPHEN 650 MG: 325 TABLET, FILM COATED ORAL at 21:08

## 2017-05-13 RX ADMIN — FERROUS SULFATE TAB 325 MG (65 MG ELEMENTAL FE) 325 MG: 325 (65 FE) TAB at 08:29

## 2017-05-13 RX ADMIN — INSULIN LISPRO 2 UNITS: 100 INJECTION, SOLUTION INTRAVENOUS; SUBCUTANEOUS at 21:14

## 2017-05-13 RX ADMIN — OXYCODONE HYDROCHLORIDE 5 MG: 5 TABLET ORAL at 01:56

## 2017-05-13 RX ADMIN — INSULIN LISPRO 2 UNITS: 100 INJECTION, SOLUTION INTRAVENOUS; SUBCUTANEOUS at 10:56

## 2017-05-13 RX ADMIN — TAMSULOSIN HYDROCHLORIDE 0.4 MG: 0.4 CAPSULE ORAL at 14:03

## 2017-05-13 RX ADMIN — INSULIN LISPRO 2 UNITS: 100 INJECTION, SOLUTION INTRAVENOUS; SUBCUTANEOUS at 06:24

## 2017-05-13 RX ADMIN — STANDARDIZED SENNA CONCENTRATE AND DOCUSATE SODIUM 2 TABLET: 8.6; 5 TABLET, FILM COATED ORAL at 21:08

## 2017-05-13 RX ADMIN — INSULIN LISPRO 2 UNITS: 100 INJECTION, SOLUTION INTRAVENOUS; SUBCUTANEOUS at 16:42

## 2017-05-13 RX ADMIN — ACETAMINOPHEN 650 MG: 325 TABLET, FILM COATED ORAL at 06:16

## 2017-05-13 ASSESSMENT — PAIN SCALES - WONG BAKER: WONGBAKER_NUMERICALRESPONSE: HURTS EVEN MORE

## 2017-05-13 ASSESSMENT — PAIN SCALES - GENERAL
PAINLEVEL_OUTOF10: 0

## 2017-05-13 ASSESSMENT — LIFESTYLE VARIABLES: DO YOU DRINK ALCOHOL: NO

## 2017-05-13 NOTE — PROGRESS NOTES
Bedside report received from Anita RN with [facility] resident, pt a/a with no s/s of discomfort or distress noted, oxygen by NC, IV infusing as ordered, dressing to lt femur intact, quinteros intact per orders for retention, full fall precautions in place including bed alarm, pt is in bilateral soft restraints due to pulling lines, new orders will be needed today.

## 2017-05-13 NOTE — CARE PLAN
Problem: Fluid Volume:  Goal: Will maintain balanced intake and output  Patient had 240 ml at 2000.  Patient given about 200 at 2200.  Patient receiving Normal Saline at 100 cc / hr.  Will bladder scan patient if he does not urinate.

## 2017-05-13 NOTE — PROGRESS NOTES
Patient bladder scanned for 145 at 1900.  Called Anu Rebolledo to see if fluids need increased for patient.  Patient straight cathed at 1230 for 650.  She stated fluids at 100 ml/hr, which is current order is fine.

## 2017-05-13 NOTE — PROGRESS NOTES
Hospital Medicine Progress Note, Adult, Complex               Author: Josephkaren Garcia Date & Time created: 5/13/2017  10:48 AM     Interval History:  Patient seen and examined today.      All Data, Medication data reviewed.  Case discussed with nursing as available.  Plan of Care reviewed with patient and notified of changes.  82 y/o M with h/o reported dementia and admitted with fall and hip Fx, s/p DHS with Dr. Pantoja  5/12 Pt is pale and confused, looks chr. Ill  5/13 Pt remains confused and lethargic, AxOx2, wife at beside, gives addl. Info, eating soft diet with help, H/H lower, no overt bleeding, incision's ok      Review of Systems:  Review of Systems   Unable to perform ROS: dementia       Physical Exam:  Physical Exam   Constitutional: He is oriented to person, place, and time. He appears well-developed and well-nourished. He appears lethargic. He has a sickly appearance. Nasal cannula in place.   HENT:   Head: Normocephalic.   edentulous   Eyes: Pupils are equal, round, and reactive to light.   Neck: Normal range of motion.   Cardiovascular: Normal rate, regular rhythm and normal heart sounds.    Pulmonary/Chest: Effort normal.   Abdominal: Soft. Bowel sounds are normal.   Genitourinary: Rectum normal and penis normal.   Musculoskeletal: Normal range of motion.   S/p L hip Fx repair  Dressing in place   Neurological: He is oriented to person, place, and time. He appears lethargic.   Skin: Skin is warm. There is pallor.   Psychiatric: His mood appears anxious. His speech is slurred. He is slowed. Cognition and memory are impaired. He expresses inappropriate judgment.   Nursing note and vitals reviewed.      Labs:        Invalid input(s): [id number]  Recent Labs      05/11/17   1208   TROPONINI  <0.01   BNPBTYPENAT  42     Recent Labs      05/11/17   1208  05/12/17   0307  05/13/17   0314   SODIUM  129*  134*  133*   POTASSIUM  4.0  3.8  3.6   CHLORIDE  95*  99  102   CO2  23  23  26   BUN  14  14  20    CREATININE  1.14  1.07  0.98   MAGNESIUM   --    --   1.6   PHOSPHORUS   --    --   1.6*   CALCIUM  9.0  8.8  8.2*     Recent Labs      17   ALTSGPT   --    --   12   ASTSGOT   --    --   31   ALKPHOSPHAT   --    --   96   TBILIRUBIN   --    --   0.4   GLUCOSE  202*  70  188*     Recent Labs      17   RBC  3.16*  2.95*  2.52*   HEMOGLOBIN  9.6*  9.2*  7.7*   HEMATOCRIT  29.3*  27.3*  23.1*   PLATELETCT  236  263  202   PROTHROMBTM  12.7   --    --    APTT  25.9   --    --    INR  0.93   --    --      Recent Labs      17   WBC  5.9  9.0  6.6   NEUTSPOLYS  83.50*   --   75.00*   LYMPHOCYTES  4.70*   --   9.50*   MONOCYTES  11.00   --   14.20*   EOSINOPHILS  0.00   --   0.20   BASOPHILS  0.30   --   0.30   ASTSGOT   --    --   31   ALTSGPT   --    --   12   ALKPHOSPHAT   --    --   96   TBILIRUBIN   --    --   0.4           Hemodynamics:  Temp (24hrs), Av.1 °C (98.8 °F), Min:36.7 °C (98 °F), Max:37.4 °C (99.3 °F)  Temperature: 37.3 °C (99.1 °F)  Pulse  Av.5  Min: 82  Max: 130   Blood Pressure : 150/71 mmHg     Respiratory:    Respiration: 16, Pulse Oximetry: 100 %        RUL Breath Sounds: Clear, RML Breath Sounds: Clear, RLL Breath Sounds: Diminished, PIO Breath Sounds: Clear, LLL Breath Sounds: Diminished  Fluids:    Intake/Output Summary (Last 24 hours) at 17 1048  Last data filed at 17 0600   Gross per 24 hour   Intake    660 ml   Output   1445 ml   Net   -785 ml        GI/Nutrition:  Orders Placed This Encounter   Procedures   • DIET ORDER     Standing Status: Standing      Number of Occurrences: 1      Standing Expiration Date:      Order Specific Question:  Diet:     Answer:  Regular [1]     Order Specific Question:  Texture/Fiber modifications:     Answer:  Dysphagia 1(Pureed)specify fluid consistency(question 6) [1]      Comments:  per SLP, ok for  dysphagia 2 diet but pt prefers dysphagia 1     Order Specific Question:  Consistency/Fluid modifications:     Answer:  Nectar Thick [2]     Medical Decision Making, by Problem:  Active Hospital Problems    Diagnosis   • Femur fracture, left (CMS-HCC) [S72.92XA] s/p fall and repair with DHS   • Hypothyroidism [E03.9] c/w    • Depression [F32.9] reported   • Anemia, blood loss [D50.0] will monitor   • Iron deficiency [E61.1] Fe suppl.   • Hyponatremia [E87.1] follow   • Hypertension [I10] h/o monitor   • Controlled type 2 diabetes mellitus without complication, without long-term current use of insulin (CMS-HCC) [E11.9]   • Squamous cell cancer of tongue (CMS-HCC) [C02.9] per report   • GERD (gastroesophageal reflux disease) [K21.9] h/o   • Anxiety [F41.9] h/o   - Reported Dementia, advanced age  - urinary retention  Plan  C/w conservative post-op care  FE suppl and Fe studies  Pt  Ot  dispo to be evaluated, will need SNF  Monitor Anemia  Mobilize  Trial to get quinteros out  See orders    Radiology images reviewed, Labs reviewed and Medications reviewed  Quinteros catheter: No Quinteros      DVT Prophylaxis: Enoxaparin (Lovenox)  DVT prophylaxis - mechanical: SCDs  Ulcer prophylaxis: Yes    Assessed for rehab: Patient was assess for and/or received rehabilitation services during this hospitalization

## 2017-05-13 NOTE — CARE PLAN
Problem: Safety  Goal: Will remain free from injury  Outcome: PROGRESSING AS EXPECTED  Treaded socks in place, bed in the lowest position, bed alarm on, call light and belongings within reach, pt call for assistance appropriately    Problem: Pain Management  Goal: Pain level will decrease to patient’s comfort goal  Outcome: PROGRESSING AS EXPECTED  Pt taking oxy 5 with adequate pain control

## 2017-05-13 NOTE — PROGRESS NOTES
S:  Seen and examined.  POD #1 s/p L hip DHS for basicervical femoral neck fracture.  Doing well this morning.  No new complaints, pain controlled.    O: Blood pressure 132/68, pulse 96, temperature 37.4 °C (99.3 °F), resp. rate 18, height 1.829 m (6'), weight 63.504 kg (140 lb), SpO2 93 %..    Intake/Output Summary (Last 24 hours) at 05/12/17 2322  Last data filed at 05/12/17 2300   Gross per 24 hour   Intake    500 ml   Output   1650 ml   Net  -1150 ml   .    Operative/injured extremity examined.  Compartments soft, distal light touch sensation intact, firing EHL/TA/GS/P.  Toes warm, well-perfused.  Wound dressing c/d/i    Recent Labs      05/11/17   1208  05/12/17   0307   WBC  5.9  9.0   RBC  3.16*  2.95*   HEMOGLOBIN  9.6*  9.2*   HEMATOCRIT  29.3*  27.3*   MCV  92.7  92.5   MCH  30.4  31.2   MCHC  32.8*  33.7   RDW  43.4  43.0   PLATELETCT  236  263   MPV  8.6*  8.6*       A/P:    POD #1 s/p L hip DHS    Antibiotics: None required  Activity: WBAT operative extremity.  PT today.  Diet: General  DVT: Mechanical (SCDs) + Pharmacologic (Lovenox, d/c home on Aspirin)  Dispo: D/C planning

## 2017-05-13 NOTE — PROGRESS NOTES
Report received. Assumed care of pt. A/O x1, disoriented to time, place and event. VSS. Responds appropriately. Denies pain, SOB. Assessment complete, new dressing applied, pt. removed dressing, soft restrains on, 2hr check. Explained importance of calling before getting OOB. Call light and belongings within reach. Bed alarm on. Bed in the lowest position. Treaded socks in place. Hourly rounding in progress. Will continue to monitor .

## 2017-05-13 NOTE — PROGRESS NOTES
Mathis inserted into patient due to urinary retention.  Third straight cath was needed after two prior earlier in the day.  Patient tolerated procedure well.

## 2017-05-13 NOTE — CARE PLAN
Problem: Skin Integrity  Goal: Risk for impaired skin integrity will decrease  Intervention: Assess risk factors for impaired skin integrity and/or pressure ulcers  Patient repositioned.  Patients restraints removed one at a time and arm exercises done.  Patients elbows and heels elevated off of mattress.  Lotion applied.

## 2017-05-14 LAB
ALBUMIN SERPL BCP-MCNC: 3 G/DL (ref 3.2–4.9)
ALBUMIN/GLOB SERPL: 1 G/DL
ALP SERPL-CCNC: 84 U/L (ref 30–99)
ALT SERPL-CCNC: 13 U/L (ref 2–50)
ANION GAP SERPL CALC-SCNC: 4 MMOL/L (ref 0–11.9)
AST SERPL-CCNC: 29 U/L (ref 12–45)
BASOPHILS # BLD AUTO: 0.1 % (ref 0–1.8)
BASOPHILS # BLD: 0.01 K/UL (ref 0–0.12)
BILIRUB SERPL-MCNC: 0.5 MG/DL (ref 0.1–1.5)
BUN SERPL-MCNC: 14 MG/DL (ref 8–22)
CALCIUM SERPL-MCNC: 8.7 MG/DL (ref 8.5–10.5)
CHLORIDE SERPL-SCNC: 99 MMOL/L (ref 96–112)
CO2 SERPL-SCNC: 28 MMOL/L (ref 20–33)
CREAT SERPL-MCNC: 0.81 MG/DL (ref 0.5–1.4)
EOSINOPHIL # BLD AUTO: 0.03 K/UL (ref 0–0.51)
EOSINOPHIL NFR BLD: 0.4 % (ref 0–6.9)
ERYTHROCYTE [DISTWIDTH] IN BLOOD BY AUTOMATED COUNT: 41.2 FL (ref 35.9–50)
FERRITIN SERPL-MCNC: 122.5 NG/ML (ref 22–322)
GFR SERPL CREATININE-BSD FRML MDRD: >60 ML/MIN/1.73 M 2
GLOBULIN SER CALC-MCNC: 3.1 G/DL (ref 1.9–3.5)
GLUCOSE BLD-MCNC: 146 MG/DL (ref 65–99)
GLUCOSE BLD-MCNC: 187 MG/DL (ref 65–99)
GLUCOSE BLD-MCNC: 201 MG/DL (ref 65–99)
GLUCOSE BLD-MCNC: 201 MG/DL (ref 65–99)
GLUCOSE SERPL-MCNC: 131 MG/DL (ref 65–99)
HCT VFR BLD AUTO: 24.7 % (ref 42–52)
HGB BLD-MCNC: 8.4 G/DL (ref 14–18)
IMM GRANULOCYTES # BLD AUTO: 0.06 K/UL (ref 0–0.11)
IMM GRANULOCYTES NFR BLD AUTO: 0.9 % (ref 0–0.9)
IRON SATN MFR SERPL: 19 % (ref 15–55)
IRON SERPL-MCNC: 45 UG/DL (ref 50–180)
LYMPHOCYTES # BLD AUTO: 0.83 K/UL (ref 1–4.8)
LYMPHOCYTES NFR BLD: 12.4 % (ref 22–41)
MAGNESIUM SERPL-MCNC: 1.6 MG/DL (ref 1.5–2.5)
MCH RBC QN AUTO: 30.7 PG (ref 27–33)
MCHC RBC AUTO-ENTMCNC: 34 G/DL (ref 33.7–35.3)
MCV RBC AUTO: 90.1 FL (ref 81.4–97.8)
MONOCYTES # BLD AUTO: 0.79 K/UL (ref 0–0.85)
MONOCYTES NFR BLD AUTO: 11.8 % (ref 0–13.4)
NEUTROPHILS # BLD AUTO: 4.95 K/UL (ref 1.82–7.42)
NEUTROPHILS NFR BLD: 74.4 % (ref 44–72)
NRBC # BLD AUTO: 0 K/UL
NRBC BLD AUTO-RTO: 0 /100 WBC
PHOSPHATE SERPL-MCNC: 1.7 MG/DL (ref 2.5–4.5)
PLATELET # BLD AUTO: 228 K/UL (ref 164–446)
PMV BLD AUTO: 8.9 FL (ref 9–12.9)
POTASSIUM SERPL-SCNC: 3.5 MMOL/L (ref 3.6–5.5)
PROT SERPL-MCNC: 6.1 G/DL (ref 6–8.2)
RBC # BLD AUTO: 2.74 M/UL (ref 4.7–6.1)
SODIUM SERPL-SCNC: 131 MMOL/L (ref 135–145)
TIBC SERPL-MCNC: 231 UG/DL (ref 250–450)
WBC # BLD AUTO: 6.7 K/UL (ref 4.8–10.8)

## 2017-05-14 PROCEDURE — A9270 NON-COVERED ITEM OR SERVICE: HCPCS | Performed by: INTERNAL MEDICINE

## 2017-05-14 PROCEDURE — 82728 ASSAY OF FERRITIN: CPT

## 2017-05-14 PROCEDURE — 700111 HCHG RX REV CODE 636 W/ 250 OVERRIDE (IP): Performed by: ORTHOPAEDIC SURGERY

## 2017-05-14 PROCEDURE — 83540 ASSAY OF IRON: CPT

## 2017-05-14 PROCEDURE — A9270 NON-COVERED ITEM OR SERVICE: HCPCS | Performed by: NURSE PRACTITIONER

## 2017-05-14 PROCEDURE — 80053 COMPREHEN METABOLIC PANEL: CPT

## 2017-05-14 PROCEDURE — 700102 HCHG RX REV CODE 250 W/ 637 OVERRIDE(OP): Performed by: NURSE PRACTITIONER

## 2017-05-14 PROCEDURE — 85025 COMPLETE CBC W/AUTO DIFF WBC: CPT

## 2017-05-14 PROCEDURE — 83550 IRON BINDING TEST: CPT

## 2017-05-14 PROCEDURE — 36415 COLL VENOUS BLD VENIPUNCTURE: CPT

## 2017-05-14 PROCEDURE — A9270 NON-COVERED ITEM OR SERVICE: HCPCS | Performed by: HOSPITALIST

## 2017-05-14 PROCEDURE — 700102 HCHG RX REV CODE 250 W/ 637 OVERRIDE(OP): Performed by: INTERNAL MEDICINE

## 2017-05-14 PROCEDURE — 82962 GLUCOSE BLOOD TEST: CPT

## 2017-05-14 PROCEDURE — 770006 HCHG ROOM/CARE - MED/SURG/GYN SEMI*

## 2017-05-14 PROCEDURE — 700102 HCHG RX REV CODE 250 W/ 637 OVERRIDE(OP): Performed by: HOSPITALIST

## 2017-05-14 PROCEDURE — 99232 SBSQ HOSP IP/OBS MODERATE 35: CPT | Performed by: HOSPITALIST

## 2017-05-14 PROCEDURE — 84100 ASSAY OF PHOSPHORUS: CPT

## 2017-05-14 PROCEDURE — 83735 ASSAY OF MAGNESIUM: CPT

## 2017-05-14 RX ORDER — LISINOPRIL 10 MG/1
10 TABLET ORAL
Status: DISCONTINUED | OUTPATIENT
Start: 2017-05-14 | End: 2017-05-23 | Stop reason: HOSPADM

## 2017-05-14 RX ORDER — QUETIAPINE FUMARATE 100 MG/1
200 TABLET, FILM COATED ORAL EVERY EVENING
Status: DISCONTINUED | OUTPATIENT
Start: 2017-05-14 | End: 2017-05-16

## 2017-05-14 RX ORDER — DULOXETIN HYDROCHLORIDE 60 MG/1
60 CAPSULE, DELAYED RELEASE ORAL DAILY
Status: DISCONTINUED | OUTPATIENT
Start: 2017-05-14 | End: 2017-05-23 | Stop reason: HOSPADM

## 2017-05-14 RX ORDER — FAMOTIDINE 20 MG/1
20 TABLET, FILM COATED ORAL DAILY
Status: DISCONTINUED | OUTPATIENT
Start: 2017-05-14 | End: 2017-05-23 | Stop reason: HOSPADM

## 2017-05-14 RX ORDER — AMLODIPINE BESYLATE 5 MG/1
5 TABLET ORAL
Status: DISCONTINUED | OUTPATIENT
Start: 2017-05-14 | End: 2017-05-23 | Stop reason: HOSPADM

## 2017-05-14 RX ORDER — LEVOTHYROXINE SODIUM 0.03 MG/1
50 TABLET ORAL
Status: DISCONTINUED | OUTPATIENT
Start: 2017-05-14 | End: 2017-05-23 | Stop reason: HOSPADM

## 2017-05-14 RX ADMIN — AMLODIPINE BESYLATE 5 MG: 5 TABLET ORAL at 13:20

## 2017-05-14 RX ADMIN — LABETALOL HYDROCHLORIDE 10 MG: 5 INJECTION INTRAVENOUS at 04:22

## 2017-05-14 RX ADMIN — ACETAMINOPHEN 650 MG: 325 TABLET, FILM COATED ORAL at 13:19

## 2017-05-14 RX ADMIN — DULOXETINE HYDROCHLORIDE 60 MG: 60 CAPSULE, DELAYED RELEASE ORAL at 13:20

## 2017-05-14 RX ADMIN — QUETIAPINE FUMARATE 200 MG: 100 TABLET ORAL at 21:07

## 2017-05-14 RX ADMIN — ENOXAPARIN SODIUM 40 MG: 100 INJECTION SUBCUTANEOUS at 11:22

## 2017-05-14 RX ADMIN — ACETAMINOPHEN 650 MG: 325 TABLET, FILM COATED ORAL at 21:07

## 2017-05-14 RX ADMIN — INSULIN LISPRO 3 UNITS: 100 INJECTION, SOLUTION INTRAVENOUS; SUBCUTANEOUS at 16:55

## 2017-05-14 RX ADMIN — FERROUS SULFATE TAB 325 MG (65 MG ELEMENTAL FE) 325 MG: 325 (65 FE) TAB at 11:23

## 2017-05-14 RX ADMIN — TAMSULOSIN HYDROCHLORIDE 0.4 MG: 0.4 CAPSULE ORAL at 11:23

## 2017-05-14 RX ADMIN — STANDARDIZED SENNA CONCENTRATE AND DOCUSATE SODIUM 2 TABLET: 8.6; 5 TABLET, FILM COATED ORAL at 11:23

## 2017-05-14 RX ADMIN — OXYCODONE HYDROCHLORIDE 5 MG: 5 TABLET ORAL at 11:24

## 2017-05-14 RX ADMIN — INSULIN LISPRO 2 UNITS: 100 INJECTION, SOLUTION INTRAVENOUS; SUBCUTANEOUS at 21:04

## 2017-05-14 RX ADMIN — FAMOTIDINE 20 MG: 20 TABLET, FILM COATED ORAL at 13:19

## 2017-05-14 RX ADMIN — LEVOTHYROXINE SODIUM 50 MCG: 25 TABLET ORAL at 13:20

## 2017-05-14 RX ADMIN — LISINOPRIL 10 MG: 10 TABLET ORAL at 13:20

## 2017-05-14 RX ADMIN — ACETAMINOPHEN 650 MG: 325 TABLET, FILM COATED ORAL at 06:12

## 2017-05-14 RX ADMIN — STANDARDIZED SENNA CONCENTRATE AND DOCUSATE SODIUM 2 TABLET: 8.6; 5 TABLET, FILM COATED ORAL at 21:07

## 2017-05-14 RX ADMIN — MAGNESIUM HYDROXIDE 30 ML: 400 SUSPENSION ORAL at 11:23

## 2017-05-14 ASSESSMENT — PAIN SCALES - GENERAL: PAINLEVEL_OUTOF10: 4

## 2017-05-14 ASSESSMENT — LIFESTYLE VARIABLES
DO YOU DRINK ALCOHOL: NO
DO YOU DRINK ALCOHOL: NO

## 2017-05-14 ASSESSMENT — PAIN SCALES - WONG BAKER
WONGBAKER_NUMERICALRESPONSE: HURTS JUST A LITTLE BIT
WONGBAKER_NUMERICALRESPONSE: DOESN'T HURT AT ALL

## 2017-05-14 NOTE — CARE PLAN
Problem: Safety  Goal: Will remain free from injury  Intervention: Provide assistance with mobility  Hourly rounding, safety education, fall precautions including bed alarms, pt calls for assistance      Problem: Pain Management  Goal: Pain level will decrease to patient’s comfort goal  Intervention: Follow pain managment plan developed in collaboration with patient and Interdisciplinary Team  Pain meds given as needed per orders

## 2017-05-14 NOTE — CARE PLAN
Problem: Safety  Goal: Will remain free from injury  Outcome: PROGRESSING AS EXPECTED  Soft wrist restraints per active order for pulling lines.  Bed alarm in use.    Problem: Skin Integrity  Goal: Risk for impaired skin integrity will decrease  Outcome: PROGRESSING AS EXPECTED  Foam dressing on elbows, restraints checked Q2hrs. Frequent changing of soiled pads.

## 2017-05-14 NOTE — PROGRESS NOTES
Pt A&O to self; hx of dementia. No complaints of pain during day shift. Mathis in place per MD order. LLE dressing is dry, clean, and intact. Safety precautions in place. Bilat soft wrist restraints on per MD order; no injury present. IV fluids running without complication. 1.5 L NC with no shortness of breath. Family at bedside at times during day shift.

## 2017-05-14 NOTE — CARE PLAN
Problem: Communication  Goal: The ability to communicate needs accurately and effectively will improve  Outcome: PROGRESSING SLOWER THAN EXPECTED  Pt has hx of dementia. Reoriented pt to environment as needed; white board updated with day staff and return time. Pt notified of hourly rounding and unit routine.    Appropriate signs in place at doorway for pt. Pt allowed time to ask questions; no questions at this time.    Problem: Pain Management  Goal: Pain level will decrease to patient’s comfort goal  Outcome: PROGRESSING AS EXPECTED  Pt assessed for pain level routinely. Pt states pain level of 0/10 throughout day shift.

## 2017-05-14 NOTE — PROGRESS NOTES
Hospital Medicine Progress Note, Adult, Complex               Author: Josephkaren Garcia Date & Time created: 5/14/2017  12:57 PM     Interval History:  Patient seen and examined today.      All Data, Medication data reviewed.  Case discussed with nursing as available.  Plan of Care reviewed with patient and notified of changes.  84 y/o M with h/o reported dementia and admitted with fall and hip Fx, s/p DHS with Dr. Pantoja  5/12 Pt is pale and confused, looks chr. Ill  5/13 Pt remains confused and lethargic, AxOx2, wife at beside, gives addl. Info, eating soft diet with help, H/H lower, no overt bleeding, incision's ok  5/14 Pt remains pleasantly confused, denies much pain, in restraints for safety, BP elevated    Review of Systems:  Review of Systems   Unable to perform ROS: dementia       Physical Exam:  Physical Exam   Constitutional: He is oriented to person, place, and time. He appears well-developed and well-nourished. He appears lethargic. He has a sickly appearance. Nasal cannula in place.   HENT:   Head: Normocephalic.   edentulous   Eyes: Pupils are equal, round, and reactive to light.   Neck: Normal range of motion.   Cardiovascular: Normal rate, regular rhythm and normal heart sounds.    Pulmonary/Chest: Effort normal.   Abdominal: Soft. Bowel sounds are normal.   Genitourinary: Rectum normal and penis normal.   Musculoskeletal: Normal range of motion.   S/p L hip Fx repair  Dressing in place   Neurological: He is oriented to person, place, and time. He appears lethargic.   Skin: Skin is warm. There is pallor.   Psychiatric: His mood appears anxious. His speech is slurred. He is slowed. Cognition and memory are impaired. He expresses inappropriate judgment.   Nursing note and vitals reviewed.      Labs:        Invalid input(s): [id number]      Recent Labs      05/12/17   0307  05/13/17   0314  05/14/17   0218   SODIUM  134*  133*  131*   POTASSIUM  3.8  3.6  3.5*   CHLORIDE  99  102  99   CO2  23  26   28   BUN  14  20  14   CREATININE  1.07  0.98  0.81   MAGNESIUM   --   1.6  1.6   PHOSPHORUS   --   1.6*  1.7*   CALCIUM  8.8  8.2*  8.7     Recent Labs      17   ALTSGPT   --   12  13   ASTSGOT   --   31  29   ALKPHOSPHAT   --   96  84   TBILIRUBIN   --   0.4  0.5   GLUCOSE  70  188*  131*     Recent Labs      17   RBC  2.95*  2.52*  2.74*   HEMOGLOBIN  9.2*  7.7*  8.4*   HEMATOCRIT  27.3*  23.1*  24.7*   PLATELETCT  263  202  228   IRON   --    --   45*   FERRITIN   --    --   122.5   TOTIRONBC   --    --   231*     Recent Labs      17   WBC  9.0  6.6  6.7   NEUTSPOLYS   --   75.00*  74.40*   LYMPHOCYTES   --   9.50*  12.40*   MONOCYTES   --   14.20*  11.80   EOSINOPHILS   --   0.20  0.40   BASOPHILS   --   0.30  0.10   ASTSGOT   --   31  29   ALTSGPT   --   12  13   ALKPHOSPHAT   --   96  84   TBILIRUBIN   --   0.4  0.5           Hemodynamics:  Temp (24hrs), Av.6 °C (97.9 °F), Min:36.2 °C (97.1 °F), Max:37.1 °C (98.8 °F)  Temperature: 36.5 °C (97.7 °F)  Pulse  Av.4  Min: 75  Max: 130   Blood Pressure : (!) 174/80 mmHg (notifed nurse)     Respiratory:    Respiration: 15, Pulse Oximetry: 99 %        RUL Breath Sounds: Clear, RML Breath Sounds: Clear, RLL Breath Sounds: Diminished, PIO Breath Sounds: Clear, LLL Breath Sounds: Diminished  Fluids:    Intake/Output Summary (Last 24 hours) at 17 1257  Last data filed at 17 0400   Gross per 24 hour   Intake    726 ml   Output    900 ml   Net   -174 ml     Weight: 59 kg (130 lb 1.1 oz)  GI/Nutrition:  Orders Placed This Encounter   Procedures   • DIET ORDER     Standing Status: Standing      Number of Occurrences: 1      Standing Expiration Date:      Order Specific Question:  Diet:     Answer:  Regular [1]     Order Specific Question:  Texture/Fiber modifications:     Answer:  Dysphagia 1(Pureed)specify fluid  consistency(question 6) [1]      Comments:  per SLP, ok for dysphagia 2 diet but pt prefers dysphagia 1     Order Specific Question:  Consistency/Fluid modifications:     Answer:  Nectar Thick [2]     Medical Decision Making, by Problem:  Active Hospital Problems    Diagnosis   • Femur fracture, left (CMS-[facility]) [S72.92XA] s/p fall and repair with DHS   • Hypothyroidism [E03.9] c/w    • Depression [F32.9] reported   • Anemia, blood loss [D50.0] will monitor   • Iron deficiency [E61.1] Fe suppl.   • Hyponatremia [E87.1] follow   • Hypertension [I10] h/o monitor   • Controlled type 2 diabetes mellitus without complication, without long-term current use of insulin (CMS-HCC) [E11.9]   • Squamous cell cancer of tongue (CMS-HCC) [C02.9] per report   • GERD (gastroesophageal reflux disease) [K21.9] h/o   • Anxiety [F41.9] h/o   - Reported Dementia, advanced age  - urinary retention, started Flomax  Plan  C/w conservative post-op care  FE suppl and Fe studies  Pt  Ot  dispo to be evaluated, will need SNF  Monitor Anemia  Mobilize  Trial to get quinteros out  BP control  See orders    Radiology images reviewed, Labs reviewed and Medications reviewed  Quinteros catheter: No Quinteros      DVT Prophylaxis: Enoxaparin (Lovenox)  DVT prophylaxis - mechanical: SCDs  Ulcer prophylaxis: Yes    Assessed for rehab: Patient was assess for and/or received rehabilitation services during this hospitalization

## 2017-05-14 NOTE — PROGRESS NOTES
Bedside report received from Glenna CHAVEZ with Nieves CHAVEZ resident attending, pt a/a and orientated to self only, room air, IV s/l dressings cdi, quinteros in place at this time for retention, fall precautions in place including bed alarms, pt in soft restraints for safety and pulling of lines.

## 2017-05-15 LAB
ANION GAP SERPL CALC-SCNC: 7 MMOL/L (ref 0–11.9)
BUN SERPL-MCNC: 20 MG/DL (ref 8–22)
CALCIUM SERPL-MCNC: 8.4 MG/DL (ref 8.5–10.5)
CHLORIDE SERPL-SCNC: 100 MMOL/L (ref 96–112)
CO2 SERPL-SCNC: 26 MMOL/L (ref 20–33)
CREAT SERPL-MCNC: 0.83 MG/DL (ref 0.5–1.4)
ERYTHROCYTE [DISTWIDTH] IN BLOOD BY AUTOMATED COUNT: 42.3 FL (ref 35.9–50)
GFR SERPL CREATININE-BSD FRML MDRD: >60 ML/MIN/1.73 M 2
GLUCOSE BLD-MCNC: 128 MG/DL (ref 65–99)
GLUCOSE BLD-MCNC: 160 MG/DL (ref 65–99)
GLUCOSE BLD-MCNC: 186 MG/DL (ref 65–99)
GLUCOSE BLD-MCNC: 219 MG/DL (ref 65–99)
GLUCOSE SERPL-MCNC: 161 MG/DL (ref 65–99)
HCT VFR BLD AUTO: 25.1 % (ref 42–52)
HGB BLD-MCNC: 8.5 G/DL (ref 14–18)
MAGNESIUM SERPL-MCNC: 1.9 MG/DL (ref 1.5–2.5)
MCH RBC QN AUTO: 30.7 PG (ref 27–33)
MCHC RBC AUTO-ENTMCNC: 33.9 G/DL (ref 33.7–35.3)
MCV RBC AUTO: 90.6 FL (ref 81.4–97.8)
PHOSPHATE SERPL-MCNC: 1.6 MG/DL (ref 2.5–4.5)
PLATELET # BLD AUTO: 222 K/UL (ref 164–446)
PMV BLD AUTO: 8.9 FL (ref 9–12.9)
POTASSIUM SERPL-SCNC: 3.7 MMOL/L (ref 3.6–5.5)
RBC # BLD AUTO: 2.77 M/UL (ref 4.7–6.1)
SODIUM SERPL-SCNC: 133 MMOL/L (ref 135–145)
WBC # BLD AUTO: 5.2 K/UL (ref 4.8–10.8)

## 2017-05-15 PROCEDURE — A9270 NON-COVERED ITEM OR SERVICE: HCPCS | Performed by: INTERNAL MEDICINE

## 2017-05-15 PROCEDURE — A9270 NON-COVERED ITEM OR SERVICE: HCPCS | Performed by: HOSPITALIST

## 2017-05-15 PROCEDURE — 700102 HCHG RX REV CODE 250 W/ 637 OVERRIDE(OP): Performed by: HOSPITALIST

## 2017-05-15 PROCEDURE — 85027 COMPLETE CBC AUTOMATED: CPT

## 2017-05-15 PROCEDURE — 99232 SBSQ HOSP IP/OBS MODERATE 35: CPT | Performed by: INTERNAL MEDICINE

## 2017-05-15 PROCEDURE — 80048 BASIC METABOLIC PNL TOTAL CA: CPT

## 2017-05-15 PROCEDURE — 82962 GLUCOSE BLOOD TEST: CPT | Mod: 91

## 2017-05-15 PROCEDURE — 84100 ASSAY OF PHOSPHORUS: CPT

## 2017-05-15 PROCEDURE — 83735 ASSAY OF MAGNESIUM: CPT

## 2017-05-15 PROCEDURE — 51798 US URINE CAPACITY MEASURE: CPT

## 2017-05-15 PROCEDURE — 700102 HCHG RX REV CODE 250 W/ 637 OVERRIDE(OP): Performed by: NURSE PRACTITIONER

## 2017-05-15 PROCEDURE — 36415 COLL VENOUS BLD VENIPUNCTURE: CPT

## 2017-05-15 PROCEDURE — 770006 HCHG ROOM/CARE - MED/SURG/GYN SEMI*

## 2017-05-15 PROCEDURE — 700102 HCHG RX REV CODE 250 W/ 637 OVERRIDE(OP): Performed by: INTERNAL MEDICINE

## 2017-05-15 PROCEDURE — 700111 HCHG RX REV CODE 636 W/ 250 OVERRIDE (IP): Performed by: ORTHOPAEDIC SURGERY

## 2017-05-15 PROCEDURE — A9270 NON-COVERED ITEM OR SERVICE: HCPCS | Performed by: NURSE PRACTITIONER

## 2017-05-15 PROCEDURE — 92526 ORAL FUNCTION THERAPY: CPT

## 2017-05-15 RX ADMIN — DIBASIC SODIUM PHOSPHATE, MONOBASIC POTASSIUM PHOSPHATE AND MONOBASIC SODIUM PHOSPHATE 1 TABLET: 852; 155; 130 TABLET ORAL at 18:00

## 2017-05-15 RX ADMIN — DIBASIC SODIUM PHOSPHATE, MONOBASIC POTASSIUM PHOSPHATE AND MONOBASIC SODIUM PHOSPHATE 1 TABLET: 852; 155; 130 TABLET ORAL at 09:07

## 2017-05-15 RX ADMIN — ACETAMINOPHEN 650 MG: 325 TABLET, FILM COATED ORAL at 14:10

## 2017-05-15 RX ADMIN — LISINOPRIL 10 MG: 10 TABLET ORAL at 09:07

## 2017-05-15 RX ADMIN — INSULIN LISPRO 3 UNITS: 100 INJECTION, SOLUTION INTRAVENOUS; SUBCUTANEOUS at 11:45

## 2017-05-15 RX ADMIN — ACETAMINOPHEN 650 MG: 325 TABLET, FILM COATED ORAL at 23:41

## 2017-05-15 RX ADMIN — AMLODIPINE BESYLATE 5 MG: 5 TABLET ORAL at 09:07

## 2017-05-15 RX ADMIN — FAMOTIDINE 20 MG: 20 TABLET, FILM COATED ORAL at 09:07

## 2017-05-15 RX ADMIN — DIBASIC SODIUM PHOSPHATE, MONOBASIC POTASSIUM PHOSPHATE AND MONOBASIC SODIUM PHOSPHATE 1 TABLET: 852; 155; 130 TABLET ORAL at 23:41

## 2017-05-15 RX ADMIN — FERROUS SULFATE TAB 325 MG (65 MG ELEMENTAL FE) 325 MG: 325 (65 FE) TAB at 09:07

## 2017-05-15 RX ADMIN — ENOXAPARIN SODIUM 40 MG: 100 INJECTION SUBCUTANEOUS at 09:06

## 2017-05-15 RX ADMIN — ACETAMINOPHEN 650 MG: 325 TABLET, FILM COATED ORAL at 05:57

## 2017-05-15 RX ADMIN — TAMSULOSIN HYDROCHLORIDE 0.4 MG: 0.4 CAPSULE ORAL at 09:07

## 2017-05-15 RX ADMIN — INSULIN LISPRO 2 UNITS: 100 INJECTION, SOLUTION INTRAVENOUS; SUBCUTANEOUS at 16:41

## 2017-05-15 RX ADMIN — DULOXETINE HYDROCHLORIDE 60 MG: 60 CAPSULE, DELAYED RELEASE ORAL at 09:06

## 2017-05-15 RX ADMIN — LEVOTHYROXINE SODIUM 50 MCG: 25 TABLET ORAL at 05:57

## 2017-05-15 RX ADMIN — DIBASIC SODIUM PHOSPHATE, MONOBASIC POTASSIUM PHOSPHATE AND MONOBASIC SODIUM PHOSPHATE 1 TABLET: 852; 155; 130 TABLET ORAL at 12:05

## 2017-05-15 RX ADMIN — QUETIAPINE FUMARATE 200 MG: 100 TABLET ORAL at 20:32

## 2017-05-15 RX ADMIN — INSULIN LISPRO 2 UNITS: 100 INJECTION, SOLUTION INTRAVENOUS; SUBCUTANEOUS at 20:31

## 2017-05-15 RX ADMIN — STANDARDIZED SENNA CONCENTRATE AND DOCUSATE SODIUM 2 TABLET: 8.6; 5 TABLET, FILM COATED ORAL at 09:06

## 2017-05-15 ASSESSMENT — PAIN SCALES - WONG BAKER
WONGBAKER_NUMERICALRESPONSE: DOESN'T HURT AT ALL
WONGBAKER_NUMERICALRESPONSE: DOESN'T HURT AT ALL

## 2017-05-15 ASSESSMENT — PAIN SCALES - GENERAL
PAINLEVEL_OUTOF10: 0
PAINLEVEL_OUTOF10: 0

## 2017-05-15 NOTE — DISCHARGE PLANNING
Medical Social Work    Per TCN, pt spouse, Ailyn, had questions regarding obtaining POA for pt. SW attempted to call pt's spouse at [phone]. No answer, left message.

## 2017-05-15 NOTE — DISCHARGE PLANNING
Received notice form [facility], they will accept patient but he must be out of restraints for 24 hours.  Received call from  Tanna at [facility], they will accept if patient is out restraints x24 hours.

## 2017-05-15 NOTE — PROGRESS NOTES
Hospital Medicine Progress Note, Adult, Complex               Author: Tamra Dubois Date & Time created: 5/15/2017  11:24 AM     Interval History:  The patient was admitted after a fall with an acute left femoral neck fracture    5/11 he had surgical repair with Dr. Pantoja  Today he is fatigued but arouseable and confused  This morning he is out of restraints    Review of Systems:  Review of Systems   Unable to perform ROS: dementia       Physical Exam:  Physical Exam   Constitutional: No distress.   Eyes: Conjunctivae are normal. No scleral icterus.   Neck: Neck supple.   Cardiovascular: Normal rate.    No murmur heard.  Pulmonary/Chest: Effort normal and breath sounds normal.   Abdominal: Soft. Bowel sounds are normal.   Musculoskeletal: He exhibits no edema.   Skin: Skin is dry. No rash noted. He is not diaphoretic. There is pallor.   Nursing note and vitals reviewed.      Labs:        Invalid input(s): [id number]      Recent Labs      05/13/17   0314  05/14/17   0218  05/15/17   0236   SODIUM  133*  131*  133*   POTASSIUM  3.6  3.5*  3.7   CHLORIDE  102  99  100   CO2  26  28  26   BUN  20  14  20   CREATININE  0.98  0.81  0.83   MAGNESIUM  1.6  1.6  1.9   PHOSPHORUS  1.6*  1.7*  1.6*   CALCIUM  8.2*  8.7  8.4*     Recent Labs      05/13/17   0314  05/14/17   0218  05/15/17   0236   ALTSGPT  12  13   --    ASTSGOT  31  29   --    ALKPHOSPHAT  96  84   --    TBILIRUBIN  0.4  0.5   --    GLUCOSE  188*  131*  161*     Recent Labs      05/13/17   0314  05/14/17   0218  05/15/17   0236   RBC  2.52*  2.74*  2.77*   HEMOGLOBIN  7.7*  8.4*  8.5*   HEMATOCRIT  23.1*  24.7*  25.1*   PLATELETCT  202  228  222   IRON   --   45*   --    FERRITIN   --   122.5   --    TOTIRONBC   --   231*   --      Recent Labs      05/13/17   0314  05/14/17   0218  05/15/17   0236   WBC  6.6  6.7  5.2   NEUTSPOLYS  75.00*  74.40*   --    LYMPHOCYTES  9.50*  12.40*   --    MONOCYTES  14.20*  11.80   --    EOSINOPHILS  0.20  0.40   --     BASOPHILS  0.30  0.10   --    ASTSGOT  31  29   --    ALTSGPT  12  13   --    ALKPHOSPHAT  96  84   --    TBILIRUBIN  0.4  0.5   --            Hemodynamics:  Temp (24hrs), Av.6 °C (97.8 °F), Min:36.2 °C (97.1 °F), Max:37 °C (98.6 °F)  Temperature: 37 °C (98.6 °F)  Pulse  Av.5  Min: 67  Max: 130   Blood Pressure : 121/74 mmHg     Respiratory:    Respiration: 18, Pulse Oximetry: 99 %        RUL Breath Sounds: Clear, RML Breath Sounds: Clear, RLL Breath Sounds: Diminished, PIO Breath Sounds: Clear, LLL Breath Sounds: Diminished  Fluids:    Intake/Output Summary (Last 24 hours) at 05/15/17 1124  Last data filed at 05/15/17 0400   Gross per 24 hour   Intake    100 ml   Output   1150 ml   Net  -1050 ml        GI/Nutrition:  Orders Placed This Encounter   Procedures   • DIET ORDER     Standing Status: Standing      Number of Occurrences: 1      Standing Expiration Date:      Order Specific Question:  Diet:     Answer:  Regular [1]     Order Specific Question:  Texture/Fiber modifications:     Answer:  Dysphagia 1(Pureed)specify fluid consistency(question 6) [1]      Comments:  per SLP, ok for dysphagia 2 diet but pt prefers dysphagia 1     Order Specific Question:  Consistency/Fluid modifications:     Answer:  Nectar Thick [2]     Medical Decision Making, by Problem:  Active Hospital Problems    Diagnosis   • Hypertension [I10] blood pressure controlled on lisinopril and norvasc   • Hypothyroidism [E03.9] sytnthroid   • Depression [F32.9] cymbalta    • Anemia, blood loss [D50.0] hemoglobin stable, no need for transfusion   • Iron deficiency [E61.1] oral iron given   • Hyponatremia [E87.1] improving   • Femur fracture, left (CMS-HCC) [S72.92XA]repaired, continue physical therapy   • Controlled type 2 diabetes mellitus without complication, without long-term current use of insulin (CMS-HCC) [E11.9] controlled   • Squamous cell cancer of tongue (CMS-HCC) [C02.9]   • GERD (gastroesophageal reflux disease) [K21.9]  pepcid   • Anxiety [F41.9]   SNF placement when patient able to be out of restraints  DNR    Labs reviewed and Medications reviewed  Mathis catheter: No Mathis      DVT Prophylaxis: Enoxaparin (Lovenox)    Ulcer prophylaxis: Yes    Assessed for rehab: Patient was assess for and/or received rehabilitation services during this hospitalization

## 2017-05-15 NOTE — PROGRESS NOTES
Patient resting comfortably.  Right wrist restraint removed.  The only tubing the patient has is his oxygen tubing and he did not try to remove it with his freed left hand.  Discontinued restraints.

## 2017-05-15 NOTE — DISCHARGE PLANNING
Received choice form from [facility] Annabel at 0801.  Referral sent to [facility] and [place] at 0823 on 05-15-17.

## 2017-05-15 NOTE — DISCHARGE PLANNING
Medical Social Work    SW spoke with Alvino from [facility] who reported that the pt has been accepted and can take the pt once the pt has not been on restraints for 24 hours.

## 2017-05-15 NOTE — DISCHARGE PLANNING
TCN spoke with patient wife Ailyn regarding SNF choice, Wife selected [facility] and [facility]. Choice signed and faxed to [facility]. TCN to follow as needed for DC planning.

## 2017-05-15 NOTE — CARE PLAN
Problem: Safety  Goal: Will remain free from injury  Call light within reach, pt calls for assistance at all times. Bed and chair alarm in place for safety.   Bed in low position and locked, upper bedside rails up, proper mobility signs placed, personal possessions within reach, treaded socks on.  Hourly rounding in place.          Problem: Discharge Barriers/Planning  Goal: Patient’s continuum of care needs will be met  Pending SNF placement

## 2017-05-15 NOTE — PROGRESS NOTES
Dressing change completed with no c/o, quinteros removed per orders, tolerated well.  Patient tolerated well.

## 2017-05-15 NOTE — THERAPY
"Speech Language Therapy dysphagia treatment completed.   Functional Status:  Pt awake, alert but confused. Mentation slowed. Unaware of surgery since admit and asking when he can ho home. Pt cleared for dys2/ntl diet upon initial eval but changed to dys1 per RD as \"pt likes pureed more.\" Pt only agreeable to minimal trials from dys1/lunch and expressed general dislike of dys1 textures. No overt s/sx of aspiration noted.   Recommendations: Pt cleared for up to dys2/ntl diet at this time. Consider changing diet back to dys2 to see if pt eats more. Will continue to follow.    Plan of Care: Will benefit from Speech Therapy 3 times per week  Post-Acute Therapy: Discharge to a transitional care facility for continued skilled therapy services.    See \"Rehab Therapy-Acute\" Patient Summary Report for complete documentation.     "

## 2017-05-15 NOTE — PROGRESS NOTES
Pt has not voided this am, bladder [id number]. Pt st cathed, emptied 500ml of min bloody urine due to trauma with st cath. Dr. Dubois notified that this nurse used coude catheter and had difficulty inserting the catheter. Orders received to insert quinteros catheter and leave in place the next time he needs to be st cath again.

## 2017-05-15 NOTE — CARE PLAN
Problem: Safety  Goal: Will remain free from falls  Intervention: Assess risk factors for falls  Bed alarm on.  Bed in low position.  Patient monitored frequently.

## 2017-05-15 NOTE — PROGRESS NOTES
Assume care for patient at 0700. Pt AA/O X1 to self only. LS clear. On 1.5L NC. VSS. HRR. BS+ LBM 5/14 per report. Denies N/V. Pt still has no void since last st cath at 0300, Bladder scanned per protocol= 303ml, po fluids encouraged. CMS+ JUAN. WBAT to LLE. Left hip dressing cdi. PIV to rfa saline lock. Discussed hourly rounding and POC, pt agreeable.  Bed alarm in place for safety. Pt reoriented to skylight and call light at bedside and within reach.

## 2017-05-15 NOTE — PROGRESS NOTES
Patient was unable to urinate.  First bladder scan was 145.  At 0300 second bladder scan was 330.  Patient was straight cathed however only 250 was emptied from bladder.  Flow was very slow and sporadic then just stopped.

## 2017-05-15 NOTE — PROGRESS NOTES
Patient's quinteros was removed in the evening.  Patient was bladder scanned at midnight for 145. Patient has not had the urge to urinate. Will monitor.

## 2017-05-15 NOTE — DISCHARGE PLANNING
Unable to complete screening due to disorientation : Disoriented to Event, Disoriented to Place, Disoriented to Time. Call to pt wife was attempted at [phone]. VM was left.

## 2017-05-16 LAB
ANION GAP SERPL CALC-SCNC: 6 MMOL/L (ref 0–11.9)
BUN SERPL-MCNC: 17 MG/DL (ref 8–22)
CALCIUM SERPL-MCNC: 8.3 MG/DL (ref 8.5–10.5)
CHLORIDE SERPL-SCNC: 102 MMOL/L (ref 96–112)
CO2 SERPL-SCNC: 30 MMOL/L (ref 20–33)
CREAT SERPL-MCNC: 0.82 MG/DL (ref 0.5–1.4)
GFR SERPL CREATININE-BSD FRML MDRD: >60 ML/MIN/1.73 M 2
GLUCOSE BLD-MCNC: 122 MG/DL (ref 65–99)
GLUCOSE BLD-MCNC: 175 MG/DL (ref 65–99)
GLUCOSE BLD-MCNC: 235 MG/DL (ref 65–99)
GLUCOSE BLD-MCNC: 240 MG/DL (ref 65–99)
GLUCOSE SERPL-MCNC: 126 MG/DL (ref 65–99)
POTASSIUM SERPL-SCNC: 3.3 MMOL/L (ref 3.6–5.5)
SODIUM SERPL-SCNC: 138 MMOL/L (ref 135–145)

## 2017-05-16 PROCEDURE — 80048 BASIC METABOLIC PNL TOTAL CA: CPT

## 2017-05-16 PROCEDURE — 700102 HCHG RX REV CODE 250 W/ 637 OVERRIDE(OP): Performed by: INTERNAL MEDICINE

## 2017-05-16 PROCEDURE — A9270 NON-COVERED ITEM OR SERVICE: HCPCS | Performed by: NURSE PRACTITIONER

## 2017-05-16 PROCEDURE — 99233 SBSQ HOSP IP/OBS HIGH 50: CPT | Performed by: HOSPITALIST

## 2017-05-16 PROCEDURE — 36415 COLL VENOUS BLD VENIPUNCTURE: CPT

## 2017-05-16 PROCEDURE — 97530 THERAPEUTIC ACTIVITIES: CPT

## 2017-05-16 PROCEDURE — 306015 LOCK STAT FOLEY: Performed by: INTERNAL MEDICINE

## 2017-05-16 PROCEDURE — 700102 HCHG RX REV CODE 250 W/ 637 OVERRIDE(OP): Performed by: NURSE PRACTITIONER

## 2017-05-16 PROCEDURE — 700111 HCHG RX REV CODE 636 W/ 250 OVERRIDE (IP): Performed by: ORTHOPAEDIC SURGERY

## 2017-05-16 PROCEDURE — 700102 HCHG RX REV CODE 250 W/ 637 OVERRIDE(OP): Performed by: HOSPITALIST

## 2017-05-16 PROCEDURE — 770006 HCHG ROOM/CARE - MED/SURG/GYN SEMI*

## 2017-05-16 PROCEDURE — 51798 US URINE CAPACITY MEASURE: CPT

## 2017-05-16 PROCEDURE — A9270 NON-COVERED ITEM OR SERVICE: HCPCS | Performed by: INTERNAL MEDICINE

## 2017-05-16 PROCEDURE — A9270 NON-COVERED ITEM OR SERVICE: HCPCS | Performed by: HOSPITALIST

## 2017-05-16 PROCEDURE — 82962 GLUCOSE BLOOD TEST: CPT | Mod: 91

## 2017-05-16 RX ORDER — QUETIAPINE FUMARATE 100 MG/1
300 TABLET, FILM COATED ORAL EVERY EVENING
Status: DISCONTINUED | OUTPATIENT
Start: 2017-05-16 | End: 2017-05-23 | Stop reason: HOSPADM

## 2017-05-16 RX ADMIN — DIBASIC SODIUM PHOSPHATE, MONOBASIC POTASSIUM PHOSPHATE AND MONOBASIC SODIUM PHOSPHATE 1 TABLET: 852; 155; 130 TABLET ORAL at 06:20

## 2017-05-16 RX ADMIN — ACETAMINOPHEN 650 MG: 325 TABLET, FILM COATED ORAL at 00:26

## 2017-05-16 RX ADMIN — ACETAMINOPHEN 650 MG: 325 TABLET, FILM COATED ORAL at 06:19

## 2017-05-16 RX ADMIN — DULOXETINE HYDROCHLORIDE 60 MG: 60 CAPSULE, DELAYED RELEASE ORAL at 09:25

## 2017-05-16 RX ADMIN — LISINOPRIL 10 MG: 10 TABLET ORAL at 09:25

## 2017-05-16 RX ADMIN — LEVOTHYROXINE SODIUM 50 MCG: 25 TABLET ORAL at 06:19

## 2017-05-16 RX ADMIN — INSULIN LISPRO 3 UNITS: 100 INJECTION, SOLUTION INTRAVENOUS; SUBCUTANEOUS at 20:15

## 2017-05-16 RX ADMIN — DIBASIC SODIUM PHOSPHATE, MONOBASIC POTASSIUM PHOSPHATE AND MONOBASIC SODIUM PHOSPHATE 1 TABLET: 852; 155; 130 TABLET ORAL at 11:57

## 2017-05-16 RX ADMIN — FERROUS SULFATE TAB 325 MG (65 MG ELEMENTAL FE) 325 MG: 325 (65 FE) TAB at 09:25

## 2017-05-16 RX ADMIN — INSULIN LISPRO 2 UNITS: 100 INJECTION, SOLUTION INTRAVENOUS; SUBCUTANEOUS at 17:18

## 2017-05-16 RX ADMIN — DIBASIC SODIUM PHOSPHATE, MONOBASIC POTASSIUM PHOSPHATE AND MONOBASIC SODIUM PHOSPHATE 1 TABLET: 852; 155; 130 TABLET ORAL at 17:17

## 2017-05-16 RX ADMIN — AMLODIPINE BESYLATE 5 MG: 5 TABLET ORAL at 09:26

## 2017-05-16 RX ADMIN — ACETAMINOPHEN 650 MG: 325 TABLET, FILM COATED ORAL at 14:29

## 2017-05-16 RX ADMIN — ENOXAPARIN SODIUM 40 MG: 100 INJECTION SUBCUTANEOUS at 09:25

## 2017-05-16 RX ADMIN — INSULIN LISPRO 3 UNITS: 100 INJECTION, SOLUTION INTRAVENOUS; SUBCUTANEOUS at 11:56

## 2017-05-16 RX ADMIN — FAMOTIDINE 20 MG: 20 TABLET, FILM COATED ORAL at 09:25

## 2017-05-16 RX ADMIN — TAMSULOSIN HYDROCHLORIDE 0.4 MG: 0.4 CAPSULE ORAL at 09:25

## 2017-05-16 RX ADMIN — QUETIAPINE FUMARATE 300 MG: 100 TABLET ORAL at 20:16

## 2017-05-16 ASSESSMENT — PAIN SCALES - GENERAL: PAINLEVEL_OUTOF10: ASSUMED PAIN PRESENT

## 2017-05-16 ASSESSMENT — GAIT ASSESSMENTS
DEVIATION: STEP TO
DISTANCE (FEET): 3
GAIT LEVEL OF ASSIST: MODERATE ASSIST
ASSISTIVE DEVICE: FRONT WHEEL WALKER

## 2017-05-16 ASSESSMENT — PAIN SCALES - WONG BAKER: WONGBAKER_NUMERICALRESPONSE: DOESN'T HURT AT ALL

## 2017-05-16 NOTE — PROGRESS NOTES
Orthopaedic Progress Note    Interval changes:  Patient with mod confusion  Non compliant with dressings- incision without complication    ROS - Patient denies any new issues.  Pain well controlled.    Blood pressure 157/75, pulse 93, temperature 36.2 °C (97.2 °F), resp. rate 16, height 1.829 m (6'), weight 59 kg (130 lb 1.1 oz), SpO2 95 %.      Patient seen and examined  No acute distress  Breathing non labored  RRR  Left hip dressing removed by patient at time of exam.  Left hip surgical incision is well approximated and is dry and clean.  There is no erythema, induration, or signs of infection.  Patient clearly fires tibialis anterior, EHL, and gastrocnemius/soleus. Sensation is intact to light touch throughout superficial peroneal, deep peroneal, tibial, saphenous, and sural nerve distributions. Strong and palpable 2+ dorsalis pedis and posterior tibial pulses with capillary refill less than 2 seconds. No lower leg tenderness or discomfort.       Recent Labs      05/14/17   0218  05/15/17   0236   WBC  6.7  5.2   RBC  2.74*  2.77*   HEMOGLOBIN  8.4*  8.5*   HEMATOCRIT  24.7*  25.1*   MCV  90.1  90.6   MCH  30.7  30.7   MCHC  34.0  33.9   RDW  41.2  42.3   PLATELETCT  228  222   MPV  8.9*  8.9*       Active Hospital Problems    Diagnosis   • Hypertension [I10]     Priority: Low   • Hypothyroidism [E03.9]     Priority: Low   • Depression [F32.9]     Priority: Low   • Anemia, blood loss [D50.0]   • Iron deficiency [E61.1]   • Hyponatremia [E87.1]   • Femur fracture, left (CMS-[facility]) [S72.92XA]   • Controlled type 2 diabetes mellitus without complication, without long-term current use of insulin (CMS-[facility]) [E11.9]   • Squamous cell cancer of tongue (CMS-[facility]) [C02.9]   • GERD (gastroesophageal reflux disease) [K21.9]   • Anxiety [F41.9]       Assessment/Plan:  Non compliance with surgical dressings   POD#5 S/PSurgical treatment of left basicervical femoral neck fracture with dynamic hip screw and sideplate  Wt  bearing status - WBAT LLE  Wound care/Drains - dressing to be replaced by nursing as possible  Future Procedures - none planned   Lovenox: Start 5/12, Duration-until ambulatory > 150'  Sutures/Staples out- 10-14 days post operatively  PT/OT-initiated  Antibiotics: completed  DVT Prophylaxis- TEDS/SCDs/Foot pumps  Mathis-in place   Case Coordination for Discharge Planning - Disposition SNF

## 2017-05-16 NOTE — THERAPY
"Physical Therapy Treatment completed.   Bed Mobility:  Supine to Sit: Maximal Assist of 2. He does attempt to scoot self but requires assist to complete.  Transfers: Sit to Stand: Moderate Assist (of 2)  Gait: Level Of Assist: Moderate Assist (of 1-2) with Front-Wheel Walker WBAT on LLE to take steps to HOB.      Plan of Care: Will benefit from Physical Therapy 3 times per week  Discharge Recommendations: Equipment: Will Continue to Assess for Equipment Needs. Post-acute therapy Discharge to a transitional care facility for continued skilled therapy services.    Patient is improving with mobility and following 1-step commands consistently to participate in and benefit from therapy. He does have a posterior lean in sitting and standing limiting his balance. He was able to WBAT on LLE to take steps EOB today. Continue. Patient back in bed w/ alarm and lap belt at end of session as upon PT arrival.      See \"Rehab Therapy-Acute\" Patient Summary Report for complete documentation.       "

## 2017-05-16 NOTE — PROGRESS NOTES
Assumed care of pt at 1900  Received bedside shift report from Days RN  Assessment complete  Medicated per MAR. Medications crushed in applesauce  Tolerating dys2/nectar thick diet. Assist feed.  Oriented to self only. JUAN.  Dressing to left hip is CDI  Bed alarm on  Call light within reach  All needs met at this time  Will continue to monitor.

## 2017-05-16 NOTE — DISCHARGE PLANNING
Care Transition Team Assessment    Completed screenign by calling pt wife at [phone]. Wife stated plan is for pt to discharge to SNF, [facility] on [address].     Information Source  Orientation : Disoriented to Event, Disoriented to Place, Disoriented to Time  Information Given By: Spouse  Informant's Name: Ailyn Patel Risk  Legal Hold: No  Ambulatory or Self Mobile in Wheelchair: No-Not an Elopement Risk  Elopement Risk: Not at Risk for Elopement    Interdisciplinary Discharge Planning  Does Admitting Nurse Feel This Could be a Complex Discharge?: No  Primary Care Physician:    Lives with - Patient's Self Care Capacity: Spouse  Support Systems: Children, Spouse / Significant Other  Housing / Facility: [address]  Do You Take your Prescribed Medications Regularly: Yes  Able to Return to Previous ADL's: Future Time w/Therapy  Mobility Issues: Yes  Prior Services: None  Patient Expects to be Discharged to:: SNF  Assistance Needed: Yes  Durable Medical Equipment: Walker (and cane)    Discharge Preparedness  What is your plan after discharge?: Skilled nursing facility  What are your discharge supports?: Spouse  Prior Functional Level: Independent with Activities of Daily Living, Independent with Medication Management, Uses Walker  Difficulity with ADLs: Walking  Difficulty with ADLs Comment: Uses walker and cane  Difficulity with IADLs: Driving  Difficulity with IADL Comments: Pt spouse drives or pt son    Functional Assesment  Prior Functional Level: Independent with Activities of Daily Living, Independent with Medication Management, Uses Walker    Finances  Financial Barriers to Discharge: No  Prescription Coverage: Yes ([facility] [address])    Vision / Hearing Impairment  Vision Impairment : Yes  Right Eye Vision: Wears Glasses  Left Eye Vision: Wears Glasses  Hearing Impairment : No    Values / Beliefs / Concerns  Special Hospitalization Concerns: no         Domestic Abuse  Have you ever been the  victim of abuse or violence?: No  Physical Abuse or Sexual Abuse: No  Verbal Abuse or Emotional Abuse: No  Possible Abuse Reported to:: Not Applicable    Psychological Assessment  History of Substance Abuse: None    Discharge Risks or Barriers  Discharge risks or barriers?: No    Anticipated Discharge Information  Anticipated discharge disposition: SNF  Discharge Address: [facility] [address]

## 2017-05-16 NOTE — PROGRESS NOTES
Unable to void. Bladder scan = 600mL.   Procedure explained to patient. Pt is confused. Oriented to self only.  Active order to place quinteros. 12F coude used. Per days rn, 14f coude used on days to straight cath pt.  Tolerated well. 2 RN present to distract patient  Bed alarm on  Hourly rounding continued  Will continue to monitor.

## 2017-05-16 NOTE — CARE PLAN
Problem: Communication  Goal: The ability to communicate needs accurately and effectively will improve  Outcome: PROGRESSING AS EXPECTED    Problem: Safety  Goal: Will remain free from falls  Outcome: PROGRESSING AS EXPECTED    Problem: Infection  Goal: Will remain free from infection  Outcome: PROGRESSING AS EXPECTED    Problem: Bowel/Gastric:  Goal: Normal bowel function is maintained or improved  Outcome: PROGRESSING AS EXPECTED

## 2017-05-16 NOTE — DISCHARGE PLANNING
Medical Social Work    Pt has been accepted to [facility] and [facility]. Pt's family wishes for pt to D/C to [facility]. Pt is currently on restraints (lap belt).     PASRR completed (Under Manual Review).     Plan: Transfer to [facility] once pt has been off of restraints for 24 hours.

## 2017-05-17 LAB
GLUCOSE BLD-MCNC: 118 MG/DL (ref 65–99)
GLUCOSE BLD-MCNC: 157 MG/DL (ref 65–99)
GLUCOSE BLD-MCNC: 163 MG/DL (ref 65–99)
GLUCOSE BLD-MCNC: 166 MG/DL (ref 65–99)

## 2017-05-17 PROCEDURE — 99232 SBSQ HOSP IP/OBS MODERATE 35: CPT | Performed by: HOSPITALIST

## 2017-05-17 PROCEDURE — 700102 HCHG RX REV CODE 250 W/ 637 OVERRIDE(OP): Performed by: INTERNAL MEDICINE

## 2017-05-17 PROCEDURE — 82962 GLUCOSE BLOOD TEST: CPT | Mod: 91

## 2017-05-17 PROCEDURE — 770006 HCHG ROOM/CARE - MED/SURG/GYN SEMI*

## 2017-05-17 PROCEDURE — 700102 HCHG RX REV CODE 250 W/ 637 OVERRIDE(OP): Performed by: HOSPITALIST

## 2017-05-17 PROCEDURE — A9270 NON-COVERED ITEM OR SERVICE: HCPCS | Performed by: NURSE PRACTITIONER

## 2017-05-17 PROCEDURE — A9270 NON-COVERED ITEM OR SERVICE: HCPCS | Performed by: HOSPITALIST

## 2017-05-17 PROCEDURE — 700111 HCHG RX REV CODE 636 W/ 250 OVERRIDE (IP): Performed by: ORTHOPAEDIC SURGERY

## 2017-05-17 PROCEDURE — A9270 NON-COVERED ITEM OR SERVICE: HCPCS | Performed by: INTERNAL MEDICINE

## 2017-05-17 PROCEDURE — 700102 HCHG RX REV CODE 250 W/ 637 OVERRIDE(OP): Performed by: NURSE PRACTITIONER

## 2017-05-17 RX ADMIN — DIBASIC SODIUM PHOSPHATE, MONOBASIC POTASSIUM PHOSPHATE AND MONOBASIC SODIUM PHOSPHATE 1 TABLET: 852; 155; 130 TABLET ORAL at 17:56

## 2017-05-17 RX ADMIN — ENOXAPARIN SODIUM 40 MG: 100 INJECTION SUBCUTANEOUS at 07:45

## 2017-05-17 RX ADMIN — LEVOTHYROXINE SODIUM 50 MCG: 25 TABLET ORAL at 05:58

## 2017-05-17 RX ADMIN — FAMOTIDINE 20 MG: 20 TABLET, FILM COATED ORAL at 07:45

## 2017-05-17 RX ADMIN — ACETAMINOPHEN 650 MG: 325 TABLET, FILM COATED ORAL at 22:08

## 2017-05-17 RX ADMIN — DIBASIC SODIUM PHOSPHATE, MONOBASIC POTASSIUM PHOSPHATE AND MONOBASIC SODIUM PHOSPHATE 1 TABLET: 852; 155; 130 TABLET ORAL at 05:58

## 2017-05-17 RX ADMIN — QUETIAPINE FUMARATE 300 MG: 100 TABLET ORAL at 22:08

## 2017-05-17 RX ADMIN — DIBASIC SODIUM PHOSPHATE, MONOBASIC POTASSIUM PHOSPHATE AND MONOBASIC SODIUM PHOSPHATE 1 TABLET: 852; 155; 130 TABLET ORAL at 00:25

## 2017-05-17 RX ADMIN — DIBASIC SODIUM PHOSPHATE, MONOBASIC POTASSIUM PHOSPHATE AND MONOBASIC SODIUM PHOSPHATE 1 TABLET: 852; 155; 130 TABLET ORAL at 22:57

## 2017-05-17 RX ADMIN — DIBASIC SODIUM PHOSPHATE, MONOBASIC POTASSIUM PHOSPHATE AND MONOBASIC SODIUM PHOSPHATE 1 TABLET: 852; 155; 130 TABLET ORAL at 10:50

## 2017-05-17 RX ADMIN — ACETAMINOPHEN 650 MG: 325 TABLET, FILM COATED ORAL at 14:07

## 2017-05-17 RX ADMIN — INSULIN LISPRO 2 UNITS: 100 INJECTION, SOLUTION INTRAVENOUS; SUBCUTANEOUS at 22:15

## 2017-05-17 RX ADMIN — INSULIN LISPRO 2 UNITS: 100 INJECTION, SOLUTION INTRAVENOUS; SUBCUTANEOUS at 16:39

## 2017-05-17 RX ADMIN — FERROUS SULFATE TAB 325 MG (65 MG ELEMENTAL FE) 325 MG: 325 (65 FE) TAB at 07:46

## 2017-05-17 RX ADMIN — TAMSULOSIN HYDROCHLORIDE 0.4 MG: 0.4 CAPSULE ORAL at 07:45

## 2017-05-17 RX ADMIN — DULOXETINE HYDROCHLORIDE 60 MG: 60 CAPSULE, DELAYED RELEASE ORAL at 07:45

## 2017-05-17 RX ADMIN — AMLODIPINE BESYLATE 5 MG: 5 TABLET ORAL at 07:46

## 2017-05-17 RX ADMIN — INSULIN LISPRO 2 UNITS: 100 INJECTION, SOLUTION INTRAVENOUS; SUBCUTANEOUS at 10:45

## 2017-05-17 RX ADMIN — ACETAMINOPHEN 650 MG: 325 TABLET, FILM COATED ORAL at 05:58

## 2017-05-17 RX ADMIN — LISINOPRIL 10 MG: 10 TABLET ORAL at 07:46

## 2017-05-17 ASSESSMENT — LIFESTYLE VARIABLES: DO YOU DRINK ALCOHOL: NO

## 2017-05-17 NOTE — PROGRESS NOTES
Report received. Assumed care. Pt in bed awake. Disoriented to time, place and event. VSS. Denies pain, SOB. Assessment complete. Dressing to the left hip in place, cdi. Sacrum red but blanching. Lap belt in place, pt able to demonstrate how to undo lap belt. SCDs on. Discussed POC, pain control, mobility, safety, DC planning , pt verbalizes understanding. Explained importance of calling before getting OOB. Call light and belongings within reach. Bed alarm on. Bed in the lowest position. Treaded socks in place. Hourly rounding in progress. Will continue to monitor .

## 2017-05-17 NOTE — CARE PLAN
Problem: Safety  Goal: Will remain free from injury  Treaded socks in place, bed in the lowest position, bed alarm on, call light and belongings within reach, pt call for assistance appropriately    Problem: Venous Thromboembolism (VTW)/Deep Vein Thrombosis (DVT) Prevention:  Goal: Patient will participate in Venous Thrombosis (VTE)/Deep Vein Thrombosis (DVT)Prevention Measures  scds on, receiving lovenox  per MAR    Problem: Pain Management  Goal: Pain level will decrease to patient’s comfort goal  Denies pain at this time, hourly rounding in progress    Problem: Skin Integrity  Goal: Risk for impaired skin integrity will decrease  patricia risk assessment, pt turns self from side to side

## 2017-05-17 NOTE — PROGRESS NOTES
Orthopaedic Progress Note    Interval changes:  Dressing changed incision without complication    ROS - Patient denies any new issues.  Pain well controlled.    Blood pressure 135/64, pulse 91, temperature 37.1 °C (98.7 °F), resp. rate 16, height 1.829 m (6'), weight 59 kg (130 lb 1.1 oz), SpO2 95 %.      Patient seen and examined  No acute distress  Breathing non labored  RRR  Left hip dressings changed.  Left hip surgical incision is well approximated and is dry and clean.  There is no erythema, induration, or signs of infection.  Patient clearly fires tibialis anterior, EHL, and gastrocnemius/soleus. Sensation is intact to light touch throughout superficial peroneal, deep peroneal, tibial, saphenous, and sural nerve distributions. Strong and palpable 2+ dorsalis pedis and posterior tibial pulses with capillary refill less than 2 seconds. No lower leg tenderness or discomfort.       Recent Labs      05/15/17   0236   WBC  5.2   RBC  2.77*   HEMOGLOBIN  8.5*   HEMATOCRIT  25.1*   MCV  90.6   MCH  30.7   MCHC  33.9   RDW  42.3   PLATELETCT  222   MPV  8.9*       Active Hospital Problems    Diagnosis   • Hypertension [I10]     Priority: Low   • Hypothyroidism [E03.9]     Priority: Low   • Depression [F32.9]     Priority: Low   • Anemia, blood loss [D50.0]   • Iron deficiency [E61.1]   • Hyponatremia [E87.1]   • Femur fracture, left (CMS-H[facility]) [S72.92XA]   • Controlled type 2 diabetes mellitus without complication, without long-term current use of insulin (CMS-HCC) [E11.9]   • Squamous cell cancer of tongue (CMS-HCC) [C02.9]   • GERD (gastroesophageal reflux disease) [K21.9]   • Anxiety [F41.9]       Assessment/Plan:  Cleared for DC by ortho pending medicine clearance  POD#6 S/PSurgical treatment of left basicervical femoral neck fracture with dynamic hip screw and sideplate  Wt bearing status - WBAT LLE  Wound care/Drains - dressing changed every other day by nursing  Future Procedures - none planned   Lovenox:  Start 5/12, Duration-until ambulatory > 150'  Sutures/Staples out- 10-14 days post operatively  PT/OT-initiated  Antibiotics: completed  DVT Prophylaxis- TEDS/SCDs/Foot pumps  Mathis-in place   Case Coordination for Discharge Planning - Disposition SNF

## 2017-05-17 NOTE — PROGRESS NOTES
Hospital Medicine Progress Note, Adult, Complex               Author: Janet Wheeler Date & Time created: 5/16/2017  6:03 PM     Interval History:  The patient was admitted after a fall with an acute left femoral neck fracture    5/11 he had surgical repair with Dr. Pantoja  Out of restraints today  Continues to dig in his diaper and spread feces on himself    Review of Systems:  Review of Systems   Unable to perform ROS: dementia       Physical Exam:  Physical Exam   Constitutional: No distress.   Eyes: Conjunctivae are normal. No scleral icterus.   Neck: Neck supple.   Cardiovascular: Normal rate.    No murmur heard.  Pulmonary/Chest: Effort normal and breath sounds normal.   Abdominal: Soft. Bowel sounds are normal.   Musculoskeletal: He exhibits no edema.   Skin: Skin is dry. No rash noted. He is not diaphoretic. There is pallor.   Nursing note and vitals reviewed.      Labs:        Invalid input(s): [id number]      Recent Labs      05/14/17   0218  05/15/17   0236  05/16/17   0311   SODIUM  131*  133*  138   POTASSIUM  3.5*  3.7  3.3*   CHLORIDE  99  100  102   CO2  28  26  30   BUN  14  20  17   CREATININE  0.81  0.83  0.82   MAGNESIUM  1.6  1.9   --    PHOSPHORUS  1.7*  1.6*   --    CALCIUM  8.7  8.4*  8.3*     Recent Labs      05/14/17   0218  05/15/17   0236  05/16/17   0311   ALTSGPT  13   --    --    ASTSGOT  29   --    --    ALKPHOSPHAT  84   --    --    TBILIRUBIN  0.5   --    --    GLUCOSE  131*  161*  126*     Recent Labs      05/14/17   0218  05/15/17   0236   RBC  2.74*  2.77*   HEMOGLOBIN  8.4*  8.5*   HEMATOCRIT  24.7*  25.1*   PLATELETCT  228  222   IRON  45*   --    FERRITIN  122.5   --    TOTIRONBC  231*   --      Recent Labs      05/14/17   0218  05/15/17   0236   WBC  6.7  5.2   NEUTSPOLYS  74.40*   --    LYMPHOCYTES  12.40*   --    MONOCYTES  11.80   --    EOSINOPHILS  0.40   --    BASOPHILS  0.10   --    ASTSGOT  29   --    ALTSGPT  13   --    ALKPHOSPHAT  84   --    TBILIRUBIN  0.5    --            Hemodynamics:  Temp (24hrs), Av.6 °C (97.9 °F), Min:36.2 °C (97.2 °F), Max:36.8 °C (98.3 °F)  Temperature: 36.2 °C (97.2 °F)  Pulse  Av.7  Min: 67  Max: 130   Blood Pressure : 157/75 mmHg     Respiratory:    Respiration: 16, Pulse Oximetry: 95 %        RUL Breath Sounds: Clear, RML Breath Sounds: Clear, RLL Breath Sounds: Diminished, PIO Breath Sounds: Clear, LLL Breath Sounds: Diminished  Fluids:    Intake/Output Summary (Last 24 hours) at 17 1803  Last data filed at 17 1600   Gross per 24 hour   Intake      0 ml   Output   1600 ml   Net  -1600 ml        GI/Nutrition:  Orders Placed This Encounter   Procedures   • DIET ORDER     Standing Status: Standing      Number of Occurrences: 1      Standing Expiration Date:      Order Specific Question:  Diet:     Answer:  Regular [1]     Order Specific Question:  Texture/Fiber modifications:     Answer:  Dysphagia 1(Pureed)specify fluid consistency(question 6) [1]      Comments:  per SLP, ok for dysphagia 2 diet but pt prefers dysphagia 1     Order Specific Question:  Consistency/Fluid modifications:     Answer:  Nectar Thick [2]     Medical Decision Making, by Problem:  Active Hospital Problems    Diagnosis   • Hypertension [I10] blood pressure controlled on lisinopril and norvasc   • Hypothyroidism [E03.9] sytnthroid   • Depression [F32.9] cymbalta    • Anemia, blood loss [D50.0] hemoglobin stable, no need for transfusion   • Iron deficiency [E61.1] oral iron given   • Hyponatremia [E87.1] improving   • Femur fracture, left (CMS-HCC) [S72.92XA]repaired, continue physical therapy   • Controlled type 2 diabetes mellitus without complication, without long-term current use of insulin (CMS-HCC) [E11.9] controlled   • Squamous cell cancer of tongue (CMS-HCC) [C02.9]   • GERD (gastroesophageal reflux disease) [K21.9] pepcid   • Anxiety [F41.9]   Dementia: severe, with coprophagia  Continue seroquel, increase dose and follow  Wearing briefs to  discourage digging in his diaper    SNF placement when patient able to be out of restraints  DNR    Labs reviewed and Medications reviewed  Mathis catheter: No Mathis      DVT Prophylaxis: Enoxaparin (Lovenox)    Ulcer prophylaxis: Yes    Assessed for rehab: Patient was assess for and/or received rehabilitation services during this hospitalization

## 2017-05-17 NOTE — PROGRESS NOTES
Hospital Medicine Progress Note, Adult, Complex               Author: Janet Wheeler Date & Time created: 2017  1:27 PM     Interval History:  The patient was admitted after a fall with an acute left femoral neck fracture     he had surgical repair with Dr. Pantoja  Still in lap belt  Confused  Continues to dig in his diaper and spread feces on himself    Review of Systems:  Review of Systems   Unable to perform ROS: dementia       Physical Exam:  Physical Exam   Constitutional: No distress.   Eyes: Conjunctivae are normal. No scleral icterus.   Neck: Neck supple.   Cardiovascular: Normal rate.    No murmur heard.  Pulmonary/Chest: Effort normal and breath sounds normal.   Abdominal: Soft. Bowel sounds are normal.   Musculoskeletal: He exhibits no edema.   Skin: Skin is dry. No rash noted. He is not diaphoretic. There is pallor.   Nursing note and vitals reviewed.      Labs:        Invalid input(s): [id number]      Recent Labs      05/15/17   0236  17   0311   SODIUM  133*  138   POTASSIUM  3.7  3.3*   CHLORIDE  100  102   CO2  26  30   BUN  20  17   CREATININE  0.83  0.82   MAGNESIUM  1.9   --    PHOSPHORUS  1.6*   --    CALCIUM  8.4*  8.3*     Recent Labs      05/15/17   0236  17   0311   GLUCOSE  161*  126*     Recent Labs      05/15/17   0236   RBC  2.77*   HEMOGLOBIN  8.5*   HEMATOCRIT  25.1*   PLATELETCT  222     Recent Labs      05/15/17   0236   WBC  5.2           Hemodynamics:  Temp (24hrs), Av.2 °C (99 °F), Min:36.6 °C (97.8 °F), Max:37.6 °C (99.6 °F)  Temperature: 36.6 °C (97.8 °F)  Pulse  Av.1  Min: 67  Max: 130   Blood Pressure : 137/69 mmHg     Respiratory:    Respiration: 16, Pulse Oximetry: 95 %        RUL Breath Sounds: Clear, RML Breath Sounds: Clear, RLL Breath Sounds: Diminished, PIO Breath Sounds: Clear, LLL Breath Sounds: Diminished  Fluids:    Intake/Output Summary (Last 24 hours) at 17 1327  Last data filed at 17 0600   Gross per 24 hour   Intake       0 ml   Output   1300 ml   Net  -1300 ml        GI/Nutrition:  Orders Placed This Encounter   Procedures   • DIET ORDER     Standing Status: Standing      Number of Occurrences: 1      Standing Expiration Date:      Order Specific Question:  Diet:     Answer:  Regular [1]     Order Specific Question:  Texture/Fiber modifications:     Answer:  Dysphagia 1(Pureed)specify fluid consistency(question 6) [1]      Comments:  per SLP, ok for dysphagia 2 diet but pt prefers dysphagia 1     Order Specific Question:  Consistency/Fluid modifications:     Answer:  Nectar Thick [2]     Medical Decision Making, by Problem:  Active Hospital Problems    Diagnosis   • Hypertension [I10] blood pressure controlled on lisinopril and norvasc   • Hypothyroidism [E03.9] sytnthroid   • Depression [F32.9] cymbalta    • Anemia, blood loss [D50.0] hemoglobin stable, no need for transfusion   • Iron deficiency [E61.1] oral iron given   • Hyponatremia [E87.1] improving   • Femur fracture, left (CMS-HCC) [S72.92XA]repaired, continue physical therapy   • Controlled type 2 diabetes mellitus without complication, without long-term current use of insulin (CMS-HCC) [E11.9] controlled   • Squamous cell cancer of tongue (CMS-HCC) [C02.9]   • GERD (gastroesophageal reflux disease) [K21.9] pepcid   • Anxiety [F41.9]   Dementia: severe, with coprophagia  Continue seroquel, no change in behavior, continue higher dose for a few days and observe, consider trial of valproic acid instead  Wearing briefs to discourage digging in his diaper    SNF placement when patient able to be out of restraints  DNR    Labs reviewed and Medications reviewed  Mathis catheter: No Mathis      DVT Prophylaxis: Enoxaparin (Lovenox)    Ulcer prophylaxis: Yes    Assessed for rehab: Patient was assess for and/or received rehabilitation services during this hospitalization

## 2017-05-18 LAB
GLUCOSE BLD-MCNC: 110 MG/DL (ref 65–99)
GLUCOSE BLD-MCNC: 131 MG/DL (ref 65–99)
GLUCOSE BLD-MCNC: 153 MG/DL (ref 65–99)
GLUCOSE BLD-MCNC: 163 MG/DL (ref 65–99)

## 2017-05-18 PROCEDURE — 92526 ORAL FUNCTION THERAPY: CPT

## 2017-05-18 PROCEDURE — 700102 HCHG RX REV CODE 250 W/ 637 OVERRIDE(OP): Performed by: INTERNAL MEDICINE

## 2017-05-18 PROCEDURE — A9270 NON-COVERED ITEM OR SERVICE: HCPCS | Performed by: INTERNAL MEDICINE

## 2017-05-18 PROCEDURE — A9270 NON-COVERED ITEM OR SERVICE: HCPCS | Performed by: NURSE PRACTITIONER

## 2017-05-18 PROCEDURE — 700111 HCHG RX REV CODE 636 W/ 250 OVERRIDE (IP): Performed by: ORTHOPAEDIC SURGERY

## 2017-05-18 PROCEDURE — 82962 GLUCOSE BLOOD TEST: CPT

## 2017-05-18 PROCEDURE — 700102 HCHG RX REV CODE 250 W/ 637 OVERRIDE(OP): Performed by: HOSPITALIST

## 2017-05-18 PROCEDURE — A9270 NON-COVERED ITEM OR SERVICE: HCPCS | Performed by: HOSPITALIST

## 2017-05-18 PROCEDURE — 99232 SBSQ HOSP IP/OBS MODERATE 35: CPT | Performed by: HOSPITALIST

## 2017-05-18 PROCEDURE — 700102 HCHG RX REV CODE 250 W/ 637 OVERRIDE(OP): Performed by: NURSE PRACTITIONER

## 2017-05-18 PROCEDURE — 770006 HCHG ROOM/CARE - MED/SURG/GYN SEMI*

## 2017-05-18 RX ADMIN — ENOXAPARIN SODIUM 40 MG: 100 INJECTION SUBCUTANEOUS at 08:26

## 2017-05-18 RX ADMIN — DIBASIC SODIUM PHOSPHATE, MONOBASIC POTASSIUM PHOSPHATE AND MONOBASIC SODIUM PHOSPHATE 1 TABLET: 852; 155; 130 TABLET ORAL at 06:33

## 2017-05-18 RX ADMIN — FAMOTIDINE 20 MG: 20 TABLET, FILM COATED ORAL at 08:27

## 2017-05-18 RX ADMIN — TAMSULOSIN HYDROCHLORIDE 0.4 MG: 0.4 CAPSULE ORAL at 08:26

## 2017-05-18 RX ADMIN — INSULIN LISPRO 2 UNITS: 100 INJECTION, SOLUTION INTRAVENOUS; SUBCUTANEOUS at 22:08

## 2017-05-18 RX ADMIN — ACETAMINOPHEN 650 MG: 325 TABLET, FILM COATED ORAL at 06:33

## 2017-05-18 RX ADMIN — ACETAMINOPHEN 650 MG: 325 TABLET, FILM COATED ORAL at 22:02

## 2017-05-18 RX ADMIN — OXYCODONE HYDROCHLORIDE 5 MG: 5 TABLET ORAL at 22:03

## 2017-05-18 RX ADMIN — DIBASIC SODIUM PHOSPHATE, MONOBASIC POTASSIUM PHOSPHATE AND MONOBASIC SODIUM PHOSPHATE 1 TABLET: 852; 155; 130 TABLET ORAL at 22:24

## 2017-05-18 RX ADMIN — QUETIAPINE FUMARATE 300 MG: 100 TABLET ORAL at 22:02

## 2017-05-18 RX ADMIN — LISINOPRIL 10 MG: 10 TABLET ORAL at 08:26

## 2017-05-18 RX ADMIN — ACETAMINOPHEN 650 MG: 325 TABLET, FILM COATED ORAL at 13:09

## 2017-05-18 RX ADMIN — STANDARDIZED SENNA CONCENTRATE AND DOCUSATE SODIUM 2 TABLET: 8.6; 5 TABLET, FILM COATED ORAL at 22:03

## 2017-05-18 RX ADMIN — DIBASIC SODIUM PHOSPHATE, MONOBASIC POTASSIUM PHOSPHATE AND MONOBASIC SODIUM PHOSPHATE 1 TABLET: 852; 155; 130 TABLET ORAL at 18:21

## 2017-05-18 RX ADMIN — DIBASIC SODIUM PHOSPHATE, MONOBASIC POTASSIUM PHOSPHATE AND MONOBASIC SODIUM PHOSPHATE 1 TABLET: 852; 155; 130 TABLET ORAL at 13:09

## 2017-05-18 RX ADMIN — LEVOTHYROXINE SODIUM 50 MCG: 25 TABLET ORAL at 06:33

## 2017-05-18 RX ADMIN — FERROUS SULFATE TAB 325 MG (65 MG ELEMENTAL FE) 325 MG: 325 (65 FE) TAB at 08:26

## 2017-05-18 RX ADMIN — INSULIN LISPRO 2 UNITS: 100 INJECTION, SOLUTION INTRAVENOUS; SUBCUTANEOUS at 11:09

## 2017-05-18 RX ADMIN — DULOXETINE HYDROCHLORIDE 60 MG: 60 CAPSULE, DELAYED RELEASE ORAL at 08:26

## 2017-05-18 RX ADMIN — AMLODIPINE BESYLATE 5 MG: 5 TABLET ORAL at 08:26

## 2017-05-18 ASSESSMENT — LIFESTYLE VARIABLES: DO YOU DRINK ALCOHOL: NO

## 2017-05-18 ASSESSMENT — PAIN SCALES - GENERAL: PAINLEVEL_OUTOF10: ASSUMED PAIN PRESENT

## 2017-05-18 ASSESSMENT — PAIN SCALES - WONG BAKER: WONGBAKER_NUMERICALRESPONSE: HURTS A WHOLE LOT

## 2017-05-18 NOTE — CARE PLAN
Problem: Safety  Goal: Will remain free from injury  Outcome: PROGRESSING AS EXPECTED  Treaded socks in place, bed in the lowest position, bed alarm on, call light and belongings within reach, pt call for assistance appropriately    Problem: Venous Thromboembolism (VTW)/Deep Vein Thrombosis (DVT) Prevention:  Goal: Patient will participate in Venous Thrombosis (VTE)/Deep Vein Thrombosis (DVT)Prevention Measures  Outcome: PROGRESSING AS EXPECTED  SCD's on, pt. On lovenox

## 2017-05-18 NOTE — PROGRESS NOTES
Hospital Medicine Progress Note, Adult, Complex               Author: Janet Wheeler Date & Time created: 2017  1:49 PM     Interval History:  The patient was admitted after a fall with an acute left femoral neck fracture     he had surgical repair with Dr. Pantoja  Out of restraints completely  No longer smearing feces    Review of Systems:  Review of Systems   Unable to perform ROS: dementia       Physical Exam:  Physical Exam   Constitutional: No distress.   Eyes: Conjunctivae are normal. No scleral icterus.   Neck: Neck supple.   Cardiovascular: Normal rate.    No murmur heard.  Pulmonary/Chest: Effort normal and breath sounds normal.   Abdominal: Soft. Bowel sounds are normal.   Musculoskeletal: He exhibits no edema.   Skin: Skin is dry. No rash noted. He is not diaphoretic. There is pallor.   Nursing note and vitals reviewed.      Labs:        Invalid input(s): [id number]      Recent Labs      17   0311   SODIUM  138   POTASSIUM  3.3*   CHLORIDE  102   CO2  30   BUN  17   CREATININE  0.82   CALCIUM  8.3*     Recent Labs      17   0311   GLUCOSE  126*     No results for input(s): RBC, HEMOGLOBIN, HEMATOCRIT, PLATELETCT, PROTHROMBTM, APTT, INR, IRON, FERRITIN, TOTIRONBC in the last 72 hours.            Hemodynamics:  Temp (24hrs), Av.7 °C (98.1 °F), Min:36 °C (96.8 °F), Max:37.5 °C (99.5 °F)  Temperature: 37.5 °C (99.5 °F)  Pulse  Av.1  Min: 67  Max: 130   Blood Pressure : 130/68 mmHg     Respiratory:    Respiration: 18, Pulse Oximetry: 95 %        RUL Breath Sounds: Clear, RML Breath Sounds: Clear, RLL Breath Sounds: Diminished, POI Breath Sounds: Clear, LLL Breath Sounds: Diminished  Fluids:    Intake/Output Summary (Last 24 hours) at 17 1349  Last data filed at 17 0431   Gross per 24 hour   Intake    300 ml   Output   1300 ml   Net  -1000 ml        GI/Nutrition:  Orders Placed This Encounter   Procedures   • DIET ORDER     Standing Status: Standing      Number of  Occurrences: 1      Standing Expiration Date:      Order Specific Question:  Diet:     Answer:  Regular [1]     Order Specific Question:  Texture/Fiber modifications:     Answer:  Dysphagia 1(Pureed)specify fluid consistency(question 6) [1]      Comments:  per SLP, ok for dysphagia 2 diet but pt prefers dysphagia 1     Order Specific Question:  Consistency/Fluid modifications:     Answer:  Nectar Thick [2]     Medical Decision Making, by Problem:  Active Hospital Problems    Diagnosis   • Hypertension [I10] blood pressure controlled on lisinopril and norvasc   • Hypothyroidism [E03.9] sytnthroid   • Depression [F32.9] cymbalta    • Anemia, blood loss [D50.0] hemoglobin stable, no need for transfusion   • Iron deficiency [E61.1] oral iron given   • Hyponatremia [E87.1] improving   • Femur fracture, left (CMS-HCC) [S72.92XA]repaired, continue physical therapy   • Controlled type 2 diabetes mellitus without complication, without long-term current use of insulin (CMS-HCC) [E11.9] controlled   • Squamous cell cancer of tongue (CMS-HCC) [C02.9]   • GERD (gastroesophageal reflux disease) [K21.9] pepcid   • Anxiety [F41.9]   Dementia: severe, with coprophagia  Continue seroquel, good success with higher dose controlling behavior well    SNF placement, out of restraints and behavior controlled  Discussed with RN and   DNR    Labs reviewed and Medications reviewed  Mathis catheter: No Mathis      DVT Prophylaxis: Enoxaparin (Lovenox)    Ulcer prophylaxis: Yes    Assessed for rehab: Patient was assess for and/or received rehabilitation services during this hospitalization

## 2017-05-18 NOTE — CARE PLAN
Problem: Safety  Goal: Will remain free from falls  Intervention: Assess risk factors for falls  Bed alarm on.  Charting done outside patients room to monitor him.  Lap belt removed because patient appeared content.

## 2017-05-18 NOTE — CARE PLAN
Problem: Urinary Elimination:  Goal: Ability to reestablish a normal urinary elimination pattern will improve  Intervention: Evaluate need to continue indwelling urinary catheter  Mathis patent.  Urine output 225 up to this point.

## 2017-05-18 NOTE — PROGRESS NOTES
Report received. Assumed care of pt.. A/O x1, disoriented to place, time and event. VSS. Denies pain, SOB. Assessment complete, dressing to L hip in place CDI, sacrum red but blanching. Discussed POC, safety, D/C planning, pt verbalizes understanding. Explained importance of calling before getting OOB. Call light and belongings within reach. Bed alarm on. Bed in the lowest position. Treaded socks in place. Hourly rounding in progress. Will continue to monitor .

## 2017-05-18 NOTE — THERAPY
"Speech Language Therapy dysphagia treatment completed.   Functional Status:  Pt awake, alert but intermittently not cooperative. Tolerating modified diet. Per RN, wife purees all pts food at home.   Recommendations: Continue dys1/ntl diet. May be near baseline diet.    Plan of Care: Will benefit from Speech Therapy 3 times per week  Post-Acute Therapy: Discharge to a transitional care facility for continued skilled therapy services.    See \"Rehab Therapy-Acute\" Patient Summary Report for complete documentation.     "

## 2017-05-19 LAB
GLUCOSE BLD-MCNC: 101 MG/DL (ref 65–99)
GLUCOSE BLD-MCNC: 161 MG/DL (ref 65–99)
GLUCOSE BLD-MCNC: 191 MG/DL (ref 65–99)
GLUCOSE BLD-MCNC: 199 MG/DL (ref 65–99)

## 2017-05-19 PROCEDURE — A9270 NON-COVERED ITEM OR SERVICE: HCPCS | Performed by: HOSPITALIST

## 2017-05-19 PROCEDURE — 700102 HCHG RX REV CODE 250 W/ 637 OVERRIDE(OP): Performed by: HOSPITALIST

## 2017-05-19 PROCEDURE — 700102 HCHG RX REV CODE 250 W/ 637 OVERRIDE(OP): Performed by: NURSE PRACTITIONER

## 2017-05-19 PROCEDURE — 770006 HCHG ROOM/CARE - MED/SURG/GYN SEMI*

## 2017-05-19 PROCEDURE — A9270 NON-COVERED ITEM OR SERVICE: HCPCS | Performed by: INTERNAL MEDICINE

## 2017-05-19 PROCEDURE — 700102 HCHG RX REV CODE 250 W/ 637 OVERRIDE(OP): Performed by: INTERNAL MEDICINE

## 2017-05-19 PROCEDURE — 99232 SBSQ HOSP IP/OBS MODERATE 35: CPT | Performed by: HOSPITALIST

## 2017-05-19 PROCEDURE — 82962 GLUCOSE BLOOD TEST: CPT | Mod: 91

## 2017-05-19 PROCEDURE — 97530 THERAPEUTIC ACTIVITIES: CPT

## 2017-05-19 PROCEDURE — A9270 NON-COVERED ITEM OR SERVICE: HCPCS | Performed by: NURSE PRACTITIONER

## 2017-05-19 PROCEDURE — 700111 HCHG RX REV CODE 636 W/ 250 OVERRIDE (IP): Performed by: ORTHOPAEDIC SURGERY

## 2017-05-19 PROCEDURE — 97535 SELF CARE MNGMENT TRAINING: CPT

## 2017-05-19 RX ADMIN — FERROUS SULFATE TAB 325 MG (65 MG ELEMENTAL FE) 325 MG: 325 (65 FE) TAB at 07:59

## 2017-05-19 RX ADMIN — STANDARDIZED SENNA CONCENTRATE AND DOCUSATE SODIUM 2 TABLET: 8.6; 5 TABLET, FILM COATED ORAL at 21:15

## 2017-05-19 RX ADMIN — INSULIN LISPRO 2 UNITS: 100 INJECTION, SOLUTION INTRAVENOUS; SUBCUTANEOUS at 11:18

## 2017-05-19 RX ADMIN — FAMOTIDINE 20 MG: 20 TABLET, FILM COATED ORAL at 07:59

## 2017-05-19 RX ADMIN — ENOXAPARIN SODIUM 40 MG: 100 INJECTION SUBCUTANEOUS at 07:59

## 2017-05-19 RX ADMIN — LISINOPRIL 10 MG: 10 TABLET ORAL at 07:58

## 2017-05-19 RX ADMIN — LEVOTHYROXINE SODIUM 50 MCG: 25 TABLET ORAL at 06:18

## 2017-05-19 RX ADMIN — DIBASIC SODIUM PHOSPHATE, MONOBASIC POTASSIUM PHOSPHATE AND MONOBASIC SODIUM PHOSPHATE 1 TABLET: 852; 155; 130 TABLET ORAL at 06:18

## 2017-05-19 RX ADMIN — DULOXETINE HYDROCHLORIDE 60 MG: 60 CAPSULE, DELAYED RELEASE ORAL at 07:58

## 2017-05-19 RX ADMIN — ACETAMINOPHEN 650 MG: 325 TABLET, FILM COATED ORAL at 21:15

## 2017-05-19 RX ADMIN — AMLODIPINE BESYLATE 5 MG: 5 TABLET ORAL at 07:59

## 2017-05-19 RX ADMIN — DIBASIC SODIUM PHOSPHATE, MONOBASIC POTASSIUM PHOSPHATE AND MONOBASIC SODIUM PHOSPHATE 1 TABLET: 852; 155; 130 TABLET ORAL at 16:49

## 2017-05-19 RX ADMIN — INSULIN LISPRO 2 UNITS: 100 INJECTION, SOLUTION INTRAVENOUS; SUBCUTANEOUS at 21:25

## 2017-05-19 RX ADMIN — STANDARDIZED SENNA CONCENTRATE AND DOCUSATE SODIUM 2 TABLET: 8.6; 5 TABLET, FILM COATED ORAL at 07:59

## 2017-05-19 RX ADMIN — DIBASIC SODIUM PHOSPHATE, MONOBASIC POTASSIUM PHOSPHATE AND MONOBASIC SODIUM PHOSPHATE 1 TABLET: 852; 155; 130 TABLET ORAL at 11:22

## 2017-05-19 RX ADMIN — INSULIN LISPRO 2 UNITS: 100 INJECTION, SOLUTION INTRAVENOUS; SUBCUTANEOUS at 16:40

## 2017-05-19 RX ADMIN — QUETIAPINE FUMARATE 300 MG: 100 TABLET ORAL at 21:15

## 2017-05-19 RX ADMIN — TAMSULOSIN HYDROCHLORIDE 0.4 MG: 0.4 CAPSULE ORAL at 07:59

## 2017-05-19 RX ADMIN — ACETAMINOPHEN 650 MG: 325 TABLET, FILM COATED ORAL at 06:18

## 2017-05-19 ASSESSMENT — PAIN SCALES - GENERAL: PAINLEVEL_OUTOF10: 1

## 2017-05-19 ASSESSMENT — COGNITIVE AND FUNCTIONAL STATUS - GENERAL
TOILETING: TOTAL
DAILY ACTIVITIY SCORE: 14
HELP NEEDED FOR BATHING: A LOT
SUGGESTED CMS G CODE MODIFIER DAILY ACTIVITY: CK
DRESSING REGULAR LOWER BODY CLOTHING: TOTAL
PERSONAL GROOMING: A LITTLE
DRESSING REGULAR UPPER BODY CLOTHING: A LITTLE

## 2017-05-19 ASSESSMENT — LIFESTYLE VARIABLES: DO YOU DRINK ALCOHOL: NO

## 2017-05-19 ASSESSMENT — PAIN SCALES - WONG BAKER: WONGBAKER_NUMERICALRESPONSE: HURTS A WHOLE LOT

## 2017-05-19 NOTE — DISCHARGE PLANNING
Medical Social Work    PASRR completed ([id number]).     SW spoke with pt's spouse at [phone]. She is agreeable to having the pt go to [facility].

## 2017-05-19 NOTE — CARE PLAN
Problem: Pain Management  Goal: Pain level will decrease to patient’s comfort goal  Intervention: Follow pain managment plan developed in collaboration with patient and Interdisciplinary Team  Patient medicated once with Oxy 5 mg.  Patient was restless and stated that his left leg hurt a lot. Patient able to rest comfortably.

## 2017-05-19 NOTE — PROGRESS NOTES
"Pt has been pleasantly confused all day and cooperative with care. Bed alarm in place for safety. Pt remains out of restraints. Mathis intact and draining to gravity. Able to feed self meals with staff reminding pt to eat intermittently.  Able to demonstrate call light use during education but forgets to use for needs and will say \"excuse me miss\" when staff walks by his door. No family at bedside at this time, will monitor.   "

## 2017-05-19 NOTE — DISCHARGE PLANNING
Medical Social Work    SW spoke with Tanna from [facility] at [phone] who reported that they do not have any male beds available today.

## 2017-05-19 NOTE — PROGRESS NOTES
Patient slept very well.  No problems with patient trying to get out of bed or pull at his quinteros.

## 2017-05-19 NOTE — CARE PLAN
Problem: Safety  Goal: Will remain free from injury  Outcome: PROGRESSING AS EXPECTED    Problem: Bowel/Gastric:  Goal: Normal bowel function is maintained or improved  Outcome: PROGRESSING AS EXPECTED    Problem: Pain Management  Goal: Pain level will decrease to patient’s comfort goal  Outcome: PROGRESSING AS EXPECTED

## 2017-05-19 NOTE — THERAPY
"Occupational Therapy Treatment completed with focus on ADLs, ADL transfers, patient education and cognition.  Functional Status:  Min A supine > < EOB, min A squat-pivot transfers, total A LB dressing  Plan of Care: Will benefit from Occupational Therapy 3 times per week  Discharge Recommendations:  Equipment Will Continue to Assess for Equipment Needs. Post-acute therapy Discharge to a transitional care facility for continued skilled therapy services.    See \"Rehab Therapy-Acute\" Patient Summary Report for complete documentation.     Pt seen for OT tx to include: bed mobility, sit to stands at FWW, squat pivot transfers, 2 attempts at toileting for BM on BSC, LB undressing/dressing, return to supine in bed. Pt demos much improved mentation from eval, but still limited by memory/safety impairment. Standing also improved, but retropulses with FWW. Educated pt on use of call light. Pt able to demo during tx. Acute OT to follow.   "

## 2017-05-19 NOTE — CARE PLAN
Problem: Fluid Volume:  Goal: Will maintain balanced intake and output  Intervention: Monitor, educate, and encourage compliance with therapeutic intake of liquids  Patient given nectar thick liquids.  Patient swallowing well.

## 2017-05-19 NOTE — DISCHARGE PLANNING
Medical Social Work    Per [facility], [facility] has declined the pt.    NEO called pt's spouse at [phone] to inform her that the pt would not be transferring today and that the plan is to wait until [facility] has an available bed.

## 2017-05-20 LAB
GLUCOSE BLD-MCNC: 125 MG/DL (ref 65–99)
GLUCOSE BLD-MCNC: 137 MG/DL (ref 65–99)
GLUCOSE BLD-MCNC: 153 MG/DL (ref 65–99)
GLUCOSE BLD-MCNC: 157 MG/DL (ref 65–99)

## 2017-05-20 PROCEDURE — 700102 HCHG RX REV CODE 250 W/ 637 OVERRIDE(OP): Performed by: HOSPITALIST

## 2017-05-20 PROCEDURE — A9270 NON-COVERED ITEM OR SERVICE: HCPCS | Performed by: NURSE PRACTITIONER

## 2017-05-20 PROCEDURE — 700102 HCHG RX REV CODE 250 W/ 637 OVERRIDE(OP): Performed by: INTERNAL MEDICINE

## 2017-05-20 PROCEDURE — 700111 HCHG RX REV CODE 636 W/ 250 OVERRIDE (IP): Performed by: ORTHOPAEDIC SURGERY

## 2017-05-20 PROCEDURE — A9270 NON-COVERED ITEM OR SERVICE: HCPCS | Performed by: HOSPITALIST

## 2017-05-20 PROCEDURE — 82962 GLUCOSE BLOOD TEST: CPT

## 2017-05-20 PROCEDURE — 770006 HCHG ROOM/CARE - MED/SURG/GYN SEMI*

## 2017-05-20 PROCEDURE — 700102 HCHG RX REV CODE 250 W/ 637 OVERRIDE(OP): Performed by: NURSE PRACTITIONER

## 2017-05-20 PROCEDURE — A9270 NON-COVERED ITEM OR SERVICE: HCPCS | Performed by: INTERNAL MEDICINE

## 2017-05-20 PROCEDURE — 99232 SBSQ HOSP IP/OBS MODERATE 35: CPT | Performed by: HOSPITALIST

## 2017-05-20 RX ORDER — DIVALPROEX SODIUM 250 MG/1
250 TABLET, EXTENDED RELEASE ORAL DAILY
Status: DISCONTINUED | OUTPATIENT
Start: 2017-05-20 | End: 2017-05-23 | Stop reason: HOSPADM

## 2017-05-20 RX ADMIN — ACETAMINOPHEN 650 MG: 325 TABLET, FILM COATED ORAL at 05:58

## 2017-05-20 RX ADMIN — DIVALPROEX SODIUM 250 MG: 250 TABLET, EXTENDED RELEASE ORAL at 12:13

## 2017-05-20 RX ADMIN — STANDARDIZED SENNA CONCENTRATE AND DOCUSATE SODIUM 2 TABLET: 8.6; 5 TABLET, FILM COATED ORAL at 21:18

## 2017-05-20 RX ADMIN — AMLODIPINE BESYLATE 5 MG: 5 TABLET ORAL at 07:45

## 2017-05-20 RX ADMIN — INSULIN LISPRO 2 UNITS: 100 INJECTION, SOLUTION INTRAVENOUS; SUBCUTANEOUS at 12:13

## 2017-05-20 RX ADMIN — ENOXAPARIN SODIUM 40 MG: 100 INJECTION SUBCUTANEOUS at 07:45

## 2017-05-20 RX ADMIN — DIBASIC SODIUM PHOSPHATE, MONOBASIC POTASSIUM PHOSPHATE AND MONOBASIC SODIUM PHOSPHATE 1 TABLET: 852; 155; 130 TABLET ORAL at 18:00

## 2017-05-20 RX ADMIN — ACETAMINOPHEN 650 MG: 325 TABLET, FILM COATED ORAL at 12:13

## 2017-05-20 RX ADMIN — FAMOTIDINE 20 MG: 20 TABLET, FILM COATED ORAL at 07:45

## 2017-05-20 RX ADMIN — INSULIN LISPRO 2 UNITS: 100 INJECTION, SOLUTION INTRAVENOUS; SUBCUTANEOUS at 16:39

## 2017-05-20 RX ADMIN — DIBASIC SODIUM PHOSPHATE, MONOBASIC POTASSIUM PHOSPHATE AND MONOBASIC SODIUM PHOSPHATE 1 TABLET: 852; 155; 130 TABLET ORAL at 01:53

## 2017-05-20 RX ADMIN — ACETAMINOPHEN 650 MG: 325 TABLET, FILM COATED ORAL at 21:18

## 2017-05-20 RX ADMIN — LEVOTHYROXINE SODIUM 50 MCG: 25 TABLET ORAL at 05:57

## 2017-05-20 RX ADMIN — FERROUS SULFATE TAB 325 MG (65 MG ELEMENTAL FE) 325 MG: 325 (65 FE) TAB at 07:45

## 2017-05-20 RX ADMIN — LISINOPRIL 10 MG: 10 TABLET ORAL at 07:45

## 2017-05-20 RX ADMIN — TAMSULOSIN HYDROCHLORIDE 0.4 MG: 0.4 CAPSULE ORAL at 07:45

## 2017-05-20 RX ADMIN — QUETIAPINE FUMARATE 300 MG: 100 TABLET ORAL at 21:18

## 2017-05-20 RX ADMIN — STANDARDIZED SENNA CONCENTRATE AND DOCUSATE SODIUM 2 TABLET: 8.6; 5 TABLET, FILM COATED ORAL at 07:45

## 2017-05-20 RX ADMIN — DIBASIC SODIUM PHOSPHATE, MONOBASIC POTASSIUM PHOSPHATE AND MONOBASIC SODIUM PHOSPHATE 1 TABLET: 852; 155; 130 TABLET ORAL at 05:59

## 2017-05-20 RX ADMIN — DULOXETINE HYDROCHLORIDE 60 MG: 60 CAPSULE, DELAYED RELEASE ORAL at 07:45

## 2017-05-20 RX ADMIN — DIBASIC SODIUM PHOSPHATE, MONOBASIC POTASSIUM PHOSPHATE AND MONOBASIC SODIUM PHOSPHATE 1 TABLET: 852; 155; 130 TABLET ORAL at 12:13

## 2017-05-20 ASSESSMENT — PAIN SCALES - GENERAL: PAINLEVEL_OUTOF10: 1

## 2017-05-20 ASSESSMENT — PAIN SCALES - WONG BAKER: WONGBAKER_NUMERICALRESPONSE: HURTS A WHOLE LOT

## 2017-05-20 NOTE — CARE PLAN
Problem: Venous Thromboembolism (VTW)/Deep Vein Thrombosis (DVT) Prevention:  Goal: Patient will participate in Venous Thrombosis (VTE)/Deep Vein Thrombosis (DVT)Prevention Measures  Outcome: PROGRESSING AS EXPECTED  SCDs on    Problem: Pain Management  Goal: Pain level will decrease to patient’s comfort goal  Outcome: PROGRESSING AS EXPECTED  Pain currently controlled. Declines pain meds at this time.

## 2017-05-20 NOTE — PROGRESS NOTES
Orthopaedic Progress Note    Interval changes:  Incision open to air at time of exam-new dressing placed  Cleared for DC to SNF pending medicine clearance    ROS - Patient denies any new issues.  Pain well controlled.    Blood pressure 133/57, pulse 84, temperature 36.8 °C (98.2 °F), resp. rate 17, height 1.829 m (6'), weight 59 kg (130 lb 1.1 oz), SpO2 95 %.      Patient seen and examined  No acute distress  Breathing non labored  RRR  Left hip new dressing placed.  Left hip surgical incision is well approximated and is dry and clean.  There is no erythema, induration, or signs of infection.  Patient clearly fires tibialis anterior, EHL, and gastrocnemius/soleus. Sensation is intact to light touch throughout superficial peroneal, deep peroneal, tibial, saphenous, and sural nerve distributions. Strong and palpable 2+ dorsalis pedis and posterior tibial pulses with capillary refill less than 2 seconds. No lower leg tenderness or discomfort.      Active Hospital Problems    Diagnosis   • Hypertension [I10]     Priority: Low   • Hypothyroidism [E03.9]     Priority: Low   • Depression [F32.9]     Priority: Low   • Anemia, blood loss [D50.0]   • Iron deficiency [E61.1]   • Hyponatremia [E87.1]   • Femur fracture, left (CMS-[facility]) [S72.92XA]   • Controlled type 2 diabetes mellitus without complication, without long-term current use of insulin (CMS-HCC) [E11.9]   • Squamous cell cancer of tongue (CMS-HCC) [C02.9]   • GERD (gastroesophageal reflux disease) [K21.9]   • Anxiety [F41.9]       Assessment/Plan:  Cleared for DC by ortho for SNF DC pending medicine clearance  POD#9 S/PSurgical treatment of left basicervical femoral neck fracture with dynamic hip screw and sideplate  Wt bearing status - WBAT LLE  Wound care/Drains - dressing changed every other day by nursing  Future Procedures - none planned   Lovenox: Start 5/12, Duration-until ambulatory > 150'  Sutures/Staples out- 10-14 days post  operatively  PT/OT-initiated  Antibiotics: completed  DVT Prophylaxis- TEDS/SCDs/Foot pumps  Mathis-in place   Case Coordination for Discharge Planning - Disposition SNF

## 2017-05-20 NOTE — PROGRESS NOTES
AOx1. Oriented to self. Denies SOB. Denies nausea. Does not call appropriately. Bed alarm on. Left hip dressing clean, dry, and intact. Denies numbing or tingling on extremities. Pedal pulses +1. Mathis catheter draining yellow urine. SCDs on.

## 2017-05-20 NOTE — PROGRESS NOTES
Hospital Medicine Progress Note, Adult, Complex               Author: Janet Wheeler Date & Time created: 2017  5:06 PM     Interval History:  The patient was admitted after a fall with an acute left femoral neck fracture     he had surgical repair with Dr. Pantoja  Out of restraints completely  Calm, not digging in diaper or smearing feces, cooperative and eating though confused at baseline    Review of Systems:  Review of Systems   Unable to perform ROS: dementia       Physical Exam:  Physical Exam   Constitutional: No distress.   Eyes: Conjunctivae are normal. No scleral icterus.   Neck: Neck supple.   Cardiovascular: Normal rate.    No murmur heard.  Pulmonary/Chest: Effort normal and breath sounds normal.   Abdominal: Soft. Bowel sounds are normal.   Musculoskeletal: He exhibits no edema.   Skin: Skin is dry. No rash noted. He is not diaphoretic. There is pallor.   Psychiatric: He has a normal mood and affect. He is slowed. Cognition and memory are impaired.   Nursing note and vitals reviewed.      Labs:        Invalid input(s): [id number]      No results for input(s): SODIUM, POTASSIUM, CHLORIDE, CO2, BUN, CREATININE, MAGNESIUM, PHOSPHORUS, CALCIUM in the last 72 hours.  No results for input(s): ALTSGPT, ASTSGOT, ALKPHOSPHAT, TBILIRUBIN, DBILIRUBIN, GAMMAGT, AMYLASE, LIPASE, ALB, PREALBUMIN, GLUCOSE in the last 72 hours.  No results for input(s): RBC, HEMOGLOBIN, HEMATOCRIT, PLATELETCT, PROTHROMBTM, APTT, INR, IRON, FERRITIN, TOTIRONBC in the last 72 hours.            Hemodynamics:  Temp (24hrs), Av.7 °C (98.1 °F), Min:36.4 °C (97.6 °F), Max:36.9 °C (98.4 °F)  Temperature: 36.4 °C (97.6 °F)  Pulse  Av.8  Min: 67  Max: 130   Blood Pressure : 137/69 mmHg     Respiratory:    Respiration: 16, Pulse Oximetry: 94 %        RUL Breath Sounds: Clear, RML Breath Sounds: Clear, RLL Breath Sounds: Diminished, PIO Breath Sounds: Clear, LLL Breath Sounds: Diminished  Fluids:    Intake/Output Summary  (Last 24 hours) at 05/19/17 1706  Last data filed at 05/19/17 1600   Gross per 24 hour   Intake    120 ml   Output   1825 ml   Net  -1705 ml        GI/Nutrition:  Orders Placed This Encounter   Procedures   • DIET ORDER     Standing Status: Standing      Number of Occurrences: 1      Standing Expiration Date:      Order Specific Question:  Diet:     Answer:  Regular [1]     Order Specific Question:  Texture/Fiber modifications:     Answer:  Dysphagia 1(Pureed)specify fluid consistency(question 6) [1]      Comments:  per SLP, ok for dysphagia 2 diet but pt prefers dysphagia 1     Order Specific Question:  Consistency/Fluid modifications:     Answer:  Nectar Thick [2]     Medical Decision Making, by Problem:  Active Hospital Problems    Diagnosis   • Hypertension [I10] blood pressure controlled on lisinopril and norvasc   • Hypothyroidism [E03.9] sytnthroid   • Depression [F32.9] cymbalta    • Anemia, blood loss [D50.0] hemoglobin stable, no need for transfusion   • Iron deficiency [E61.1] oral iron given   • Hyponatremia [E87.1] improving   • Femur fracture, left (CMS-[facility]) [S72.92XA]repaired, continue physical therapy   • Controlled type 2 diabetes mellitus without complication, without long-term current use of insulin (CMS-[facility]) [E11.9] controlled   • Squamous cell cancer of tongue (CMS-[facility]) [C02.9]   • GERD (gastroesophageal reflux disease) [K21.9] pepcid   • Anxiety [F41.9]   Dementia: severe, with coprophagia  Continue seroquel, good success with higher dose controlling behavior well    SNF placement, out of restraints and behavior controlled  Discussed with RN and   DNR    Labs reviewed and Medications reviewed  Mathis catheter: No Mathis      DVT Prophylaxis: Enoxaparin (Lovenox)    Ulcer prophylaxis: Yes    Assessed for rehab: Patient was assess for and/or received rehabilitation services during this hospitalization

## 2017-05-20 NOTE — CARE PLAN
Problem: Safety  Goal: Will remain free from injury  Outcome: PROGRESSING AS EXPECTED    Problem: Pain Management  Goal: Pain level will decrease to patient’s comfort goal  Outcome: PROGRESSING AS EXPECTED    Problem: Skin Integrity  Goal: Risk for impaired skin integrity will decrease  Outcome: PROGRESSING AS EXPECTED

## 2017-05-20 NOTE — PROGRESS NOTES
Hospital Medicine Progress Note, Adult, Complex               Author: Janet Wheeler Date & Time created: 2017  9:55 AM     Interval History:  The patient was admitted after a fall with an acute left femoral neck fracture    Out of restraints  confused    Review of Systems:  Review of Systems   Unable to perform ROS: dementia       Physical Exam:  Physical Exam   Constitutional: He appears well-developed. No distress.   HENT:   Mouth/Throat: No oropharyngeal exudate.   Eyes: Conjunctivae are normal. No scleral icterus.   Neck: Neck supple.   Cardiovascular: Normal rate.    No murmur heard.  Pulmonary/Chest: Effort normal and breath sounds normal.   Abdominal: Soft. Bowel sounds are normal.   Musculoskeletal: He exhibits no edema.   Neurological: He is disoriented.   Skin: Skin is dry. No rash noted. He is not diaphoretic. There is pallor.   Psychiatric: He has a normal mood and affect. He is slowed. Cognition and memory are impaired.   Nursing note and vitals reviewed.      Labs:        Invalid input(s): [id number]      No results for input(s): SODIUM, POTASSIUM, CHLORIDE, CO2, BUN, CREATININE, MAGNESIUM, PHOSPHORUS, CALCIUM in the last 72 hours.  No results for input(s): ALTSGPT, ASTSGOT, ALKPHOSPHAT, TBILIRUBIN, DBILIRUBIN, GAMMAGT, AMYLASE, LIPASE, ALB, PREALBUMIN, GLUCOSE in the last 72 hours.  No results for input(s): RBC, HEMOGLOBIN, HEMATOCRIT, PLATELETCT, PROTHROMBTM, APTT, INR, IRON, FERRITIN, TOTIRONBC in the last 72 hours.            Hemodynamics:  Temp (24hrs), Av.5 °C (97.7 °F), Min:36.2 °C (97.2 °F), Max:36.9 °C (98.4 °F)  Temperature: 36.2 °C (97.2 °F)  Pulse  Av.3  Min: 67  Max: 130   Blood Pressure : 139/63 mmHg     Respiratory:    Respiration: 17, Pulse Oximetry: 96 %        RUL Breath Sounds: Clear, RML Breath Sounds: Clear, RLL Breath Sounds: Diminished, PIO Breath Sounds: Clear, LLL Breath Sounds: Diminished  Fluids:    Intake/Output Summary (Last 24 hours) at 17  0955  Last data filed at 05/20/17 0400   Gross per 24 hour   Intake      0 ml   Output   1075 ml   Net  -1075 ml        GI/Nutrition:  Orders Placed This Encounter   Procedures   • DIET ORDER     Standing Status: Standing      Number of Occurrences: 1      Standing Expiration Date:      Order Specific Question:  Diet:     Answer:  Regular [1]     Order Specific Question:  Texture/Fiber modifications:     Answer:  Dysphagia 1(Pureed)specify fluid consistency(question 6) [1]      Comments:  per SLP, ok for dysphagia 2 diet but pt prefers dysphagia 1     Order Specific Question:  Consistency/Fluid modifications:     Answer:  Nectar Thick [2]     Medical Decision Making, by Problem:  Active Hospital Problems    Diagnosis   • Hypertension [I10] blood pressure controlled on lisinopril and norvasc   • Hypothyroidism [E03.9] sytnthroid   • Depression [F32.9] cymbalta    • Anemia, blood loss [D50.0] hemoglobin stable, no need for transfusion   • Iron deficiency [E61.1] oral iron given   • Hyponatremia [E87.1] improving   • Femur fracture, left (CMS-HCC) [S72.92XA]repaired, continue physical therapy   • Controlled type 2 diabetes mellitus without complication, without long-term current use of insulin (CMS-HCC) [E11.9] controlled   • Squamous cell cancer of tongue (CMS-HCC) [C02.9]   • GERD (gastroesophageal reflux disease) [K21.9] pepcid   • Anxiety [F41.9]   Dementia: severe, with coprophagia improved behavior  Continue seroquel  Valproate ER    SNF placement pending bed availability  Discussed with RN and   DNR    Labs reviewed and Medications reviewed  Mathis catheter: No Mathis      DVT Prophylaxis: Enoxaparin (Lovenox)    Ulcer prophylaxis: Yes    Assessed for rehab: Patient was assess for and/or received rehabilitation services during this hospitalization

## 2017-05-21 LAB
GLUCOSE BLD-MCNC: 116 MG/DL (ref 65–99)
GLUCOSE BLD-MCNC: 156 MG/DL (ref 65–99)
GLUCOSE BLD-MCNC: 169 MG/DL (ref 65–99)

## 2017-05-21 PROCEDURE — 700102 HCHG RX REV CODE 250 W/ 637 OVERRIDE(OP): Performed by: HOSPITALIST

## 2017-05-21 PROCEDURE — 82962 GLUCOSE BLOOD TEST: CPT | Mod: 91

## 2017-05-21 PROCEDURE — A9270 NON-COVERED ITEM OR SERVICE: HCPCS | Performed by: NURSE PRACTITIONER

## 2017-05-21 PROCEDURE — A9270 NON-COVERED ITEM OR SERVICE: HCPCS | Performed by: INTERNAL MEDICINE

## 2017-05-21 PROCEDURE — A9270 NON-COVERED ITEM OR SERVICE: HCPCS | Performed by: HOSPITALIST

## 2017-05-21 PROCEDURE — 700111 HCHG RX REV CODE 636 W/ 250 OVERRIDE (IP): Performed by: ORTHOPAEDIC SURGERY

## 2017-05-21 PROCEDURE — 99232 SBSQ HOSP IP/OBS MODERATE 35: CPT | Performed by: HOSPITALIST

## 2017-05-21 PROCEDURE — 700102 HCHG RX REV CODE 250 W/ 637 OVERRIDE(OP): Performed by: INTERNAL MEDICINE

## 2017-05-21 PROCEDURE — 700102 HCHG RX REV CODE 250 W/ 637 OVERRIDE(OP): Performed by: NURSE PRACTITIONER

## 2017-05-21 PROCEDURE — 770006 HCHG ROOM/CARE - MED/SURG/GYN SEMI*

## 2017-05-21 RX ADMIN — DIVALPROEX SODIUM 250 MG: 250 TABLET, EXTENDED RELEASE ORAL at 08:12

## 2017-05-21 RX ADMIN — FAMOTIDINE 20 MG: 20 TABLET, FILM COATED ORAL at 08:11

## 2017-05-21 RX ADMIN — INSULIN LISPRO 2 UNITS: 100 INJECTION, SOLUTION INTRAVENOUS; SUBCUTANEOUS at 11:32

## 2017-05-21 RX ADMIN — LEVOTHYROXINE SODIUM 50 MCG: 25 TABLET ORAL at 05:57

## 2017-05-21 RX ADMIN — FERROUS SULFATE TAB 325 MG (65 MG ELEMENTAL FE) 325 MG: 325 (65 FE) TAB at 08:11

## 2017-05-21 RX ADMIN — DIBASIC SODIUM PHOSPHATE, MONOBASIC POTASSIUM PHOSPHATE AND MONOBASIC SODIUM PHOSPHATE 1 TABLET: 852; 155; 130 TABLET ORAL at 05:57

## 2017-05-21 RX ADMIN — DIBASIC SODIUM PHOSPHATE, MONOBASIC POTASSIUM PHOSPHATE AND MONOBASIC SODIUM PHOSPHATE 1 TABLET: 852; 155; 130 TABLET ORAL at 16:20

## 2017-05-21 RX ADMIN — LISINOPRIL 10 MG: 10 TABLET ORAL at 08:11

## 2017-05-21 RX ADMIN — INSULIN LISPRO 2 UNITS: 100 INJECTION, SOLUTION INTRAVENOUS; SUBCUTANEOUS at 16:20

## 2017-05-21 RX ADMIN — AMLODIPINE BESYLATE 5 MG: 5 TABLET ORAL at 08:11

## 2017-05-21 RX ADMIN — ACETAMINOPHEN 650 MG: 325 TABLET, FILM COATED ORAL at 20:45

## 2017-05-21 RX ADMIN — ACETAMINOPHEN 650 MG: 325 TABLET, FILM COATED ORAL at 05:56

## 2017-05-21 RX ADMIN — STANDARDIZED SENNA CONCENTRATE AND DOCUSATE SODIUM 2 TABLET: 8.6; 5 TABLET, FILM COATED ORAL at 08:11

## 2017-05-21 RX ADMIN — ENOXAPARIN SODIUM 40 MG: 100 INJECTION SUBCUTANEOUS at 08:11

## 2017-05-21 RX ADMIN — DIBASIC SODIUM PHOSPHATE, MONOBASIC POTASSIUM PHOSPHATE AND MONOBASIC SODIUM PHOSPHATE 1 TABLET: 852; 155; 130 TABLET ORAL at 13:14

## 2017-05-21 RX ADMIN — DULOXETINE HYDROCHLORIDE 60 MG: 60 CAPSULE, DELAYED RELEASE ORAL at 08:11

## 2017-05-21 RX ADMIN — STANDARDIZED SENNA CONCENTRATE AND DOCUSATE SODIUM 2 TABLET: 8.6; 5 TABLET, FILM COATED ORAL at 20:45

## 2017-05-21 RX ADMIN — TAMSULOSIN HYDROCHLORIDE 0.4 MG: 0.4 CAPSULE ORAL at 08:11

## 2017-05-21 RX ADMIN — DIBASIC SODIUM PHOSPHATE, MONOBASIC POTASSIUM PHOSPHATE AND MONOBASIC SODIUM PHOSPHATE 1 TABLET: 852; 155; 130 TABLET ORAL at 01:31

## 2017-05-21 RX ADMIN — QUETIAPINE FUMARATE 300 MG: 100 TABLET ORAL at 20:45

## 2017-05-21 RX ADMIN — ACETAMINOPHEN 650 MG: 325 TABLET, FILM COATED ORAL at 13:14

## 2017-05-21 ASSESSMENT — PAIN SCALES - GENERAL
PAINLEVEL_OUTOF10: 3
PAINLEVEL_OUTOF10: 1
PAINLEVEL_OUTOF10: 3

## 2017-05-21 NOTE — CARE PLAN
Problem: Communication  Goal: The ability to communicate needs accurately and effectively will improve  Outcome: PROGRESSING AS EXPECTED  Pt educated on call light use. Pt used call light to call for assist with toileting.     Problem: Skin Integrity  Goal: Risk for impaired skin integrity will decrease  Outcome: PROGRESSING AS EXPECTED  Pt experiencing blanching of skin around sacral area. Educated on why it was occuring, interventions in place to prevent worsening of symptoms, and importance of turning and mobility. Pt stated he understood teaching. Staff will continue to reinforce.

## 2017-05-21 NOTE — PROGRESS NOTES
Hospital Medicine Progress Note, Adult, Complex               Author: Janet Wheeler Date & Time created: 2017  12:45 PM     Interval History:  The patient was admitted after a fall with an acute left femoral neck fracture    Remains Out of restraints  Confused but cooperative, not yelling out or picking at sheets etc.    Review of Systems:  Review of Systems   Unable to perform ROS: dementia       Physical Exam:  Physical Exam   Constitutional: He appears well-developed. No distress.   HENT:   Mouth/Throat: No oropharyngeal exudate.   Eyes: Conjunctivae are normal. No scleral icterus.   Neck: Neck supple.   Cardiovascular: Normal rate.    No murmur heard.  Pulmonary/Chest: Effort normal and breath sounds normal.   Abdominal: Soft. Bowel sounds are normal.   Musculoskeletal: He exhibits no edema.   Neurological: He is disoriented.   Skin: Skin is dry. No rash noted. He is not diaphoretic. There is pallor.   Psychiatric: He has a normal mood and affect. He is slowed. Cognition and memory are impaired.   Nursing note and vitals reviewed.      Labs:        Invalid input(s): [id number]      No results for input(s): SODIUM, POTASSIUM, CHLORIDE, CO2, BUN, CREATININE, MAGNESIUM, PHOSPHORUS, CALCIUM in the last 72 hours.  No results for input(s): ALTSGPT, ASTSGOT, ALKPHOSPHAT, TBILIRUBIN, DBILIRUBIN, GAMMAGT, AMYLASE, LIPASE, ALB, PREALBUMIN, GLUCOSE in the last 72 hours.  No results for input(s): RBC, HEMOGLOBIN, HEMATOCRIT, PLATELETCT, PROTHROMBTM, APTT, INR, IRON, FERRITIN, TOTIRONBC in the last 72 hours.            Hemodynamics:  Temp (24hrs), Av.8 °C (98.2 °F), Min:36.5 °C (97.7 °F), Max:37 °C (98.6 °F)  Temperature: 36.7 °C (98.1 °F)  Pulse  Av.8  Min: 67  Max: 130   Blood Pressure : (!) 168/65 mmHg     Respiratory:    Respiration: 16, Pulse Oximetry: 95 %        RUL Breath Sounds: Clear, RML Breath Sounds: Clear, RLL Breath Sounds: Diminished, PIO Breath Sounds: Clear, LLL Breath Sounds:  Diminished  Fluids:    Intake/Output Summary (Last 24 hours) at 05/21/17 1245  Last data filed at 05/21/17 0400   Gross per 24 hour   Intake    480 ml   Output   1600 ml   Net  -1120 ml        GI/Nutrition:  Orders Placed This Encounter   Procedures   • DIET ORDER     Standing Status: Standing      Number of Occurrences: 1      Standing Expiration Date:      Order Specific Question:  Diet:     Answer:  Regular [1]     Order Specific Question:  Texture/Fiber modifications:     Answer:  Dysphagia 1(Pureed)specify fluid consistency(question 6) [1]      Comments:  per SLP, ok for dysphagia 2 diet but pt prefers dysphagia 1     Order Specific Question:  Consistency/Fluid modifications:     Answer:  Nectar Thick [2]     Medical Decision Making, by Problem:  Active Hospital Problems    Diagnosis   • Hypertension [I10] blood pressure controlled on lisinopril and norvasc   • Hypothyroidism [E03.9] sytnthroid   • Depression [F32.9] cymbalta    • Anemia, blood loss [D50.0] hemoglobin stable, no need for transfusion   • Iron deficiency [E61.1] oral iron given   • Hyponatremia [E87.1] improving   • Femur fracture, left (CMS-[facility]) [S72.92XA]repaired, continue physical therapy   • Controlled type 2 diabetes mellitus without complication, without long-term current use of insulin (CMS-HCC) [E11.9] controlled   • Squamous cell cancer of tongue (CMS-HCC) [C02.9]   • GERD (gastroesophageal reflux disease) [K21.9] pepcid   • Anxiety [F41.9]   Dementia: severe, with coprophagia improved behavior  Continue seroquel  Valproate ER    SNF placement pending bed availability  Discussed with RN and   DNR    Labs reviewed and Medications reviewed  Mathis catheter: No Mathis      DVT Prophylaxis: Enoxaparin (Lovenox)    Ulcer prophylaxis: Yes    Assessed for rehab: Patient was assess for and/or received rehabilitation services during this hospitalization

## 2017-05-21 NOTE — PROGRESS NOTES
Orthopaedic Progress Note    Interval changes:  Dressing CDI  Staple removal day of DC to SNF  Cleared for DC to SNF pending medicine clearance    ROS - Patient denies any new issues.  Pain well controlled.    Blood pressure 168/65, pulse 85, temperature 36.7 °C (98.1 °F), resp. rate 16, height 1.829 m (6'), weight 59 kg (130 lb 1.1 oz), SpO2 95 %.      Patient seen and examined  No acute distress  Breathing non labored  RRR  Left hip dressing CDI.  Patient clearly fires tibialis anterior, EHL, and gastrocnemius/soleus. Sensation is intact to light touch throughout superficial peroneal, deep peroneal, tibial, saphenous, and sural nerve distributions. Strong and palpable 2+ dorsalis pedis and posterior tibial pulses with capillary refill less than 2 seconds. No lower leg tenderness or discomfort.      Active Hospital Problems    Diagnosis   • Hypertension [I10]     Priority: Low   • Hypothyroidism [E03.9]     Priority: Low   • Depression [F32.9]     Priority: Low   • Anemia, blood loss [D50.0]   • Iron deficiency [E61.1]   • Hyponatremia [E87.1]   • Femur fracture, left (CMS-HCC) [S72.92XA]   • Controlled type 2 diabetes mellitus without complication, without long-term current use of insulin (CMS-HCC) [E11.9]   • Squamous cell cancer of tongue (CMS-HCC) [C02.9]   • GERD (gastroesophageal reflux disease) [K21.9]   • Anxiety [F41.9]       Assessment/Plan:  Cleared for DC by ortho for SNF DC pending medicine clearance  POD#10 S/P Surgical treatment of left basicervical femoral neck fracture with dynamic hip screw and sideplate  Wt bearing status - WBAT LLE  Wound care/Drains - dressing changed every other day by nursing  Future Procedures - none planned   Lovenox: Start 5/12, Duration-until ambulatory > 150'  Sutures/Staples out- 10-14 days post operatively  PT/OT-initiated  Antibiotics: completed  DVT Prophylaxis- TEDS/SCDs/Foot pumps  Mathis-in place   Case Coordination for Discharge Planning - Disposition SNF

## 2017-05-21 NOTE — PROGRESS NOTES
Pt resting comfortably in bed. All current needs met at this time. Pt had one BM; was cleaned with soap and water, barrier cream applied, and repositioned in the bed. Will continue to monitor throughout shift.

## 2017-05-21 NOTE — DISCHARGE PLANNING
Received call from Angelica at [place], now states they have declined patient due to Behaviors. CCT Kathryn advised.

## 2017-05-21 NOTE — DISCHARGE PLANNING
Per Carlos at [facility], they are not accepting any patients today due to staffing.  Spoke with Angelica at [place] they have accepted patient, but are unable to locate referral.  Referral has been resent at 0915 on 05-21-17.

## 2017-05-21 NOTE — PROGRESS NOTES
AOx1. Oriented to self. Confused.  Denies SOB. Denies nausea. Denies pain. Pain when moving. Declines pain meds at this time. Does not call appropriately. Bed alarm on. Denies numbing or tingling on extremities. Pedal pulses +1. Mathis catheter draining yellow urine. SCDs on. Sleeping. No BM tonight.

## 2017-05-21 NOTE — CARE PLAN
Problem: Safety  Goal: Will remain free from injury  Outcome: PROGRESSING AS EXPECTED  Bed alarm on. Bed in low position. Upper rails up. Call light within reach. Hourly rounding. Treaded socks.    Problem: Venous Thromboembolism (VTW)/Deep Vein Thrombosis (DVT) Prevention:  Goal: Patient will participate in Venous Thrombosis (VTE)/Deep Vein Thrombosis (DVT)Prevention Measures  Outcome: PROGRESSING AS EXPECTED  SCD on.

## 2017-05-21 NOTE — DISCHARGE PLANNING
Medical Social Work    SW placed call to [facility] regarding [facility] bed availability. Per [facility], [facility] is unable to accept pt today due to staffing.

## 2017-05-22 VITALS
BODY MASS INDEX: 17.62 KG/M2 | DIASTOLIC BLOOD PRESSURE: 77 MMHG | HEIGHT: 72 IN | RESPIRATION RATE: 16 BRPM | HEART RATE: 78 BPM | SYSTOLIC BLOOD PRESSURE: 147 MMHG | OXYGEN SATURATION: 96 % | TEMPERATURE: 98.2 F | WEIGHT: 130.07 LBS

## 2017-05-22 LAB
GLUCOSE BLD-MCNC: 109 MG/DL (ref 65–99)
GLUCOSE BLD-MCNC: 136 MG/DL (ref 65–99)
GLUCOSE BLD-MCNC: 156 MG/DL (ref 65–99)
GLUCOSE BLD-MCNC: 198 MG/DL (ref 65–99)

## 2017-05-22 PROCEDURE — A9270 NON-COVERED ITEM OR SERVICE: HCPCS | Performed by: HOSPITALIST

## 2017-05-22 PROCEDURE — A9270 NON-COVERED ITEM OR SERVICE: HCPCS | Performed by: NURSE PRACTITIONER

## 2017-05-22 PROCEDURE — A9270 NON-COVERED ITEM OR SERVICE: HCPCS | Performed by: INTERNAL MEDICINE

## 2017-05-22 PROCEDURE — 97530 THERAPEUTIC ACTIVITIES: CPT

## 2017-05-22 PROCEDURE — 99239 HOSP IP/OBS DSCHRG MGMT >30: CPT | Performed by: HOSPITALIST

## 2017-05-22 PROCEDURE — 700102 HCHG RX REV CODE 250 W/ 637 OVERRIDE(OP): Performed by: NURSE PRACTITIONER

## 2017-05-22 PROCEDURE — 82962 GLUCOSE BLOOD TEST: CPT

## 2017-05-22 PROCEDURE — 700102 HCHG RX REV CODE 250 W/ 637 OVERRIDE(OP): Performed by: INTERNAL MEDICINE

## 2017-05-22 PROCEDURE — 700102 HCHG RX REV CODE 250 W/ 637 OVERRIDE(OP): Performed by: HOSPITALIST

## 2017-05-22 PROCEDURE — 700111 HCHG RX REV CODE 636 W/ 250 OVERRIDE (IP): Performed by: ORTHOPAEDIC SURGERY

## 2017-05-22 PROCEDURE — 97116 GAIT TRAINING THERAPY: CPT

## 2017-05-22 RX ORDER — LISINOPRIL 10 MG/1
10 TABLET ORAL DAILY
Qty: 30 TAB
Start: 2017-05-22 | End: 2017-06-16 | Stop reason: SDUPTHER

## 2017-05-22 RX ORDER — TAMSULOSIN HYDROCHLORIDE 0.4 MG/1
0.4 CAPSULE ORAL
Qty: 30 CAP
Start: 2017-05-22 | End: 2017-11-27

## 2017-05-22 RX ORDER — AMLODIPINE BESYLATE 5 MG/1
5 TABLET ORAL DAILY
Qty: 30 TAB
Start: 2017-05-22 | End: 2017-06-16

## 2017-05-22 RX ORDER — QUETIAPINE FUMARATE 300 MG/1
300 TABLET, FILM COATED ORAL EVERY EVENING
Qty: 60 TAB | Refills: 3
Start: 2017-05-22 | End: 2017-08-17 | Stop reason: SDUPTHER

## 2017-05-22 RX ORDER — DIVALPROEX SODIUM 250 MG/1
250 TABLET, EXTENDED RELEASE ORAL DAILY
Qty: 30 TAB
Start: 2017-05-22 | End: 2017-07-21

## 2017-05-22 RX ADMIN — ACETAMINOPHEN 650 MG: 325 TABLET, FILM COATED ORAL at 15:26

## 2017-05-22 RX ADMIN — STANDARDIZED SENNA CONCENTRATE AND DOCUSATE SODIUM 2 TABLET: 8.6; 5 TABLET, FILM COATED ORAL at 08:34

## 2017-05-22 RX ADMIN — DULOXETINE HYDROCHLORIDE 60 MG: 60 CAPSULE, DELAYED RELEASE ORAL at 08:34

## 2017-05-22 RX ADMIN — ACETAMINOPHEN 650 MG: 325 TABLET, FILM COATED ORAL at 05:37

## 2017-05-22 RX ADMIN — INSULIN LISPRO 2 UNITS: 100 INJECTION, SOLUTION INTRAVENOUS; SUBCUTANEOUS at 11:30

## 2017-05-22 RX ADMIN — FAMOTIDINE 20 MG: 20 TABLET, FILM COATED ORAL at 08:35

## 2017-05-22 RX ADMIN — DIBASIC SODIUM PHOSPHATE, MONOBASIC POTASSIUM PHOSPHATE AND MONOBASIC SODIUM PHOSPHATE 1 TABLET: 852; 155; 130 TABLET ORAL at 05:38

## 2017-05-22 RX ADMIN — DIBASIC SODIUM PHOSPHATE, MONOBASIC POTASSIUM PHOSPHATE AND MONOBASIC SODIUM PHOSPHATE 1 TABLET: 852; 155; 130 TABLET ORAL at 11:31

## 2017-05-22 RX ADMIN — DIVALPROEX SODIUM 250 MG: 250 TABLET, EXTENDED RELEASE ORAL at 08:35

## 2017-05-22 RX ADMIN — LISINOPRIL 10 MG: 10 TABLET ORAL at 08:34

## 2017-05-22 RX ADMIN — DIBASIC SODIUM PHOSPHATE, MONOBASIC POTASSIUM PHOSPHATE AND MONOBASIC SODIUM PHOSPHATE 1 TABLET: 852; 155; 130 TABLET ORAL at 18:05

## 2017-05-22 RX ADMIN — DIBASIC SODIUM PHOSPHATE, MONOBASIC POTASSIUM PHOSPHATE AND MONOBASIC SODIUM PHOSPHATE 1 TABLET: 852; 155; 130 TABLET ORAL at 01:12

## 2017-05-22 RX ADMIN — FERROUS SULFATE TAB 325 MG (65 MG ELEMENTAL FE) 325 MG: 325 (65 FE) TAB at 09:00

## 2017-05-22 RX ADMIN — LEVOTHYROXINE SODIUM 50 MCG: 25 TABLET ORAL at 05:37

## 2017-05-22 RX ADMIN — INSULIN LISPRO 2 UNITS: 100 INJECTION, SOLUTION INTRAVENOUS; SUBCUTANEOUS at 16:40

## 2017-05-22 RX ADMIN — AMLODIPINE BESYLATE 5 MG: 5 TABLET ORAL at 08:35

## 2017-05-22 RX ADMIN — ENOXAPARIN SODIUM 40 MG: 100 INJECTION SUBCUTANEOUS at 08:35

## 2017-05-22 RX ADMIN — TAMSULOSIN HYDROCHLORIDE 0.4 MG: 0.4 CAPSULE ORAL at 08:34

## 2017-05-22 ASSESSMENT — GAIT ASSESSMENTS
DISTANCE (FEET): 20
ASSISTIVE DEVICE: FRONT WHEEL WALKER
GAIT LEVEL OF ASSIST: MAXIMAL ASSIST
DEVIATION: STEP TO;DECREASED BASE OF SUPPORT;BRADYKINETIC;SHUFFLED GAIT

## 2017-05-22 ASSESSMENT — LIFESTYLE VARIABLES: DO YOU DRINK ALCOHOL: NO

## 2017-05-22 ASSESSMENT — COGNITIVE AND FUNCTIONAL STATUS - GENERAL
WALKING IN HOSPITAL ROOM: A LOT
STANDING UP FROM CHAIR USING ARMS: A LOT
MOVING TO AND FROM BED TO CHAIR: A LITTLE
TURNING FROM BACK TO SIDE WHILE IN FLAT BAD: A LITTLE
SUGGESTED CMS G CODE MODIFIER MOBILITY: CL
MOVING FROM LYING ON BACK TO SITTING ON SIDE OF FLAT BED: A LOT
MOBILITY SCORE: 14
CLIMB 3 TO 5 STEPS WITH RAILING: A LOT

## 2017-05-22 ASSESSMENT — PAIN SCALES - GENERAL
PAINLEVEL_OUTOF10: 3
PAINLEVEL_OUTOF10: 3

## 2017-05-22 ASSESSMENT — PAIN SCALES - WONG BAKER: WONGBAKER_NUMERICALRESPONSE: DOESN'T HURT AT ALL

## 2017-05-22 NOTE — DISCHARGE PLANNING
Discussed patient with CCT Donna.  Per NEO Thompson referral sent to all [facility].

## 2017-05-22 NOTE — DISCHARGE PLANNING
Per CCT Donna patient to transfer to [facility] today.  Spoke with Natalie at [facility] they have schedule transport for 1830 per SW.

## 2017-05-22 NOTE — DISCHARGE PLANNING
Medical Social Work    Discharge Plan: Pt is medically cleared to transfer to SNF today. Pt has been accepted to [facility]. NEO called pt's spouse at [phone]. Spouse is agreeable to the transport. Transport arranged for 1830. NEO notified the nurse of the transport time.    Number to call for report: [phone]    Care Transition Team Final Discharge Disposition    Actual Discharge Information  Actual Discharge Date: 05/22/17  Care Transitions Team Assisting with Transportation: Yes  Method of Transportation: Van  Scheduled Transportation Date: 05/22/17  Scheduled Transportation Time: 1830

## 2017-05-22 NOTE — PROGRESS NOTES
Patient comfortable sleeping. No complaints of pain. Voiding via quinteros catheter - clear, yellow urine. Encouraged to drink. Pt hates the thickened water. Denies numbing or tingling. Patient turns self frequently. Bed alarm on. Does not call appropriately.

## 2017-05-22 NOTE — CARE PLAN
Problem: Pain Management  Goal: Pain level will decrease to patient’s comfort goal  Outcome: PROGRESSING AS EXPECTED  Pain controlled with scheduled tylenol.    Problem: Skin Integrity  Goal: Risk for impaired skin integrity will decrease  Outcome: PROGRESSING AS EXPECTED  Pt frequently turns self. Barrier cream applied. Pt kept dry. Heels floated on pillows

## 2017-05-22 NOTE — PROGRESS NOTES
Pt awake, confused at times, sitting up in bed, denies pain, dressing to Left hip CDI, hourly rounds in place, bed alarm on, incontinent to bowel, quinteros catheter intact, yellow clear urine, call light and fluids placed within reach.

## 2017-05-22 NOTE — CARE PLAN
Problem: Pain Management  Goal: Pain level will decrease to patient’s comfort goal  Outcome: PROGRESSING AS EXPECTED  Pain is manageable, declining pain when asked, hourly rounds in place.     Problem: Mobility  Goal: Risk for activity intolerance will decrease  Outcome: PROGRESSING AS EXPECTED  Pt has surgical incision to L hip, dressing intact, no drainage noted. Able to move in bed, poor weight bearing on LLE, when up, working with PT/OT.

## 2017-05-22 NOTE — DISCHARGE INSTRUCTIONS
Discharge Instructions    Discharged to other by [facility] van with self. Discharged via wheelchair, hospital escort: Yes.  Special equipment needed: Not Applicable    Be sure to schedule a follow-up appointment with your primary care doctor or any specialists as instructed.   Pt going to [facility] and will follow MD there.     Discharge Plan:   Influenza Vaccine Indication: Patient Refuses    I understand that a diet low in cholesterol, fat, and sodium is recommended for good health. Unless I have been given specific instructions below for another diet, I accept this instruction as my diet prescription.   Other : Dysphagia (pureed with nectar thick liquids)    Special Instructions: Discharge instructions for the Orthopedic Patient    Follow up with Primary Care Physician within 2 weeks of discharge to home, regarding:  Review of medications and diagnostic testing.  Surveillance for medical complications.  Workup and treatment of osteoporosis, if appropriate.     -Is this a Joint Replacement patient? Yes   Total Joint Hip Replacement Discharge Instructions    Pain  - The goal is to slowly wean off the prescription pain medicine.  - Ice can be used for pain control.  20 minutes at a time is recommended, and never directly against your skin or incision.  - Most patients are off the pain pills by 3 weeks; others may require a low level of pain medications for many months. If your pain continues to be severe, follow up with your physician.  Infection  Deep hip joint infections that require removal of the prostheses occur in less than 0.1% of patients. Lesser infections in the skin (cellulites) are more common and much more easily treated.  - Keep the incision as clean and dry as possible.  - Always wash your hands before touching your incision.  - Skin infections tend to develop around 7-10 days after surgery, most can be treated with oral antibiotics.  - Dental Care should be delayed for 3 months after  surgery, your surgeon recommends taking a dose of antibiotics 1 hour prior to any dental procedure.  After 2 years, most surgeons recommend antibiotics only before an extensive procedure.  Ask your surgeon what he recommends.  - Signs and symptoms of infection can include:  low grade fever, redness, pain, swelling and drainage from your incision.  Notify your surgeon immediately if you develop any of these symptoms.  Post op Disturbances  - Bowel habits - constipation is extremely common and is caused by a combination of anesthesia, lack of mobility and pain medicine.  Use stool softeners or laxatives if necessary. It is important not to ignore this problem, as bowel obstructions can be a serious complication after joint replacement surgery.  - Mood/Energy Level - Many patients experience a lack of energy and endurance for up to 2-3 months after surgery.  Some may also feel down and can even become depressed.  This is likely due to the postoperative anemia, change in activity level, lack of sleep, pain medicine and just the emotional reaction to the surgery itself that is a big disruption in a person’s life.  This usually passes.  If symptoms persist, follow up with your primary physician.  - Returning to work - Your surgeon will give you more specific instructions.  Generally, if you work a sedentary job requiring little standing or walking, most patients may return within 2-6 weeks.  Manual labor jobs involving walking, lifting and standing may take 3-4 months.  Your surgeon’s office can provide a release to part-time or light duty work early on in your recovery and progress you to full duty as able.  - Driving - You can begin driving an automatic shift car in 4 to 8 weeks, provided you are no longer taking narcotic pain medication. If you have a stick-shift car and your right hip was replaced, do not begin driving until your doctor says you can.   - Avoiding falls -  throw rugs and tack down loose  carpeting.  Be aware of floor hazards such as pets, small objects or uneven surfaces.   -  Airport Metal Detectors - The sensitivity of metal detectors varies and it is likely that your prosthesis will cause an alarm. Inform the  that you have an artificial joint.  Diet  - Resume your normal diet as tolerated.  - It is important to achieve a healthy nutritional status by eating a well balanced diet on a regular basis.  - Your physician may recommend that you take iron and vitamin supplements.   - Continue to drink plenty of fluids.  Shower/Bathing  - You may shower as soon as you get home from the hospital unless otherwise instructed.  - Keep your incision out of water.  To keep the incision dry when showering, cover it with a plastic bag or plastic wrap.  - Pat incision dry if it gets wet.  Don’t rub.  - Do not submerge in a bath until staples are out and the incision is completely healed. (Approximately 6-8 weeks after surgery).  Dressing Change:  Procedure (if recommended by your physician)  - Wash hands.  - Open all dressing change materials.  - Remove old dressing and discard.  - Inspect incision for redness, increase in clear drainage, yellow/green drainage, odor and surrounding skin hot to touch.  -  ABD (large gauze) pad by one corner and lay over the incision.  Be careful not to touch the inside of the dressing that will lay over the incision.  - Secure in place as instructed (Ace wrap or tape).    Swelling/Bruising  - Swelling is normal after hip replacement and can involve the thigh, knee, calf and foot.  - Swelling can last from 3-6 months.  - Elevate your leg higher than your heart while reclining.  The first week you are home you should elevate your leg an equal amount of time, as you are active.    - Anti-inflammatory pills can be taken once you have stopped the blood thinners.  - The swelling is usually worse after you go home since you are upright for longer periods of  time.  - Bruising is common and can involve the entire leg including the thigh, calf and even foot.  Bruising often does not appear until after you arrive home and it can be quite dramatic- purple, black, green.  The bruising you can see is not usually concerning and will subside without any treatment.      Blood Clot Prevention  Blood clots in the legs and the less common, but frightening, clots that travel to the lungs are a real focus of our preventative. Most patients are at standard risk for them, but those patients who are at higher risk include people who have had previous clots, a family history of clotting, smoking, diabetes, obesity, advanced age, use of estrogen and a sedentary lifestyle.    - Signs of blood clots in legs - Swelling in thigh, calf or ankle that does not go down with elevation.  Pain, heat and tenderness in calf, back of calf or groin area.  NOTE: blood clots can occur in either leg.  - You have been receiving anticoagulant therapy (blood thinners) in the hospital and you may be instructed to continue at home depending on your risk factors.  - Your risk for developing a clot continues for up to 2-3 months after surgery.  You should avoid prolonged sitting and dehydration during that time (long air trips and car trips).  If you do take a trip during this time, please get up and move around every 1- 1.5 hours.  - If you are prescribed blood thinning medication for home, follow instructions as directed. (Handouts provided if applicable).      Activity    Once you get home, you should stay active. The key is not to overdo it! While you can expect some good days and some bad days, you should notice a gradual improvement over time you should notice a gradual improvement and a gradual increase in your endurance over the next 6 to 12 months.    - Weight Bearing - If you have undergone cemented or hybrid hip replacement, you can put some weight on the leg immediately using a cane or walker, and you  should continue to use some support for 4 to 6 weeks to help the muscles recover.   - Sleeping Positions - Sleep on your back with your legs slightly apart or on your side with a regular pillow between your knees. Be sure to use the pillow for at least 6 weeks, or until your doctor says you can do without it. Sleeping on your stomach should be all right  - Sitting - For at least the first 3 months, sit only in chairs that have arms. Do not sit on low chairs, low stools, or reclining chairs. Do not cross your legs at the knees. The physical therapist will show you how to sit and stand from a chair, keeping your affected leg out in front of you. Get up and move around on a regular basis--at least once every hour.  - Walking - Walk as much as you like once your doctor gives you the go-ahead, but remember that walking is no substitute for your prescribed exercises. Walking with a pair of trekking poles is helpful and adds as much as 40% to the exercise you get when you walk  - Therapy may be needed in some cases, to strengthen your muscles and improve your gait (walking pattern).  This decision will be made at your post-operative appointment.  Follow your therapist recommended post-operative exercises (handout provided by Therapist).  - Swimming is also recommended; you can begin as soon as the sutures have been removed and the wound is healed, approximately 6 to 8 weeks after surgery. Using a pair of training fins may make swimming a more enjoyable and effective exercise.  - Other activities - Lower impact activities are preferred.  If you have specific questions, consult your Surgeon.    - Sexual activity - Your surgeon can tell you when it should be safe to resume sexual activity.      When to Call the Doctor   Call the physician if:   - Fever over 100.5? F  - Increased pain, drainage, redness, odor or heat around the incision area  - Shaking chills  - Increased knee pain with activity and rest  - Increased pain in  calf, tenderness or redness above or below the knee  - Increased swelling of calf, ankle, foot  - Sudden increased shortness of breath, sudden onset of chest pain, localized chest pain with coughing  - Incision opening  Or, if there are any questions or concerns about medications or care.       -Is this patient being discharged with medication to prevent blood clots?  No    · Is patient discharged on Warfarin / Coumadin?   No     · Is patient Post Blood Transfusion?  No    Depression / Suicide Risk    As you are discharged from this [facility] facility, it is important to learn how to keep safe from harming yourself.    Recognize the warning signs:  · Abrupt changes in personality, positive or negative- including increase in energy   · Giving away possessions  · Change in eating patterns- significant weight changes-  positive or negative  · Change in sleeping patterns- unable to sleep or sleeping all the time   · Unwillingness or inability to communicate  · Depression  · Unusual sadness, discouragement and loneliness  · Talk of wanting to die  · Neglect of personal appearance   · Rebelliousness- reckless behavior  · Withdrawal from people/activities they love  · Confusion- inability to concentrate     If you or a loved one observes any of these behaviors or has concerns about self-harm, here's what you can do:  · Talk about it- your feelings and reasons for harming yourself  · Remove any means that you might use to hurt yourself (examples: pills, rope, extension cords, firearm)  · Get professional help from the community (Mental Health, Substance Abuse, psychological counseling)  · Do not be alone:Call your Safe Contact- someone whom you trust who will be there for you.  · Call your local CRISIS HOTLINE 318-9823 or 509-849-6472  · Call your local Children's Mobile Crisis Response Team [place] (916) 617-2039 or www.iTracs  · Call the toll free National Suicide Prevention Hotlines   · National  Suicide Prevention Lifeline 490-600-UJFI (9915)  · National [place] Line Network 800-SUICIDE (594-8525)

## 2017-05-22 NOTE — DISCHARGE PLANNING
Medical Social Work    1400: Per [facility], [facility] has accepted the pt and have a bed available for him today. NEO called pt's spouse at [phone]. Pt's spouse to call Margarita from [facility].     1445: NEO called pt's spouse at [phone]. Pt is agreeable to having the pt transferred to [facility].    NEO faxed over transportation communication form to [facility].

## 2017-05-22 NOTE — DISCHARGE PLANNING
Received call from Natalie at [facility] they will accept patient. CCT Donna advised.

## 2017-05-22 NOTE — THERAPY
"Pt making gradual progress toward goals. Left lateral and posterior lean in standing is most concerning. Unable to consistently get torso over RUSSEL to stabilize with FWW. Able to follow 1 -2 step commands. will continue to benefit from acute skilled PT services.     Physical Therapy Treatment completed.   Bed Mobility:  Supine to Sit: Minimal Assist (to torso)  Transfers: Sit to Stand: Minimal Assist  Gait: Level Of Assist: Maximal Assist with Front-Wheel Walker   x20 ft    Plan of Care: Will benefit from Physical Therapy 3 times per week  Discharge Recommendations: Equipment: Will Continue to Assess for Equipment Needs. Post-acute therapy Discharge to a transitional care facility for continued skilled therapy services.     See \"Rehab Therapy-Acute\" Patient Summary Report for complete documentation.       "

## 2017-05-22 NOTE — PROGRESS NOTES
Hospital Medicine Progress Note, Adult, Complex               Author: Janet Wheeler Date & Time created: 2017  1:48 PM     Interval History:  The patient was admitted after a fall with an acute left femoral neck fracture    Remains Out of restraints  Confused but cooperative, not yelling out or picking at sheets etc.    Review of Systems:  Review of Systems   Unable to perform ROS: dementia       Physical Exam:  Physical Exam   Constitutional: He appears well-developed. No distress.   HENT:   Mouth/Throat: No oropharyngeal exudate.   Eyes: Conjunctivae are normal. No scleral icterus.   Neck: Neck supple.   Cardiovascular: Normal rate.    No murmur heard.  Pulmonary/Chest: Effort normal and breath sounds normal.   Abdominal: Soft. Bowel sounds are normal.   Musculoskeletal: He exhibits no edema.   Neurological: He is disoriented.   Skin: Skin is dry. No rash noted. He is not diaphoretic. There is pallor.   Psychiatric: He has a normal mood and affect. He is slowed. Cognition and memory are impaired.   Nursing note and vitals reviewed.      Labs:        Invalid input(s): [id number]      No results for input(s): SODIUM, POTASSIUM, CHLORIDE, CO2, BUN, CREATININE, MAGNESIUM, PHOSPHORUS, CALCIUM in the last 72 hours.  No results for input(s): ALTSGPT, ASTSGOT, ALKPHOSPHAT, TBILIRUBIN, DBILIRUBIN, GAMMAGT, AMYLASE, LIPASE, ALB, PREALBUMIN, GLUCOSE in the last 72 hours.  No results for input(s): RBC, HEMOGLOBIN, HEMATOCRIT, PLATELETCT, PROTHROMBTM, APTT, INR, IRON, FERRITIN, TOTIRONBC in the last 72 hours.            Hemodynamics:  Temp (24hrs), Av.7 °C (98.1 °F), Min:36.6 °C (97.8 °F), Max:36.9 °C (98.4 °F)  Temperature: 36.8 °C (98.2 °F)  Pulse  Av.3  Min: 67  Max: 130   Blood Pressure : 147/77 mmHg     Respiratory:    Respiration: 16, Pulse Oximetry: 96 %        RUL Breath Sounds: Clear, RML Breath Sounds: Diminished, RLL Breath Sounds: Diminished, PIO Breath Sounds: Clear, LLL Breath Sounds:  Diminished  Fluids:    Intake/Output Summary (Last 24 hours) at 05/22/17 1348  Last data filed at 05/22/17 0400   Gross per 24 hour   Intake      0 ml   Output    800 ml   Net   -800 ml        GI/Nutrition:  Orders Placed This Encounter   Procedures   • DIET ORDER     Standing Status: Standing      Number of Occurrences: 1      Standing Expiration Date:      Order Specific Question:  Diet:     Answer:  Regular [1]     Order Specific Question:  Texture/Fiber modifications:     Answer:  Dysphagia 1(Pureed)specify fluid consistency(question 6) [1]      Comments:  per SLP, ok for dysphagia 2 diet but pt prefers dysphagia 1     Order Specific Question:  Consistency/Fluid modifications:     Answer:  Nectar Thick [2]     Medical Decision Making, by Problem:  Active Hospital Problems    Diagnosis   • Hypertension [I10] blood pressure controlled on lisinopril and norvasc   • Hypothyroidism [E03.9] sytnthroid   • Depression [F32.9] cymbalta    • Anemia, blood loss [D50.0] hemoglobin stable, no need for transfusion   • Iron deficiency [E61.1] oral iron given   • Hyponatremia [E87.1] improving   • Femur fracture, left (CMS-HCC) [S72.92XA]repaired, continue physical therapy   • Controlled type 2 diabetes mellitus without complication, without long-term current use of insulin (CMS-HCC) [E11.9] controlled   • Squamous cell cancer of tongue (CMS-HCC) [C02.9]   • GERD (gastroesophageal reflux disease) [K21.9] pepcid   • Anxiety [F41.9]   Dementia: severe, with coprophagia improved behavior, has not had stool picking behavior over 72 hours.  Continue seroquel  Valproate ER    SNF placement pending bed availability  Discussed with RN and   DNR    Labs reviewed and Medications reviewed  Mathis catheter: No Mathis      DVT Prophylaxis: Enoxaparin (Lovenox)    Ulcer prophylaxis: Yes    Assessed for rehab: Patient was assess for and/or received rehabilitation services during this hospitalization

## 2017-05-22 NOTE — PROGRESS NOTES
Received order to remove quinteros catheter out, pt had 500ml in catheter, quinteros was discontinued, tip intact, pt tolerated well.

## 2017-05-22 NOTE — DISCHARGE PLANNING
Medical Social Work    SW spoke with Tanna from [facility] who reported that they do no have any beds available for the pt today. They may have a bed for the pt tomorrow.

## 2017-05-22 NOTE — DISCHARGE PLANNING
Received notice from [facility], they have declined patient due to behaviors.

## 2017-05-22 NOTE — PROGRESS NOTES
Orthopaedic Progress Note    Interval changes:  Dressing CDI  Staples removed  Cleared for DC to SNF later today    ROS - Patient denies any new issues.  Pain well controlled.    Blood pressure 147/77, pulse 78, temperature 36.8 °C (98.2 °F), resp. rate 16, height 1.829 m (6'), weight 59 kg (130 lb 1.1 oz), SpO2 96 %.      Patient seen and examined  No acute distress  Breathing non labored  RRR  Left hip staples removed and seristrips applied with benzoin, surgical incision is well approximated and is dry and clean.  There is no erythema, induration, or signs of infection.  Patient clearly fires tibialis anterior, EHL, and gastrocnemius/soleus. Sensation is intact to light touch throughout superficial peroneal, deep peroneal, tibial, saphenous, and sural nerve distributions. Strong and palpable 2+ dorsalis pedis and posterior tibial pulses with capillary refill less than 2 seconds. No lower leg tenderness or discomfort.      Active Hospital Problems    Diagnosis   • Hypertension [I10]     Priority: Low   • Hypothyroidism [E03.9]     Priority: Low   • Depression [F32.9]     Priority: Low   • Anemia, blood loss [D50.0]   • Iron deficiency [E61.1]   • Hyponatremia [E87.1]   • Femur fracture, left (CMS-[facility]) [S72.92XA]   • Controlled type 2 diabetes mellitus without complication, without long-term current use of insulin (CMS-HCC) [E11.9]   • Squamous cell cancer of tongue (CMS-HCC) [C02.9]   • GERD (gastroesophageal reflux disease) [K21.9]   • Anxiety [F41.9]       Assessment/Plan:  Cleared for DC later today  Staples removed  POD#11 S/P Surgical treatment of left basicervical femoral neck fracture with dynamic hip screw and sideplate  Wt bearing status - WBAT LLE  Wound care/Drains -open to air  Future Procedures - none planned   Lovenox: Start 5/12, Duration-until ambulatory > 150'  Sutures/Staples out- 10-14 days post operatively  PT/OT-initiated  Antibiotics: completed  DVT Prophylaxis- TEDS/SCDs/Foot  pumps  Mathis-in place   Case Coordination for Discharge Planning - Disposition SNF

## 2017-05-22 NOTE — DISCHARGE SUMMARY
CHIEF COMPLAINT ON ADMISSION  Chief Complaint   Patient presents with   • T-5000 Extremity Pain     BIB [facility] from home.  Pt fell at home about 2200.  C/O left hip pain, unable to bear weight       CODE STATUS  DNR    HPI & HOSPITAL COURSE  HISTORY OF PRESENT ILLNESS:  This is an 83-year-old male with a past medical    history significant for hypothyroidism, GERD, type 2 diabetes, squamous cell    carcinoma of tongue, who was brought in by EMS after he had a ground level    fall and could not bear weight on his left hip.  Patient has advanced dementia   and is unable to provide history.  His history is obtained from ER    physician's note and ER staff.  The patient is alert, awake, but oriented to    self only.  He cannot answer any questions for me at present time.  Patient on   imaging studies, was noted to have a left proximal femur fracture     HOSPITAL COURSE: The patient was taken to the operating room by Dr. Pantoja at the time of admission. He tolerated the procedure well. His hospital course was complicated by agitated delirium and hypertension. The patient was started on lisinopril and amlodipine with excellent control of his blood pressure resulting. He has a history of severe dementia and was already on seroquel and cymbalta prior to admission. I increased his seroquel to 300mg nightly and added valproate ER with excellent results. The patient is calm and cooperative and back to his baseline for his dementia.    Therefore, he is discharged in good and stable condition for further post-acute management.     SPECIFIC OUTPATIENT FOLLOW-UP  Orthopedics after discharge.    DISCHARGE PROBLEM LIST  Active Problems:    Depression POA: Yes    Hypothyroidism POA: Yes    Hypertension POA: Yes    Anxiety POA: Yes    GERD (gastroesophageal reflux disease) POA: Yes    Squamous cell cancer of tongue (CMS-HCC) POA: Yes    Controlled type 2 diabetes mellitus without complication, without long-term current use of insulin  (CMS-HCC) POA: Yes    Femur fracture, left (CMS-HCC) POA: Yes    Anemia, blood loss POA: Yes    Iron deficiency POA: Yes    Hyponatremia POA: Yes  Resolved Problems:    * No resolved hospital problems. *      FOLLOW UP  Future Appointments  Date Time Provider Department Center   6/16/2017 9:40 AM GARCIA Valentine [facility] None   7/17/2017 10:15 AM Giovani Sanders M.D. [facility] None     No follow-up provider specified.    MEDICATIONS ON DISCHARGE   Santosh Casas   Home Medication Instructions MIGUEL:[id number]    Printed on:05/22/17 5574   Medication Information                      amlodipine (NORVASC) 5 MG Tab  Take 1 Tab by mouth every day.             divalproex ER (DEPAKOTE ER) 250 MG TABLET SR 24 HR  Take 1 Tab by mouth every day.             docusate sodium (COLACE) 100 MG Cap  Take 1 Cap by mouth 2 times a day.             duloxetine (CYMBALTA) 60 MG Cap DR Particles delayed-release capsule  Take 1 Cap by mouth every day.             famotidine (PEPCID) 20 MG Tab  Take 1 Tab by mouth 2 times a day.             ferrous sulfate 325 (65 FE) MG tablet  Take 1 Tab by mouth every day. With 250 mg vit C and a stool softner such as colace             levothyroxine (SYNTHROID) 50 MCG Tab  Take 1 Tab by mouth Every morning on an empty stomach.             lisinopril (PRINIVIL) 10 MG Tab  Take 1 Tab by mouth every day.             oxycodone immediate release (ROXICODONE) 10 MG immediate release tablet  Take 1 Tab by mouth every four hours as needed for Severe Pain.             psyllium (METAMUCIL) 58.12 % Pack  Take 1 Packet by mouth every day.             quetiapine (SEROQUEL) 300 MG tablet  Take 1 Tab by mouth every evening.             tamsulosin (FLOMAX) 0.4 MG capsule  Take 1 Cap by mouth ONE-HALF HOUR AFTER BREAKFAST.                 DIET  Orders Placed This Encounter   Procedures   • DIET ORDER     Standing Status: Standing      Number of Occurrences: 1      Standing Expiration Date:      Order  Specific Question:  Diet:     Answer:  Regular [1]     Order Specific Question:  Texture/Fiber modifications:     Answer:  Dysphagia 1(Pureed)specify fluid consistency(question 6) [1]      Comments:  per SLP, ok for dysphagia 2 diet but pt prefers dysphagia 1     Order Specific Question:  Consistency/Fluid modifications:     Answer:  Nectar Thick [2]       ACTIVITY  As tolerated and directed by skilled nursing.      LINES, DRAINS, AND WOUNDS  This is an automated list. Peripheral IVs will be removed prior to discharge.     Urinary Catheter [address] 12  (Active)   Catheter State [place] 5/22/2017  8:10 AM   Skin Integrity Intact 5/22/2017  8:10 AM   Catheter Secured Yes 5/22/2017  8:10 AM   Collection Device Changed Yes 5/22/2017  8:10 AM      Surgical Incision  Incision Left Hip (Active)   Wound Bed Other (comment) 5/22/2017  8:10 AM   Drainage  None 5/22/2017  8:10 AM   Periwound Skin Normal;Intact 5/22/2017  8:10 AM   Daily - Wound Closure Staples 5/22/2017  8:10 AM   Dressing Options Mepilex 5/22/2017  8:10 AM   Dressing Status / Change Clean;Dry;Intact 5/22/2017  8:10 AM   Daily - Dressing Change Observed 5/22/2017  8:10 AM              Urinary Catheter Coud[address] 12  (Active)   Catheter State [place] 5/22/2017  8:10 AM   Skin Integrity Intact 5/22/2017  8:10 AM   Catheter Secured Yes 5/22/2017  8:10 AM   Collection Device Changed Yes 5/22/2017  8:10 AM        MENTAL STATUS ON TRANSFER  Level of Consciousness: Confused  Orientation : Disoriented to Event, Disoriented to Person, Disoriented to Place  Speech: Expressive Aphasia    CONSULTATIONS  Dr. Juan Daniel Pantoja, Orthopedic surgery    PROCEDURES  DATE OF OPERATION: 5/11/2017      PREOPERATIVE DIAGNOSIS: left basicervical femoral neck fracture    POSTOPERATIVE DIAGNOSIS: Same    PROCEDURE PERFORMED: Surgical treatment of left basicervical femoral neck fracture with dynamic hip screw and sideplate    SURGEON: Juan Daniel Pantoja M.D    LABORATORY  Lab Results   Component  Value Date/Time    SODIUM 138 05/16/2017 03:11 AM    POTASSIUM 3.3* 05/16/2017 03:11 AM    CHLORIDE 102 05/16/2017 03:11 AM    CO2 30 05/16/2017 03:11 AM    GLUCOSE 126* 05/16/2017 03:11 AM    BUN 17 05/16/2017 03:11 AM    CREATININE 0.82 05/16/2017 03:11 AM    GLOM FILT RATE, EST >59 02/24/2010 09:50 AM        Lab Results   Component Value Date/Time    WBC 5.2 05/15/2017 02:36 AM    HEMOGLOBIN 8.5* 05/15/2017 02:36 AM    HEMATOCRIT 25.1* 05/15/2017 02:36 AM    PLATELET COUNT 222 05/15/2017 02:36 AM        Total time of the discharge process exceeds 32 minutes.

## 2017-05-23 NOTE — PROGRESS NOTES
Called [facility] and spoke with SCOTT Valencia, gave telephone report, answered all her questions,  time at 1830 per SW. Pt' spouse aware and informed about pt going to [facility].

## 2017-05-23 NOTE — PROGRESS NOTES
Pt left to [facility], gave report to SCOTT jorgensen at [facility], pt's wife was at bedside.

## 2017-06-07 ENCOUNTER — HOME HEALTH ADMISSION (OUTPATIENT)
Dept: HOME HEALTH SERVICES | Facility: HOME HEALTHCARE | Age: 82
End: 2017-06-07
Payer: MEDICARE

## 2017-06-08 ENCOUNTER — HOME CARE VISIT (OUTPATIENT)
Dept: HOME HEALTH SERVICES | Facility: HOME HEALTHCARE | Age: 82
End: 2017-06-08
Payer: MEDICARE

## 2017-06-08 ENCOUNTER — HOME CARE VISIT (OUTPATIENT)
Dept: HOME HEALTH SERVICES | Facility: HOME HEALTHCARE | Age: 82
End: 2017-06-08

## 2017-06-08 VITALS
DIASTOLIC BLOOD PRESSURE: 60 MMHG | RESPIRATION RATE: 13 BRPM | SYSTOLIC BLOOD PRESSURE: 115 MMHG | WEIGHT: 110 LBS | HEART RATE: 94 BPM | BODY MASS INDEX: 15.4 KG/M2 | TEMPERATURE: 98.1 F | HEIGHT: 71 IN

## 2017-06-08 PROCEDURE — 665001 SOC-HOME HEALTH

## 2017-06-08 PROCEDURE — G0162 HHC RN E&M PLAN SVS, 15 MIN: HCPCS

## 2017-06-08 SDOH — ECONOMIC STABILITY: HOUSING INSECURITY: UNSAFE COOKING RANGE AREA: 0

## 2017-06-08 SDOH — ECONOMIC STABILITY: HOUSING INSECURITY
HOME SAFETY: SMOKE DETECTOR NEEDS BATTERIES TO BE REPLACED.   THEY DO NOT CURRENTLY HAVE A FIRE EXTINGUISHER IN THE HOME  PER SPOUSE, PATIENT IS UNABLE TO GET IN/OUT OF THE SHOWER. SHE PLANS TO GIVE HIM A BED BATH UNTIL FAMILY CAN GET A SHOWER TRANSFER BENCH.   H

## 2017-06-08 SDOH — ECONOMIC STABILITY: HOUSING INSECURITY: UNSAFE APPLIANCES: 0

## 2017-06-08 SDOH — ECONOMIC STABILITY: HOUSING INSECURITY: HOME SAFETY: ALLWAYS AND DOORWAYS ARE VERY NARROW, MAKING IT DIFFICULT TO TURN AND MANEUVER THE MANUAL WHEELCHAIR.

## 2017-06-08 ASSESSMENT — ENCOUNTER SYMPTOMS
POOR JUDGMENT: 1
ADDITIONAL INFORMATION: CHRONIC PAIN
DEBILITATING PAIN: 1
DIFFICULTY THINKING: 1
VOMITING: DENIES
NAUSEA: DENIES

## 2017-06-08 ASSESSMENT — ACTIVITIES OF DAILY LIVING (ADL)
HOME_HEALTH_OASIS: 02
OASIS_M1830: 05
HOME_HEALTH_OASIS: 01

## 2017-06-08 ASSESSMENT — PATIENT HEALTH QUESTIONNAIRE - PHQ9
2. FEELING DOWN, DEPRESSED, IRRITABLE, OR HOPELESS: 00
1. LITTLE INTEREST OR PLEASURE IN DOING THINGS: 00

## 2017-06-10 ENCOUNTER — HOME CARE VISIT (OUTPATIENT)
Dept: HOME HEALTH SERVICES | Facility: HOME HEALTHCARE | Age: 82
End: 2017-06-10
Payer: MEDICARE

## 2017-06-10 VITALS
SYSTOLIC BLOOD PRESSURE: 112 MMHG | DIASTOLIC BLOOD PRESSURE: 62 MMHG | HEART RATE: 88 BPM | TEMPERATURE: 98.1 F | RESPIRATION RATE: 14 BRPM

## 2017-06-10 PROCEDURE — G0151 HHCP-SERV OF PT,EA 15 MIN: HCPCS

## 2017-06-10 ASSESSMENT — ENCOUNTER SYMPTOMS
DIFFICULTY THINKING: 1
DEBILITATING PAIN: 1

## 2017-06-10 ASSESSMENT — ACTIVITIES OF DAILY LIVING (ADL)
IADLS_COMMENTS: <!--EPICS-->SEE OT EVAL<!--EPICE-->
ADLS_COMMENTS: <!--EPICS-->SEE OT EVAL<!--EPICE-->

## 2017-06-12 ENCOUNTER — HOME CARE VISIT (OUTPATIENT)
Dept: HOME HEALTH SERVICES | Facility: HOME HEALTHCARE | Age: 82
End: 2017-06-12
Payer: MEDICARE

## 2017-06-12 VITALS
TEMPERATURE: 97.7 F | RESPIRATION RATE: 16 BRPM | HEART RATE: 88 BPM | SYSTOLIC BLOOD PRESSURE: 105 MMHG | DIASTOLIC BLOOD PRESSURE: 60 MMHG

## 2017-06-12 VITALS
HEART RATE: 84 BPM | TEMPERATURE: 99.9 F | RESPIRATION RATE: 16 BRPM | SYSTOLIC BLOOD PRESSURE: 123 MMHG | DIASTOLIC BLOOD PRESSURE: 59 MMHG

## 2017-06-12 PROCEDURE — G0299 HHS/HOSPICE OF RN EA 15 MIN: HCPCS

## 2017-06-12 PROCEDURE — G0151 HHCP-SERV OF PT,EA 15 MIN: HCPCS

## 2017-06-12 PROCEDURE — E0700 SAFETY EQUIPMENT: HCPCS

## 2017-06-12 ASSESSMENT — ENCOUNTER SYMPTOMS: DIFFICULTY THINKING: 1

## 2017-06-13 ENCOUNTER — HOME CARE VISIT (OUTPATIENT)
Dept: HOME HEALTH SERVICES | Facility: HOME HEALTHCARE | Age: 82
End: 2017-06-13
Payer: MEDICARE

## 2017-06-13 PROCEDURE — G0153 HHCP-SVS OF S/L PATH,EA 15MN: HCPCS

## 2017-06-13 SDOH — ECONOMIC STABILITY: HOUSING INSECURITY: UNSAFE APPLIANCES: 0

## 2017-06-13 SDOH — ECONOMIC STABILITY: HOUSING INSECURITY: UNSAFE COOKING RANGE AREA: 0

## 2017-06-13 ASSESSMENT — ENCOUNTER SYMPTOMS
VOMITING: DENIES
RESPIRATORY SYMPTOMS COMMENTS: NO APPARENT RESPIRATORY DISTRESS
DEPRESSED MOOD: 1
NAUSEA: DENIES
DIFFICULTY THINKING: 1

## 2017-06-14 ENCOUNTER — HOME CARE VISIT (OUTPATIENT)
Dept: HOME HEALTH SERVICES | Facility: HOME HEALTHCARE | Age: 82
End: 2017-06-14
Payer: MEDICARE

## 2017-06-14 VITALS
HEART RATE: 88 BPM | DIASTOLIC BLOOD PRESSURE: 64 MMHG | SYSTOLIC BLOOD PRESSURE: 124 MMHG | TEMPERATURE: 98.5 F | RESPIRATION RATE: 16 BRPM

## 2017-06-14 VITALS
HEART RATE: 88 BPM | SYSTOLIC BLOOD PRESSURE: 112 MMHG | TEMPERATURE: 98.4 F | DIASTOLIC BLOOD PRESSURE: 58 MMHG | RESPIRATION RATE: 16 BRPM

## 2017-06-14 PROCEDURE — G0152 HHCP-SERV OF OT,EA 15 MIN: HCPCS

## 2017-06-15 ENCOUNTER — HOME CARE VISIT (OUTPATIENT)
Dept: HOME HEALTH SERVICES | Facility: HOME HEALTHCARE | Age: 82
End: 2017-06-15
Payer: MEDICARE

## 2017-06-15 VITALS
TEMPERATURE: 98.1 F | RESPIRATION RATE: 16 BRPM | DIASTOLIC BLOOD PRESSURE: 60 MMHG | HEART RATE: 89 BPM | SYSTOLIC BLOOD PRESSURE: 125 MMHG

## 2017-06-15 PROCEDURE — G0180 MD CERTIFICATION HHA PATIENT: HCPCS | Performed by: INTERNAL MEDICINE

## 2017-06-15 PROCEDURE — G0157 HHC PT ASSISTANT EA 15: HCPCS

## 2017-06-15 PROCEDURE — G0496 LPN CARE TRAIN/EDU IN HH: HCPCS

## 2017-06-15 ASSESSMENT — ACTIVITIES OF DAILY LIVING (ADL)
HOUSEKEEPING_ASSISTANCE: 6
LAUNDRY_ASSISTANCE: 6
TELEPHONE_ASSISTANCE: 6
TOILETING_ASSISTANCE: 5
GROOMING_ASSISTANCE: 5
TRANSPORTATION_ASSISTANCE: 6
BATHING_ASSISTANCE: 6
ORAL_CARE_ASSISTANCE: 5
MEAL_PREP_ASSISTANCE: 6
DRESSING_UB_ASSISTANCE: 5
EATING_ASSISTANCE: 0
SHOPPING_ASSISTANCE: 6
BATHING_ASSISTIVE_EQUIPMENT_NEEDED: TUB TRANSFER BENCH
DRESSING_LB_ASSISTANCE: 6

## 2017-06-15 ASSESSMENT — ENCOUNTER SYMPTOMS: DIFFICULTY THINKING: 1

## 2017-06-16 ENCOUNTER — OFFICE VISIT (OUTPATIENT)
Dept: MEDICAL GROUP | Facility: PHYSICIAN GROUP | Age: 82
End: 2017-06-16
Payer: MEDICARE

## 2017-06-16 VITALS
WEIGHT: 110 LBS | BODY MASS INDEX: 15.4 KG/M2 | DIASTOLIC BLOOD PRESSURE: 62 MMHG | TEMPERATURE: 98.2 F | SYSTOLIC BLOOD PRESSURE: 110 MMHG | HEART RATE: 72 BPM | RESPIRATION RATE: 14 BRPM | OXYGEN SATURATION: 98 % | HEIGHT: 71 IN

## 2017-06-16 DIAGNOSIS — F32.89 OTHER DEPRESSION: ICD-10-CM

## 2017-06-16 DIAGNOSIS — K21.9 GASTROESOPHAGEAL REFLUX DISEASE, ESOPHAGITIS PRESENCE NOT SPECIFIED: ICD-10-CM

## 2017-06-16 DIAGNOSIS — D50.0 ANEMIA, BLOOD LOSS: ICD-10-CM

## 2017-06-16 DIAGNOSIS — M25.552 PAIN IN LEFT HIP: ICD-10-CM

## 2017-06-16 DIAGNOSIS — E03.9 ACQUIRED HYPOTHYROIDISM: ICD-10-CM

## 2017-06-16 DIAGNOSIS — S72.002S CLOSED FRACTURE OF NECK OF LEFT FEMUR, SEQUELA: ICD-10-CM

## 2017-06-16 DIAGNOSIS — K59.03 DRUG-INDUCED CONSTIPATION: ICD-10-CM

## 2017-06-16 DIAGNOSIS — F41.9 ANXIETY: ICD-10-CM

## 2017-06-16 DIAGNOSIS — I10 ESSENTIAL HYPERTENSION: ICD-10-CM

## 2017-06-16 DIAGNOSIS — F03.91 DEMENTIA WITH BEHAVIORAL DISTURBANCE, UNSPECIFIED DEMENTIA TYPE: ICD-10-CM

## 2017-06-16 PROCEDURE — 99215 OFFICE O/P EST HI 40 MIN: CPT | Performed by: NURSE PRACTITIONER

## 2017-06-16 RX ORDER — FAMOTIDINE 20 MG/1
20 TABLET, FILM COATED ORAL 2 TIMES DAILY
Qty: 60 TAB | Refills: 3 | Status: SHIPPED | OUTPATIENT
Start: 2017-06-16 | End: 2017-07-07 | Stop reason: SDUPTHER

## 2017-06-16 RX ORDER — LISINOPRIL 10 MG/1
10 TABLET ORAL DAILY
Qty: 90 TAB | Refills: 3 | Status: SHIPPED | OUTPATIENT
Start: 2017-06-16 | End: 2017-07-07 | Stop reason: SDUPTHER

## 2017-06-16 RX ORDER — AMLODIPINE BESYLATE 5 MG/1
5 TABLET ORAL DAILY
Qty: 90 TAB | Refills: 3 | Status: SHIPPED | OUTPATIENT
Start: 2017-06-16 | End: 2017-07-07 | Stop reason: SDUPTHER

## 2017-06-16 RX ORDER — LEVOTHYROXINE SODIUM 0.05 MG/1
50 TABLET ORAL
Qty: 90 TAB | Refills: 3 | Status: SHIPPED | OUTPATIENT
Start: 2017-06-16 | End: 2018-07-30 | Stop reason: SDUPTHER

## 2017-06-16 RX ORDER — FERROUS SULFATE 325(65) MG
325 TABLET ORAL DAILY
Qty: 30 TAB | Refills: 3 | Status: SHIPPED | OUTPATIENT
Start: 2017-06-16 | End: 2017-07-07 | Stop reason: SDUPTHER

## 2017-06-16 SDOH — ECONOMIC STABILITY: HOUSING INSECURITY
HOME SAFETY: INSTRUCTED WIFE TO REMOVE THROW RUGS. PT HAS STALL SHOWER W/ 6 LIP. HAS SHOWER CHAIR BUT NEEDS TUB TRANSFER BENCH. RECOMMEND THAT WIFE RETURN SHOWER CHAIR AND REPLACE W/ BENCH."

## 2017-06-16 ASSESSMENT — PAIN SCALES - GENERAL: PAINLEVEL: 4=SLIGHT-MODERATE PAIN

## 2017-06-16 NOTE — PROGRESS NOTES
Chief Complaint   Patient presents with   • Fall     Follow Up        HISTORY OF PRESENT ILLNESS: Patient is a 83 y.o. male established patient who presents today to discuss multiple issues.    Anemia, blood loss  Hematocrit and hemoglobin continued to be decreased on patient's release from the hospital. Patient denies dizziness, heart rate is 72 states that he is overall feeling well. Patient continues to take iron as prescribed by the hospitalist. Plan to repeat CBC at follow-up in July.    Anxiety  Chronic in nature. Stable. Patient is taking Seroquel and Cymbalta for symptoms which she states are controlled at this time. Patient is not currently having dizziness. Denies constant worry, irritation, agitation. Patient does have dementia. Patient is refusing to wean off Seroquel at this time despite increased risk with use of this medication in elderly patients. Discussed with this patient that generally this provider does not manage Depakote and Seroquel plan to refer patient to geriatric specialist for management of these medications, anxiety and dementia.    Depression  Chronic in nature. Stable. Patient does continue to have some symptoms of depression. Makes jokes during exam and states he is feeling better since being released from the hospital. Denies suicidal or homicidal ideation.    Drug-induced constipation  Chronic in nature.. Stable. Patient is currently taking Colace and MiraLAX for this issue and is having daily soft brown bowel movements. We'll continue to monitor.    Femur fracture, left (CMS-HCC)  Patient was recently released to the hospital after surgery for femur neck fracture. Patient is currently using a wheelchair but is completing physical therapy and working on walking. Patient does also have home care nurse as well as OT. Patient states pain is well controlled on current medication, patient does have follow up with pain specialist on July 17 which patient is encouraged to keep. Patient is  also encouraged to schedule follow-up with orthopedic surgeon and patient will call regarding orthopedic clinic to schedule. Incision is well-healed at this time. Patient refuses to complete DEXA scan at this time.    GERD (gastroesophageal reflux disease)  Chronic in nature. Stable. Patient continues to take famotidine 20 mg twice daily at this time. Patient continues to refuse to see gastroenterology. Denies abdominal pain, epigastric pain, nausea vomiting, belching. States that there are specific foods that make symptoms worse.     Hypertension  Chronic in nature. Blood pressure today is 110/62. Patient is currently taking amlodipine 5 mg daily as well as lisinopril 10 mg daily. Patient denies dizziness, chest pain, palpitations, headache, shortness of breath or other concerning symptoms denies side effects of medications. We'll consider decreasing blood pressure medication if blood pressure continues to be low.    Hypothyroidism  Chronic in nature. Status unknown. Patient did not complete labs ordered. Discussed with patient that he will need to complete labs ordered by this provider for any further refills of Synthroid. Taking medicine as directed. Denies palpitations, skin changes, temperature intolerance, changes in bowel habits.      Patient Active Problem List    Diagnosis Date Noted   • Hypertension 11/22/2011     Priority: Low   • Hypothyroidism 09/01/2009     Priority: Low   • Depression 06/15/2009     Priority: Low   • Anemia, blood loss 05/12/2017   • Iron deficiency 05/12/2017   • Hyponatremia 05/12/2017   • Femur fracture, left (CMS-HCC) 05/11/2017   • Chronic midline low back pain without sciatica 02/24/2017   • Fall 02/24/2017   • Drug-induced constipation 02/24/2017   • Protein calorie malnutrition (CMS-HCC) 12/01/2016   • Controlled type 2 diabetes mellitus without complication, without long-term current use of insulin (CMS-HCC) 12/01/2016   • Gait disorder 05/11/2016   • Insomnia 04/06/2016   •  Squamous cell cancer of tongue (CMS-HCC) 11/08/2012   • GERD (gastroesophageal reflux disease) 11/08/2010   • Anxiety 01/25/2010   • GOUT 06/15/2009       Allergies:Nkda    Current Outpatient Prescriptions   Medication Sig Dispense Refill   • lisinopril (PRINIVIL) 10 MG Tab Take 1 Tab by mouth every day. 90 Tab 3   • ferrous sulfate 325 (65 FE) MG tablet Take 1 Tab by mouth every day. With 250 mg vit C and a stool softner such as colace 30 Tab 3   • amlodipine (NORVASC) 5 MG Tab Take 1 Tab by mouth every day. 90 Tab 3   • levothyroxine (SYNTHROID) 50 MCG Tab Take 1 Tab by mouth Every morning on an empty stomach. 90 Tab 3   • famotidine (PEPCID) 20 MG Tab Take 1 Tab by mouth 2 times a day. 60 Tab 3   • metformin (GLUCOPHAGE) 500 MG Tab Take 500 mg by mouth 2 times a day, with meals. for DM2 management     • ascorbic acid (VITAMIN C) 500 MG tablet Take 250 mg by mouth every day. supplement for wound healing     • polyethylene glycol 3350 (MIRALAX) Powder Take 17 g by mouth every day. for constipation prevention     • quetiapine (SEROQUEL) 300 MG tablet Take 1 Tab by mouth every evening. 60 Tab 3   • divalproex ER (DEPAKOTE ER) 250 MG TABLET SR 24 HR Take 1 Tab by mouth every day. 30 Tab    • tamsulosin (FLOMAX) 0.4 MG capsule Take 1 Cap by mouth ONE-HALF HOUR AFTER BREAKFAST. 30 Cap    • duloxetine (CYMBALTA) 60 MG Cap DR Particles delayed-release capsule Take 1 Cap by mouth every day. 90 Cap 3   • oxycodone immediate release (ROXICODONE) 10 MG immediate release tablet Take 1 Tab by mouth every four hours as needed for Severe Pain. 60 Tab 0   • docusate sodium (COLACE) 100 MG Cap Take 1 Cap by mouth 2 times a day. 180 Cap 3     No current facility-administered medications for this visit.       Social History   Substance Use Topics   • Smoking status: Former Smoker -- 1.00 packs/day for 10 years     Types: Cigarettes     Quit date: 01/01/1965   • Smokeless tobacco: Never Used   • Alcohol Use: No       No family status  "information on file.     Family History   Problem Relation Age of Onset   • Heart Disease Father    • Diabetes Neg Hx    • Stroke Neg Hx    • Cancer Neg Hx      stomach       Review of Systems:   Constitutional:  Negative for fever, chills, weight loss and malaise/fatigue.   HEENT:  Negative for ear pain, nosebleeds, congestion, sore throat and neck pain.    Eyes:  Negative for blurred vision.   Respiratory:  Negative for cough, sputum production, shortness of breath and wheezing.    Cardiovascular:  Negative for chest pain, palpitations, orthopnea and leg swelling.   Gastrointestinal:  Negative for heartburn, nausea, vomiting and abdominal pain.   Genitourinary:  Negative for dysuria, urgency and frequency.   Musculoskeletal:  Negative for myalgias, back pain and joint pain.   Skin:  Negative for rash and itching.   Neurological:  Negative for dizziness, tingling, tremors, sensory change, focal weakness and headaches.   Endo/Heme/Allergies:  Does not bruise/bleed easily.   Psychiatric/Behavioral:  Negative for depression, suicidal ideas and memory loss.  The patient is not nervous/anxious and does not have insomnia.    All other systems reviewed and are negative except as in HPI.    Exam:  Blood pressure 110/62, pulse 72, temperature 36.8 °C (98.2 °F), resp. rate 14, height 1.803 m (5' 11\"), weight 49.896 kg (110 lb), SpO2 98 %.  General:  Normal appearing. No distress.  HEENT:  Normocephalic. Eyes conjunctiva clear lids without ptosis, pupils equal and reactive to light accommodation, ears normal shape and contour, canals are clear bilaterally, tympanic membranes are benign, nasal mucosa benign, oropharynx is without erythema, edema or exudates.   Neck:  Supple without JVD or bruit. Thyroid is not enlarged.  Pulmonary:  Clear to ausculation.  Normal effort. No rales, ronchi, or wheezing.  Cardiovascular:  Regular rate and rhythm without murmur. Carotid and radial pulses are intact and equal bilaterally.  Abdomen:  " Soft, nontender, nondistended. Normal bowel sounds. Liver and spleen are not palpable  Neurologic:  Grossly nonfocal  Lymph:  No cervical, supraclavicular or axillary lymph nodes are palpable  Skin:  Warm and dry.  No obvious lesions.  Musculoskeletal: Unsteady patient is unable to ambulate with a wheelchair. No extremity cyanosis, clubbing, or edema. Incision on the left hip is well-healed.  Psych:  Normal mood and affect. Alert and oriented to person and place. Judgment and insight is impaired.      Medical decision-making and discussion: Santosh is an ill-appearing 83-year-old male patient here today to follow-up after hospitalization. Patient does have a history of dementia, anxiety, depression and Depakote,Seroquel,Cymbalta at this time discussed with patient that Seroquel is not recommended and that this provider does not generally manage these medications. Patient is referred to geriatric specialist for management of these medications, counseled patient regarding possibly decreasing his blood pressure medication if his blood pressure continues to be low or if patient starts to have dizziness. Patient continues to take metformin 500 mg twice daily for diabetes control discussed with patient needing to repeat hemoglobin A1c as well as other labs. Plan to have patient obtain labs as follows in July. Patient states that he is going to stop taking Flomax as they're unsure why he was placed on the medication. Patient continues to take iron for anemia, other medications as prescribed. With regards to oxycodone patient has an appointment with pain management in July states that they have enough medication to last until this appointment. Patient does currently have home nursing care as well as PT and OT. Patient will follow-up in one month. Patient is encouraged to be seen in the emergency room for chest pain, palpitations, shortness of breath, dizziness, severe abdominal pain or other concerning symptoms.    Patient  was seen for 40 minutes face to face of which, greater than 50% was spent counseling regarding the above mentioned problems.    Please note that this dictation was created using voice recognition software. I have made every reasonable attempt to correct obvious errors, but I expect that there are errors of grammar and possibly content that I did not discover before finalizing the note.    Assessment/Plan:  1. Essential hypertension  lisinopril (PRINIVIL) 10 MG Tab    amlodipine (NORVASC) 5 MG Tab   2. Gastroesophageal reflux disease, esophagitis presence not specified  famotidine (PEPCID) 20 MG Tab   3. Pain in left hip     4. Dementia with behavioral disturbance, unspecified dementia type  REFERRAL TO GERIATRICS   5. Anemia, blood loss  ferrous sulfate 325 (65 FE) MG tablet   6. Acquired hypothyroidism  levothyroxine (SYNTHROID) 50 MCG Tab   7. Anxiety     8. Other depression     9. Drug-induced constipation     10. Closed fracture of neck of left femur, sequela (CMS-HCC)            I have placed the below orders and discussed them with an approved delegating provider. The MA is performing the below orders under the direction of Dr. Bynum.

## 2017-06-16 NOTE — ASSESSMENT & PLAN NOTE
Chronic in nature. Stable. Patient is taking Seroquel and Cymbalta for symptoms which she states are controlled at this time. Patient is not currently having dizziness. Denies constant worry, irritation, agitation. Patient does have dementia. Patient is refusing to wean off Seroquel at this time despite increased risk with use of this medication in elderly patients. Discussed with this patient that generally this provider does not manage Depakote and Seroquel plan to refer patient to geriatric specialist for management of these medications, anxiety and dementia.

## 2017-06-16 NOTE — ASSESSMENT & PLAN NOTE
Hematocrit and hemoglobin continued to be decreased on patient's release from the hospital. Patient denies dizziness, heart rate is 72 states that he is overall feeling well. Patient continues to take iron as prescribed by the hospitalist. Plan to repeat CBC at follow-up in July.

## 2017-06-16 NOTE — ASSESSMENT & PLAN NOTE
Chronic in nature. Status unknown. Patient did not complete labs ordered. Discussed with patient that he will need to complete labs ordered by this provider for any further refills of Synthroid. Taking medicine as directed. Denies palpitations, skin changes, temperature intolerance, changes in bowel habits.

## 2017-06-16 NOTE — MR AVS SNAPSHOT
"        Santosh Casas   2017 9:40 AM   Office Visit   MRN: [id number]    Department:  [facility]   Dept Phone:  [phone]    Description:  Male : 1934   Provider:  GARCIA Valentine           Reason for Visit     Fall Follow Up       Allergies as of 2017     Allergen Noted Reactions    Nkda [No Known Drug Allergy] 2010         You were diagnosed with     Essential hypertension   [6439438]       Gastroesophageal reflux disease, esophagitis presence not specified   [2864774]       Pain in left hip   [931996]       Dementia with behavioral disturbance, unspecified dementia type   [0539584]       Anemia, blood loss   [247072]       Acquired hypothyroidism   [8438145]         Vital Signs     Blood Pressure Pulse Temperature Respirations Height Weight    110/62 mmHg 72 36.8 °C (98.2 °F) 14 1.803 m (5' 11\") 49.896 kg (110 lb)    Body Mass Index Oxygen Saturation Smoking Status             15.35 kg/m2 98% Former Smoker         Basic Information     Date Of Birth Sex Race Ethnicity Preferred Language    1934 Male White Non- English      Your appointments     2017 To Be Determined   SN-HH-HOME VISIT with Payal Madrid L.P.N.   [facility] (--)    [facility] [address]   [phone]            2017  3:00 PM   PT-HH-HOME VISIT with Zoë Ozuna P.T.A.   [facility] (--)    [address]  [place] [address]   [phone]            2017 To Be Determined   PT-HH-HOME VISIT with Shena Nation P.T.   [facility] (--)    [address]  [place] [address]   [phone]            2017 To Be Determined   SN-HH-HOME VISIT with Payal Madrid L.P.N.   [facility] (--)    [address]  [place] [address]   [phone]            2017 To Be Determined   SN-HH-HOME VISIT with [facility] (--)    [address]  [facility] [address]   [phone]           "    Jun 26, 2017 To Be Determined   PT-HH-HOME VISIT with Shena Nation P.T.   [facility] (--)    [address]  [place] [address]   [phone]            Jun 28, 2017 To Be Determined   PT-HH-HOME VISIT with Shena Nation P.T.   [facility] (--)    [address]  [place] [address]   [phone]            Jun 29, 2017 To Be Determined   [facility]    [address]  [place] [address]   [phone]            Jul 05, 2017 To Be Determined   [facility]    [address]  [place] [address]   [phone]            Jul 06, 2017 To Be Determined   SN-HH-HOME VISIT with Joselin Milner R.N.   [facility] (--)    [address]  [place] [address]   [phone]            Jul 07, 2017 To Be Determined   [facility]    [address]  [place] [address]   [phone]            Jul 07, 2017  9:40 AM   Established Patient with GARCIA Valentine   [facility] [place] (--)    [address]  [address]  [place] [address]   [phone]           You will be receiving a confirmation call a few days before your appointment from our automated call confirmation system.            Jul 13, 2017 To Be Determined   [facility]    [address]  [place] [address]   [phone]            Jul 17, 2017 10:15 AM   [facility]    [address]  [place] [address] [phone]           Please bring Photo ID, Insurance Cards, All Medication Bottles and copies of any legal documents (such as Living Will, Power of ) If speaking a language besides English please bring an adult . Please arrive 30 minutes prior for check in and registration. You  will be receiving a confirmation call a few days before your appointment from our automated call confirmation system.              Problem List              ICD-10-CM Priority Class Noted - Resolved    GOUT    6/15/2009 - Present    Depression F32.9 Low  6/15/2009 - Present    Hypothyroidism E03.9 Low  9/1/2009 - Present    Anxiety F41.9   1/25/2010 - Present    GERD (gastroesophageal reflux disease) K21.9   11/8/2010 - Present    Hypertension I10 Low  11/22/2011 - Present    Squamous cell cancer of tongue (CMS-HCC) C02.9   11/8/2012 - Present    Insomnia G47.00   4/6/2016 - Present    Gait disorder R26.9   5/11/2016 - Present    Protein calorie malnutrition (CMS-HCC) E46   12/1/2016 - Present    Controlled type 2 diabetes mellitus without complication, without long-term current use of insulin (CMS-HCC) E11.9   12/1/2016 - Present    Chronic midline low back pain without sciatica M54.5, G89.29   2/24/2017 - Present    Fall W19.XXXA   2/24/2017 - Present    Drug-induced constipation K59.03   2/24/2017 - Present    Femur fracture, left (CMS-HCC) S72.92XA   5/11/2017 - Present    Anemia, blood loss D50.0   5/12/2017 - Present    Iron deficiency E61.1   5/12/2017 - Present    Hyponatremia E87.1   5/12/2017 - Present      Health Maintenance        Date Due Completion Dates    IMM DTaP/Tdap/Td Vaccine (1 - Tdap) 4/27/1953 ---    IMM ZOSTER VACCINE 4/27/1994 ---    IMM PNEUMOCOCCAL 65+ (ADULT) LOW/MEDIUM RISK SERIES (2 of 2 - PCV13) 11/23/2012 11/23/2011    RETINAL SCREENING 3/20/2015 3/20/2014, 3/20/2014    FASTING LIPID PROFILE 6/8/2016 6/8/2015, 6/23/2014, 4/12/2011, 11/9/2009, 1/12/2006    URINE ACR / MICROALBUMIN 6/8/2016 6/8/2015, 6/23/2014, 12/12/2012, 7/17/2009    A1C SCREENING 7/14/2017 1/14/2017, 8/30/2016, 1/6/2016, 6/8/2015, 6/23/2014, 12/12/2012, 11/23/2011, 4/12/2011, 2/24/2010, 11/9/2009, 7/17/2009, 1/12/2006, 1/11/2006    SERUM CREATININE 5/16/2018 5/16/2017, 5/15/2017, 5/14/2017, 5/13/2017, 5/12/2017,  5/11/2017, 1/14/2017, 11/8/2016, 6/8/2015, 8/22/2013, 8/21/2013, 7/12/2013, 12/12/2012, 12/11/2012, 5/15/2012, 5/15/2012, 11/28/2011, 11/23/2011, 11/21/2011, 2/24/2010, 12/27/2009, 1/12/2006, 1/11/2006, 1/10/2006    COLONOSCOPY 6/16/2024 6/16/2014 (N/S)    Override on 6/16/2014: (N/S)            Current Immunizations     Influenza TIV (IM) 11/23/2011 11:35 AM    Pneumococcal polysaccharide vaccine (PPSV-23) 11/23/2011 11:45 AM      Below and/or attached are the medications your provider expects you to take. Review all of your home medications and newly ordered medications with your provider and/or pharmacist. Follow medication instructions as directed by your provider and/or pharmacist. Please keep your medication list with you and share with your provider. Update the information when medications are discontinued, doses are changed, or new medications (including over-the-counter products) are added; and carry medication information at all times in the event of emergency situations     Allergies:  NKDA - (reactions not documented)               Medications  Valid as of: June 16, 2017 - 10:22 AM    Generic Name Brand Name Tablet Size Instructions for use    AmLODIPine Besylate (Tab) NORVASC 5 MG Take 1 Tab by mouth every day.        Ascorbic Acid (Tab) VITAMIN C 500 MG Take 250 mg by mouth every day. supplement for wound healing        Divalproex Sodium (TABLET SR 24 HR) DEPAKOTE  MG Take 1 Tab by mouth every day.        Docusate Sodium (Cap) COLACE 100 MG Take 1 Cap by mouth 2 times a day.        DULoxetine HCl (Cap DR Particles) CYMBALTA 60 MG Take 1 Cap by mouth every day.        Famotidine (Tab) PEPCID 20 MG Take 1 Tab by mouth 2 times a day.        Ferrous Sulfate (Tab) ferrous sulfate 325 (65 FE) MG Take 1 Tab by mouth every day. With 250 mg vit C and a stool softner such as colace        Levothyroxine Sodium (Tab) SYNTHROID 50 MCG Take 1 Tab by mouth Every morning on an empty stomach.        Lisinopril  (Tab) PRINIVIL 10 MG Take 1 Tab by mouth every day.        MetFORMIN HCl (Tab) GLUCOPHAGE 500 MG Take 500 mg by mouth 2 times a day, with meals. for DM2 management        OxyCODONE HCl (Tab) ROXICODONE 10 MG Take 1 Tab by mouth every four hours as needed for Severe Pain.        Polyethylene Glycol 3350 (Powder) MIRALAX  Take 17 g by mouth every day. for constipation prevention        QUEtiapine Fumarate (Tab) SEROQUEL 300 MG Take 1 Tab by mouth every evening.        Tamsulosin HCl (Cap) FLOMAX 0.4 MG Take 1 Cap by mouth ONE-HALF HOUR AFTER BREAKFAST.        .                 Medicines prescribed today were sent to:     [id number] - [place], [place] - [address] [facility]    [address] [facility] [place] [address]    Phone: [phone] Fax: [phone]    Open 24 Hours?: No      Medication refill instructions:       If your prescription bottle indicates you have medication refills left, it is not necessary to call your provider’s office. Please contact your pharmacy and they will refill your medication.    If your prescription bottle indicates you do not have any refills left, you may request refills at any time through one of the following ways: The online [employer] system (except Urgent Care), by calling your provider’s office, or by asking your pharmacy to contact your provider’s office with a refill request. Medication refills are processed only during regular business hours and may not be available until the next business day. Your provider may request additional information or to have a follow-up visit with you prior to refilling your medication.   *Please Note: Medication refills are assigned a new Rx number when refilled electronically. Your pharmacy may indicate that no refills were authorized even though a new prescription for the same medication is available at the pharmacy. Please request the medicine by name with the pharmacy before contacting your provider for a refill.        Referral     A referral  request has been sent to our patient care coordination department. Please allow 3-5 business days for us to process this request and contact you either by phone or mail. If you do not hear from us by the 5th business day, please call us at [phone].        Other Notes About Your Plan     Please Assess and Status Chronic Disease from Current and Prior Visits.     Query: Please Assess the following Diagnosis    1. Suggested code 707.14 Ulcer of heel and midfoot. Diagnosis 11/12/2014 per Dr German Bush see Epic note entry of 11/230/2014 dated 11/12/2014. Please status and document if ulcer remains/treatment.     2.Suggested Code 296.30 Major depressive disorder, recurrent episode, unspecified degree. Please state if depression mild, moderate or severe. Long term history of Depression patient taking Xanax from 03/12/2009 with last refill 07/09/2015. Remeron 01/16/2009 with last Refill 06/10/2015 and Seroquel 01/05/2011 with last refill 06/12/2015. Please status diagnosis for these medicaitons.     3. 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

## 2017-06-16 NOTE — ASSESSMENT & PLAN NOTE
Chronic in nature. Blood pressure today is 110/62. Patient is currently taking amlodipine 5 mg daily as well as lisinopril 10 mg daily. Patient denies dizziness, chest pain, palpitations, headache, shortness of breath or other concerning symptoms denies side effects of medications. We'll consider decreasing blood pressure medication if blood pressure continues to be low.

## 2017-06-16 NOTE — ASSESSMENT & PLAN NOTE
Chronic in nature. Stable. Patient does continue to have some symptoms of depression. Makes jokes during exam and states he is feeling better since being released from the hospital. Denies suicidal or homicidal ideation.

## 2017-06-16 NOTE — ASSESSMENT & PLAN NOTE
Patient was recently released to the hospital after surgery for femur neck fracture. Patient is currently using a wheelchair but is completing physical therapy and working on walking. Patient does also have home care nurse as well as OT. Patient states pain is well controlled on current medication, patient does have follow up with pain specialist on July 17 which patient is encouraged to keep. Patient is also encouraged to schedule follow-up with orthopedic surgeon and patient will call regarding orthopedic clinic to schedule. Incision is well-healed at this time. Patient refuses to complete DEXA scan at this time.

## 2017-06-16 NOTE — ASSESSMENT & PLAN NOTE
Chronic in nature.. Stable. Patient is currently taking Colace and MiraLAX for this issue and is having daily soft brown bowel movements. We'll continue to monitor.

## 2017-06-16 NOTE — ASSESSMENT & PLAN NOTE
Chronic in nature. Stable. Patient continues to take famotidine 20 mg twice daily at this time. Patient continues to refuse to see gastroenterology. Denies abdominal pain, epigastric pain, nausea vomiting, belching. States that there are specific foods that make symptoms worse.

## 2017-06-20 ENCOUNTER — HOME CARE VISIT (OUTPATIENT)
Dept: HOME HEALTH SERVICES | Facility: HOME HEALTHCARE | Age: 82
End: 2017-06-20
Payer: MEDICARE

## 2017-06-20 ENCOUNTER — TELEPHONE (OUTPATIENT)
Dept: MEDICAL GROUP | Facility: PHYSICIAN GROUP | Age: 82
End: 2017-06-20

## 2017-06-20 VITALS
RESPIRATION RATE: 16 BRPM | DIASTOLIC BLOOD PRESSURE: 52 MMHG | TEMPERATURE: 98.9 F | SYSTOLIC BLOOD PRESSURE: 100 MMHG | HEART RATE: 85 BPM

## 2017-06-20 DIAGNOSIS — K59.03 DRUG-INDUCED CONSTIPATION: ICD-10-CM

## 2017-06-20 PROCEDURE — G0496 LPN CARE TRAIN/EDU IN HH: HCPCS

## 2017-06-20 RX ORDER — POLYETHYLENE GLYCOL 3350 17 G/17G
17 POWDER, FOR SOLUTION ORAL EVERY 6 HOURS
Qty: 30 EACH | Refills: 2 | Status: SHIPPED | OUTPATIENT
Start: 2017-06-20 | End: 2018-07-21 | Stop reason: SDUPTHER

## 2017-06-20 RX ORDER — BISACODYL 10 MG
10 SUPPOSITORY, RECTAL RECTAL DAILY
Qty: 10 SUPPOSITORY | Refills: 0 | Status: SHIPPED | OUTPATIENT
Start: 2017-06-20 | End: 2021-01-01

## 2017-06-20 SDOH — ECONOMIC STABILITY: HOUSING INSECURITY: UNSAFE COOKING RANGE AREA: 0

## 2017-06-20 SDOH — ECONOMIC STABILITY: HOUSING INSECURITY: UNSAFE APPLIANCES: 0

## 2017-06-21 ENCOUNTER — HOME CARE VISIT (OUTPATIENT)
Dept: HOME HEALTH SERVICES | Facility: HOME HEALTHCARE | Age: 82
End: 2017-06-21
Payer: MEDICARE

## 2017-06-21 VITALS
DIASTOLIC BLOOD PRESSURE: 50 MMHG | SYSTOLIC BLOOD PRESSURE: 100 MMHG | HEART RATE: 86 BPM | TEMPERATURE: 99.1 F | RESPIRATION RATE: 18 BRPM

## 2017-06-21 VITALS
HEART RATE: 85 BPM | RESPIRATION RATE: 18 BRPM | TEMPERATURE: 99.1 F | DIASTOLIC BLOOD PRESSURE: 50 MMHG | SYSTOLIC BLOOD PRESSURE: 100 MMHG

## 2017-06-21 PROCEDURE — G0157 HHC PT ASSISTANT EA 15: HCPCS

## 2017-06-21 PROCEDURE — G0152 HHCP-SERV OF OT,EA 15 MIN: HCPCS

## 2017-06-21 ASSESSMENT — ENCOUNTER SYMPTOMS: DIFFICULTY THINKING: 1

## 2017-06-21 NOTE — TELEPHONE ENCOUNTER
Called and spoke with Ailyn patient's wife. Discussed increasing the MiraLAX to every 6 hours and adding a suppository once daily until patient has a bowel movement. Discussed ER precautions if patient becomes bloated or starts having severe abdominal pain he should be seen in the emergency room. Patient is to contact this provider if patient does not have a bowel movement in the next 2 days.

## 2017-06-22 ENCOUNTER — HOME CARE VISIT (OUTPATIENT)
Dept: HOME HEALTH SERVICES | Facility: HOME HEALTHCARE | Age: 82
End: 2017-06-22
Payer: MEDICARE

## 2017-06-22 VITALS
RESPIRATION RATE: 16 BRPM | SYSTOLIC BLOOD PRESSURE: 120 MMHG | DIASTOLIC BLOOD PRESSURE: 60 MMHG | HEART RATE: 80 BPM | TEMPERATURE: 99.2 F

## 2017-06-22 PROCEDURE — G0299 HHS/HOSPICE OF RN EA 15 MIN: HCPCS

## 2017-06-22 SDOH — ECONOMIC STABILITY: HOUSING INSECURITY: UNSAFE APPLIANCES: 0

## 2017-06-22 SDOH — ECONOMIC STABILITY: HOUSING INSECURITY: UNSAFE COOKING RANGE AREA: 0

## 2017-06-22 ASSESSMENT — ENCOUNTER SYMPTOMS
VOMITING: DENIES
NAUSEA: DENIES

## 2017-06-22 ASSESSMENT — ACTIVITIES OF DAILY LIVING (ADL)
MEAL_PREP_ASSISTANCE: 6
HOUSEKEEPING_ASSISTANCE: 6
TOILETING_ASSISTANCE: 6
DRESSING_UB_ASSISTANCE: 6
TRANSPORTATION_ASSISTANCE: 6
TELEPHONE_ASSISTANCE: 6
SHOPPING_ASSISTANCE: 6
EATING_ASSISTANCE: 0
LAUNDRY_ASSISTANCE: 6
DRESSING_LB_ASSISTANCE: 6
BATHING_ASSISTANCE: 6
ORAL_CARE_ASSISTANCE: 6
GROOMING_ASSISTANCE: 6

## 2017-06-23 ENCOUNTER — HOME CARE VISIT (OUTPATIENT)
Dept: HOME HEALTH SERVICES | Facility: HOME HEALTHCARE | Age: 82
End: 2017-06-23
Payer: MEDICARE

## 2017-06-23 VITALS
TEMPERATURE: 98.8 F | HEART RATE: 88 BPM | DIASTOLIC BLOOD PRESSURE: 62 MMHG | RESPIRATION RATE: 16 BRPM | SYSTOLIC BLOOD PRESSURE: 124 MMHG

## 2017-06-23 DIAGNOSIS — K59.03 DRUG-INDUCED CONSTIPATION: ICD-10-CM

## 2017-06-23 PROCEDURE — G0151 HHCP-SERV OF PT,EA 15 MIN: HCPCS

## 2017-06-23 ASSESSMENT — ENCOUNTER SYMPTOMS: DIFFICULTY THINKING: 1

## 2017-06-26 ENCOUNTER — HOME CARE VISIT (OUTPATIENT)
Dept: HOME HEALTH SERVICES | Facility: HOME HEALTHCARE | Age: 82
End: 2017-06-26
Payer: MEDICARE

## 2017-06-26 ENCOUNTER — TELEPHONE (OUTPATIENT)
Dept: MEDICAL GROUP | Facility: PHYSICIAN GROUP | Age: 82
End: 2017-06-26

## 2017-06-26 VITALS
HEART RATE: 80 BPM | TEMPERATURE: 98.3 F | DIASTOLIC BLOOD PRESSURE: 58 MMHG | SYSTOLIC BLOOD PRESSURE: 100 MMHG | RESPIRATION RATE: 16 BRPM

## 2017-06-26 VITALS
HEART RATE: 80 BPM | SYSTOLIC BLOOD PRESSURE: 100 MMHG | DIASTOLIC BLOOD PRESSURE: 58 MMHG | RESPIRATION RATE: 16 BRPM | TEMPERATURE: 98.3 F

## 2017-06-26 PROCEDURE — G0299 HHS/HOSPICE OF RN EA 15 MIN: HCPCS

## 2017-06-26 PROCEDURE — G0152 HHCP-SERV OF OT,EA 15 MIN: HCPCS

## 2017-06-26 NOTE — TELEPHONE ENCOUNTER
Called and spoke with patient wife Ailyn. She said he has a bowl movement Saturday 06/24/2017, wife said he went 4 times. Patient still does not have stomach pain or cramping.

## 2017-06-27 ENCOUNTER — HOME CARE VISIT (OUTPATIENT)
Dept: HOME HEALTH SERVICES | Facility: HOME HEALTHCARE | Age: 82
End: 2017-06-27
Payer: MEDICARE

## 2017-06-27 VITALS
DIASTOLIC BLOOD PRESSURE: 65 MMHG | TEMPERATURE: 98.2 F | HEART RATE: 74 BPM | SYSTOLIC BLOOD PRESSURE: 121 MMHG | RESPIRATION RATE: 16 BRPM

## 2017-06-27 PROCEDURE — G0153 HHCP-SVS OF S/L PATH,EA 15MN: HCPCS

## 2017-06-27 PROCEDURE — A9300 EXERCISE EQUIPMENT: HCPCS

## 2017-06-27 PROCEDURE — G0151 HHCP-SERV OF PT,EA 15 MIN: HCPCS

## 2017-06-27 SDOH — ECONOMIC STABILITY: HOUSING INSECURITY: UNSAFE COOKING RANGE AREA: 0

## 2017-06-27 SDOH — ECONOMIC STABILITY: HOUSING INSECURITY: UNSAFE APPLIANCES: 0

## 2017-06-27 ASSESSMENT — ENCOUNTER SYMPTOMS
RESPIRATORY SYMPTOMS COMMENTS: DENIES ANY SOB
POOR JUDGMENT: 1
NAUSEA: DENIES ANY
VOMITING: DENIES ANY
DIFFICULTY THINKING: 1

## 2017-06-28 ENCOUNTER — HOME CARE VISIT (OUTPATIENT)
Dept: HOME HEALTH SERVICES | Facility: HOME HEALTHCARE | Age: 82
End: 2017-06-28
Payer: MEDICARE

## 2017-06-28 VITALS
SYSTOLIC BLOOD PRESSURE: 120 MMHG | DIASTOLIC BLOOD PRESSURE: 70 MMHG | TEMPERATURE: 98.5 F | HEART RATE: 79 BPM | RESPIRATION RATE: 18 BRPM

## 2017-06-28 VITALS
HEART RATE: 81 BPM | RESPIRATION RATE: 16 BRPM | SYSTOLIC BLOOD PRESSURE: 124 MMHG | DIASTOLIC BLOOD PRESSURE: 66 MMHG | TEMPERATURE: 209.5 F

## 2017-06-28 PROCEDURE — G0157 HHC PT ASSISTANT EA 15: HCPCS

## 2017-06-28 ASSESSMENT — ENCOUNTER SYMPTOMS: DIFFICULTY THINKING: 1

## 2017-06-29 ENCOUNTER — HOME CARE VISIT (OUTPATIENT)
Dept: HOME HEALTH SERVICES | Facility: HOME HEALTHCARE | Age: 82
End: 2017-06-29
Payer: MEDICARE

## 2017-06-29 VITALS
SYSTOLIC BLOOD PRESSURE: 118 MMHG | DIASTOLIC BLOOD PRESSURE: 60 MMHG | RESPIRATION RATE: 18 BRPM | TEMPERATURE: 98.5 F | HEART RATE: 97 BPM

## 2017-06-29 VITALS
DIASTOLIC BLOOD PRESSURE: 60 MMHG | TEMPERATURE: 98.5 F | RESPIRATION RATE: 17 BRPM | HEART RATE: 87 BPM | SYSTOLIC BLOOD PRESSURE: 118 MMHG

## 2017-06-29 PROCEDURE — G0152 HHCP-SERV OF OT,EA 15 MIN: HCPCS

## 2017-06-29 PROCEDURE — G0496 LPN CARE TRAIN/EDU IN HH: HCPCS

## 2017-06-29 PROCEDURE — G0153 HHCP-SVS OF S/L PATH,EA 15MN: HCPCS

## 2017-06-29 SDOH — ECONOMIC STABILITY: HOUSING INSECURITY: UNSAFE APPLIANCES: 0

## 2017-06-29 SDOH — ECONOMIC STABILITY: HOUSING INSECURITY: UNSAFE COOKING RANGE AREA: 0

## 2017-06-29 ASSESSMENT — ENCOUNTER SYMPTOMS
SHORTNESS OF BREATH: T
DIFFICULTY THINKING: 1

## 2017-06-30 VITALS — RESPIRATION RATE: 17 BRPM | TEMPERATURE: 98.5 F | HEART RATE: 87 BPM

## 2017-07-03 ENCOUNTER — HOME CARE VISIT (OUTPATIENT)
Dept: HOME HEALTH SERVICES | Facility: HOME HEALTHCARE | Age: 82
End: 2017-07-03
Payer: MEDICARE

## 2017-07-03 VITALS
SYSTOLIC BLOOD PRESSURE: 108 MMHG | DIASTOLIC BLOOD PRESSURE: 58 MMHG | TEMPERATURE: 98.8 F | RESPIRATION RATE: 16 BRPM | HEART RATE: 86 BPM

## 2017-07-03 PROCEDURE — G0152 HHCP-SERV OF OT,EA 15 MIN: HCPCS

## 2017-07-03 ASSESSMENT — ENCOUNTER SYMPTOMS: DIFFICULTY THINKING: 1

## 2017-07-05 ENCOUNTER — HOME CARE VISIT (OUTPATIENT)
Dept: HOME HEALTH SERVICES | Facility: HOME HEALTHCARE | Age: 82
End: 2017-07-05
Payer: MEDICARE

## 2017-07-05 VITALS
DIASTOLIC BLOOD PRESSURE: 60 MMHG | SYSTOLIC BLOOD PRESSURE: 114 MMHG | TEMPERATURE: 98.7 F | RESPIRATION RATE: 17 BRPM | HEART RATE: 81 BPM

## 2017-07-05 PROCEDURE — G0152 HHCP-SERV OF OT,EA 15 MIN: HCPCS

## 2017-07-06 ENCOUNTER — HOME CARE VISIT (OUTPATIENT)
Dept: HOME HEALTH SERVICES | Facility: HOME HEALTHCARE | Age: 82
End: 2017-07-06
Payer: MEDICARE

## 2017-07-06 VITALS — TEMPERATURE: 208.2 F | DIASTOLIC BLOOD PRESSURE: 58 MMHG | SYSTOLIC BLOOD PRESSURE: 110 MMHG | RESPIRATION RATE: 18 BRPM

## 2017-07-06 VITALS
HEART RATE: 82 BPM | TEMPERATURE: 208.2 F | DIASTOLIC BLOOD PRESSURE: 58 MMHG | RESPIRATION RATE: 18 BRPM | SYSTOLIC BLOOD PRESSURE: 110 MMHG

## 2017-07-06 PROCEDURE — G0151 HHCP-SERV OF PT,EA 15 MIN: HCPCS

## 2017-07-06 PROCEDURE — G0495 RN CARE TRAIN/EDU IN HH: HCPCS

## 2017-07-06 SDOH — ECONOMIC STABILITY: HOUSING INSECURITY: UNSAFE COOKING RANGE AREA: 0

## 2017-07-06 SDOH — ECONOMIC STABILITY: HOUSING INSECURITY: UNSAFE APPLIANCES: 0

## 2017-07-06 SDOH — ECONOMIC STABILITY: HOUSING INSECURITY: HOME SAFETY: NO FIRE EXTINGUISHER

## 2017-07-06 ASSESSMENT — ENCOUNTER SYMPTOMS
POOR JUDGMENT: 1
NAUSEA: DENIED
DIFFICULTY THINKING: 1
DIFFICULTY THINKING: 1
VOMITING: DENIED

## 2017-07-06 ASSESSMENT — ACTIVITIES OF DAILY LIVING (ADL): TRANSPORTATION COMMENTS: NEEDS ONE ASSIST TO LEAVE HOME SAFELY

## 2017-07-07 ENCOUNTER — HOME CARE VISIT (OUTPATIENT)
Dept: HOME HEALTH SERVICES | Facility: HOME HEALTHCARE | Age: 82
End: 2017-07-07
Payer: MEDICARE

## 2017-07-07 ENCOUNTER — OFFICE VISIT (OUTPATIENT)
Dept: MEDICAL GROUP | Facility: PHYSICIAN GROUP | Age: 82
End: 2017-07-07
Payer: MEDICARE

## 2017-07-07 VITALS
TEMPERATURE: 98 F | WEIGHT: 110 LBS | HEART RATE: 86 BPM | DIASTOLIC BLOOD PRESSURE: 60 MMHG | SYSTOLIC BLOOD PRESSURE: 110 MMHG | OXYGEN SATURATION: 98 % | HEIGHT: 71 IN | BODY MASS INDEX: 15.4 KG/M2

## 2017-07-07 VITALS
DIASTOLIC BLOOD PRESSURE: 62 MMHG | SYSTOLIC BLOOD PRESSURE: 120 MMHG | HEART RATE: 86 BPM | RESPIRATION RATE: 17 BRPM | TEMPERATURE: 98.1 F

## 2017-07-07 DIAGNOSIS — I10 ESSENTIAL HYPERTENSION: ICD-10-CM

## 2017-07-07 DIAGNOSIS — D50.0 ANEMIA, BLOOD LOSS: ICD-10-CM

## 2017-07-07 DIAGNOSIS — M54.50 CHRONIC MIDLINE LOW BACK PAIN WITHOUT SCIATICA: ICD-10-CM

## 2017-07-07 DIAGNOSIS — G47.00 INSOMNIA, UNSPECIFIED TYPE: ICD-10-CM

## 2017-07-07 DIAGNOSIS — K21.9 GASTROESOPHAGEAL REFLUX DISEASE, ESOPHAGITIS PRESENCE NOT SPECIFIED: ICD-10-CM

## 2017-07-07 DIAGNOSIS — R26.9 GAIT DISORDER: ICD-10-CM

## 2017-07-07 DIAGNOSIS — G89.29 CHRONIC MIDLINE LOW BACK PAIN WITHOUT SCIATICA: ICD-10-CM

## 2017-07-07 DIAGNOSIS — E46 PROTEIN CALORIE MALNUTRITION (HCC): ICD-10-CM

## 2017-07-07 DIAGNOSIS — K59.03 DRUG-INDUCED CONSTIPATION: ICD-10-CM

## 2017-07-07 DIAGNOSIS — E11.9 CONTROLLED TYPE 2 DIABETES MELLITUS WITHOUT COMPLICATION, WITHOUT LONG-TERM CURRENT USE OF INSULIN (HCC): ICD-10-CM

## 2017-07-07 DIAGNOSIS — M25.552 PAIN IN LEFT HIP: ICD-10-CM

## 2017-07-07 DIAGNOSIS — E61.1 IRON DEFICIENCY: ICD-10-CM

## 2017-07-07 PROCEDURE — G0155 HHCP-SVS OF CSW,EA 15 MIN: HCPCS

## 2017-07-07 PROCEDURE — G0151 HHCP-SERV OF PT,EA 15 MIN: HCPCS

## 2017-07-07 PROCEDURE — 99214 OFFICE O/P EST MOD 30 MIN: CPT | Performed by: NURSE PRACTITIONER

## 2017-07-07 RX ORDER — FAMOTIDINE 20 MG/1
20 TABLET, FILM COATED ORAL 2 TIMES DAILY
Qty: 60 TAB | Refills: 3 | Status: SHIPPED | OUTPATIENT
Start: 2017-07-07 | End: 2018-01-16 | Stop reason: SDUPTHER

## 2017-07-07 RX ORDER — LISINOPRIL 10 MG/1
10 TABLET ORAL DAILY
Qty: 90 TAB | Refills: 3 | Status: SHIPPED | OUTPATIENT
Start: 2017-07-07 | End: 2018-07-18 | Stop reason: SDUPTHER

## 2017-07-07 RX ORDER — AMLODIPINE BESYLATE 5 MG/1
5 TABLET ORAL DAILY
Qty: 90 TAB | Refills: 3 | Status: SHIPPED | OUTPATIENT
Start: 2017-07-07 | End: 2018-07-30 | Stop reason: SDUPTHER

## 2017-07-07 RX ORDER — FERROUS SULFATE 325(65) MG
325 TABLET ORAL DAILY
Qty: 30 TAB | Refills: 3 | Status: SHIPPED | OUTPATIENT
Start: 2017-07-07 | End: 2017-11-27

## 2017-07-07 RX ORDER — ASCORBIC ACID 500 MG
250 TABLET ORAL DAILY
Qty: 30 TAB | Refills: 3 | Status: SHIPPED | OUTPATIENT
Start: 2017-07-07 | End: 2021-01-01

## 2017-07-07 RX ORDER — OXYCODONE HYDROCHLORIDE 10 MG/1
10 TABLET ORAL EVERY 4 HOURS PRN
Qty: 20 TAB | Refills: 0 | Status: SHIPPED | OUTPATIENT
Start: 2017-07-07 | End: 2017-07-21

## 2017-07-07 ASSESSMENT — ENCOUNTER SYMPTOMS: DIFFICULTY THINKING: 1

## 2017-07-07 NOTE — ASSESSMENT & PLAN NOTE
Chronic in nature. Stable. Patient is using Seroquel at night with good results and does have enough of this medication to make it to his appointment with geriatrics. Patient states that he is having less pain, less fatigue during the day.

## 2017-07-07 NOTE — ASSESSMENT & PLAN NOTE
Patient was noted to have anemia in the hospital after his femur fracture at this time patient is encouraged to repeat labs, if anemia is resolved plan to take patient off iron as this increases his risk for constipation.

## 2017-07-07 NOTE — Clinical Note
[facility]  ALEXANDER ValentinePRoselynRCHEVY  [address]  [place] [address]  Fax: [phone]   Authorization for Release/Disclosure of   Protected Health Information   Name: RAJAT STRINGER : 1934 SSN: [id number]   Address: [address] [place] [address]  [place] [address] Phone:    [phone] (home)    I authorize the entity listed below to release/disclose the PHI below to:   [facility]/KASI Valentine.P.R.HERRERA and CHUCK ValentineRCHEVY   Provider or Entity Name:  [facility]   Address   City, State, Zip   Phone:      Fax:     Reason for request: continuity of care   Information to be released:    [  ] LAST COLONOSCOPY,  including any PATH REPORT and follow-up  [  ] LAST FIT/COLOGUARD RESULT [  ] LAST DEXA  [  ] LAST MAMMOGRAM  [  ] LAST PAP  [  ] LAST LABS [x] RETINA EXAM REPORT  [  ] IMMUNIZATION RECORDS  [  ] Release all info      [  ] Check here and initial the line next to each item to release ALL health information INCLUDING  _____ Care and treatment for drug and / or alcohol abuse  _____ HIV testing, infection status, or AIDS  _____ Genetic Testing    DATES OF SERVICE OR TIME PERIOD TO BE DISCLOSED: _____________  I understand and acknowledge that:  * This Authorization may be revoked at any time by you in writing, except if your health information has already been used or disclosed.  * Your health information that will be used or disclosed as a result of you signing this authorization could be re-disclosed by the recipient. If this occurs, your re-disclosed health information may no longer be protected by State or Federal laws.  * You may refuse to sign this Authorization. Your refusal will not affect your ability to obtain treatment.  * This Authorization becomes effective upon signing and will  on (date) __________.      If no date is indicated, this Authorization will  one (1) year from the signature date.    Name: Rajat Deluna  Augusto    Signature:   Date:     7/7/2017       PLEASE FAX REQUESTED RECORDS BACK TO: [phone]

## 2017-07-07 NOTE — ASSESSMENT & PLAN NOTE
Chronic in nature. Weight is stable. Patient continues to wear 110 pounds patient and wife are encouraged to increase patient's calorie intake, wife states that patient is not eating very well. Patient is supplementing calorie intake daily. Will continue to monitor.

## 2017-07-07 NOTE — ASSESSMENT & PLAN NOTE
Chronic in nature. Stable. Patient continues to take Pepcid twice daily at this time. Patient refuses to see gastroenterology at this time. Patient denies abdominal pain, epigastric pain, nausea, vomiting, states that he does no specific food that makes symptoms worse, states that he has had blood in his stool.

## 2017-07-07 NOTE — PROGRESS NOTES
Chief Complaint   Patient presents with   • Hip Pain       HISTORY OF PRESENT ILLNESS: Patient is a 83 y.o. male established patient who presents today to follow up regarding multiple issues.    GERD (gastroesophageal reflux disease)  Chronic in nature. Stable. Patient continues to take Pepcid twice daily at this time. Patient refuses to see gastroenterology at this time. Patient denies abdominal pain, epigastric pain, nausea, vomiting, states that he does no specific food that makes symptoms worse, states that he has had blood in his stool.    Insomnia  Chronic in nature. Stable. Patient is using Seroquel at night with good results and does have enough of this medication to make it to his appointment with geriatrics. Patient states that he is having less pain, less fatigue during the day.    Gait disorder  Chronic in nature. Worsened. Patient is currently using a wheelchair post femur fracture. Continues to have falls. Patient is seeing home health as well as physical therapy for this issue. Patient is doing better with walking per report of wife.    Protein calorie malnutrition (CMS-HCC)  Chronic in nature. Weight is stable. Patient continues to wear 110 pounds patient and wife are encouraged to increase patient's calorie intake, wife states that patient is not eating very well. Patient is supplementing calorie intake daily. Will continue to monitor.    Controlled type 2 diabetes mellitus without complication, without long-term current use of insulin (CMS-HCC)  Chronic in nature. Stable. Patient continues to take metformin 500 mg daily and this is working well. Patient denies side effects from medication. Counseled wife regarding signs of high or low blood sugar. Patient has not had any issues with this. Patient refuses to check his blood sugar at home labs are ordered at this time.    Chronic midline low back pain without sciatica  Chronic in nature. Stable. Patient continues to refuse follow-up with orthopedics.  Patient is currently taking oxycodone 10 mg 1-2 times daily which is decreased from previous dose. Patient does have follow-up appointment with Dr. Sanders regarding this related to patient's significant fall risk, hope is that there are other options to manage patient's pain not will not carry as much as she was constipation/dizziness. Patient refuses to provide urine for drug testing.    Drug-induced constipation  Chronic in nature. Stable. Patient's last bowel movement was yesterday patient continues to use MiraLAX, Colace and is encouraged to use suppositories as needed if patient is not having a minimum of one bowel movement every 3 days.    Iron deficiency  Patient was noted to have anemia in the hospital after his femur fracture at this time patient is encouraged to repeat labs, if anemia is resolved plan to take patient off iron as this increases his risk for constipation.    Hypertension  Chronic in nature. Stable. Blood pressure today is 110/60 patient is currently taking amlodipine 5 mg daily which is discontinued at this time as well as lisinopril 10 mg daily as patient's wife has noticed with both medications patient has had some increased dizziness. Patient denies chest pain, palpitations, headache, shortness of breath or other concerning symptoms, side effects of medications.      Patient Active Problem List    Diagnosis Date Noted   • Hypertension 11/22/2011     Priority: Low   • Hypothyroidism 09/01/2009     Priority: Low   • Depression 06/15/2009     Priority: Low   • Anemia, blood loss 05/12/2017   • Iron deficiency 05/12/2017   • Hyponatremia 05/12/2017   • Femur fracture, left (CMS-HCC) 05/11/2017   • Chronic midline low back pain without sciatica 02/24/2017   • Fall 02/24/2017   • Drug-induced constipation 02/24/2017   • Protein calorie malnutrition (CMS-HCC) 12/01/2016   • Controlled type 2 diabetes mellitus without complication, without long-term current use of insulin (CMS-HCC) 12/01/2016   •  Gait disorder 05/11/2016   • Insomnia 04/06/2016   • Squamous cell cancer of tongue (CMS-HCC) 11/08/2012   • GERD (gastroesophageal reflux disease) 11/08/2010   • Anxiety 01/25/2010   • GOUT 06/15/2009       Allergies:Nkda    Current Outpatient Prescriptions   Medication Sig Dispense Refill   • oxycodone immediate release (ROXICODONE) 10 MG immediate release tablet Take 1 Tab by mouth every four hours as needed for Severe Pain. 20 Tab 0   • lisinopril (PRINIVIL) 10 MG Tab Take 1 Tab by mouth every day. 90 Tab 3   • amlodipine (NORVASC) 5 MG Tab Take 1 Tab by mouth every day. 90 Tab 3   • famotidine (PEPCID) 20 MG Tab Take 1 Tab by mouth 2 times a day. 60 Tab 3   • ferrous sulfate 325 (65 FE) MG tablet Take 1 Tab by mouth every day. With 250 mg vit C and a stool softner such as colace 30 Tab 3   • ascorbic acid (VITAMIN C) 500 MG tablet Take 0.5 Tabs by mouth every day. supplement for wound healing 30 Tab 3   • Magnesium Citrate (CITRATE OF MAGNESIA) 1.745 GM/30ML Solution Take 150 mL by mouth every day. Until you have a BM. 195 mL 0   • bisacodyl (DULCOLAX) 10 MG Suppos Insert 1 Suppository in rectum every day. 10 Suppository 0   • polyethylene glycol/lytes (MIRALAX) Pack Take 1 Packet by mouth every 6 hours. Until bowel movement. 30 Each 2   • levothyroxine (SYNTHROID) 50 MCG Tab Take 1 Tab by mouth Every morning on an empty stomach. 90 Tab 3   • metformin (GLUCOPHAGE) 500 MG Tab Take 500 mg by mouth 2 times a day, with meals. for DM2 management     • quetiapine (SEROQUEL) 300 MG tablet Take 1 Tab by mouth every evening. 60 Tab 3   • divalproex ER (DEPAKOTE ER) 250 MG TABLET SR 24 HR Take 1 Tab by mouth every day. 30 Tab    • tamsulosin (FLOMAX) 0.4 MG capsule Take 1 Cap by mouth ONE-HALF HOUR AFTER BREAKFAST. 30 Cap    • duloxetine (CYMBALTA) 60 MG Cap DR Particles delayed-release capsule Take 1 Cap by mouth every day. 90 Cap 3   • docusate sodium (COLACE) 100 MG Cap Take 1 Cap by mouth 2 times a day. 180 Cap 3  "  • bisacodyl (DULCOLAX) 10 MG Suppos Insert 10 mg in rectum every day.       No current facility-administered medications for this visit.       Social History   Substance Use Topics   • Smoking status: Former Smoker -- 1.00 packs/day for 10 years     Types: Cigarettes     Quit date: 01/01/1965   • Smokeless tobacco: Never Used   • Alcohol Use: No       No family status information on file.     Family History   Problem Relation Age of Onset   • Heart Disease Father    • Diabetes Neg Hx    • Stroke Neg Hx    • Cancer Neg Hx      stomach       Review of Systems:   Constitutional:  Negative for fever, chills, weight loss and malaise/fatigue.   HEENT:  Negative for ear pain, nosebleeds, congestion, sore throat and neck pain.    Eyes:  Negative for blurred vision.   Respiratory:  Negative for cough, sputum production, shortness of breath and wheezing.    Cardiovascular:  Negative for chest pain, palpitations, orthopnea and leg swelling.   Gastrointestinal:  Negative for heartburn, nausea, vomiting and abdominal pain.   Genitourinary:  Negative for dysuria, urgency and frequency.   Musculoskeletal: Positive for myalgias, back pain and joint pain.   Skin:  Negative for rash and itching.   Neurological:  Negative for dizziness, tingling, tremors, sensory change, focal weakness and headaches.   Endo/Heme/Allergies:  Does not bruise/bleed easily.   Psychiatric/Behavioral:  Negative for depression, suicidal ideas and memory loss.  The patient is not nervous/anxious and does not have insomnia.    All other systems reviewed and are negative except as in HPI.    Exam:  Blood pressure 110/60, pulse 86, temperature 36.7 °C (98 °F), height 1.803 m (5' 10.98\"), weight 49.896 kg (110 lb), SpO2 98 %.  General:  Normal appearing. No distress.  Pulmonary:  Clear to ausculation.  Normal effort. No rales, ronchi, or wheezing.  Cardiovascular:  Regular rate and rhythm without murmur. Carotid and radial pulses are intact and equal " bilaterally.  Abdomen:  Soft, nontender, nondistended. Normal bowel sounds. Liver and spleen are not palpable  Neurologic:  Grossly nonfocal  Lymph:  No cervical, supraclavicular or axillary lymph nodes are palpable  Skin:  Warm and dry.  No obvious lesions.  Musculoskeletal: Wheelchair. No extremity cyanosis, clubbing, or edema.  Psych:  Normal mood and affect. Alert and oriented x3. Judgment and insight is normal.      Medical decision-making and discussion: Santosh is an ill-appearing 83-year-old male patient here today to follow-up regarding multiple issues. Small refill of oxycodone is provided until patient can establish with pain management. Lisinopril 10 mg daily is refilled at this time as well as Pepcid 20 mg 30 minutes prior to meals twice daily. Iron and vitamin C I refilled at this time counseled patient regarding the importance of soft brown bowel movements at least every 3 days discussed management of constipation. Discussed importance of completing previously ordered labs so that we may assess if patient's anemia has resolved and patient may be taken off iron. Counseled patient regarding the importance of seeing the geriatric specialist as well as Dr. Sanders for pain management as it is important to decrease patient's risk of fall by minimizing medications that are causing dizziness. Is also important to reduce patient's risk of constipation as he has had a couple of severe episodes with this recently. Counseled patient on the importance of continued physical therapy, discussed with patient the importance of letting this provider know if they have any questions or need anything. Patient is encouraged to be seen in the emergency room for chest pain, palpitations, shortness of breath, dizziness, severe abdominal pain or other concerning symptoms.    Please note that this dictation was created using voice recognition software. I have made every reasonable attempt to correct obvious errors, but I expect  that there are errors of grammar and possibly content that I did not discover before finalizing the note.    Assessment/Plan:  1. Pain in left hip  oxycodone immediate release (ROXICODONE) 10 MG immediate release tablet   2. Essential hypertension  lisinopril (PRINIVIL) 10 MG Tab    amlodipine (NORVASC) 5 MG Tab   3. Gastroesophageal reflux disease, esophagitis presence not specified  famotidine (PEPCID) 20 MG Tab   4. Anemia, blood loss  ferrous sulfate 325 (65 FE) MG tablet    ascorbic acid (VITAMIN C) 500 MG tablet   5. Insomnia, unspecified type     6. Gait disorder     7. Protein calorie malnutrition (CMS-HCC)     8. Controlled type 2 diabetes mellitus without complication, without long-term current use of insulin (CMS-HCC)     9. Chronic midline low back pain without sciatica     10. Drug-induced constipation     11. Iron deficiency            I have placed the below orders and discussed them with an approved delegating provider. The MA is performing the below orders under the direction of Dr. Bynum.

## 2017-07-07 NOTE — ASSESSMENT & PLAN NOTE
Chronic in nature. Stable. Patient continues to refuse follow-up with orthopedics. Patient is currently taking oxycodone 10 mg 1-2 times daily which is decreased from previous dose. Patient does have follow-up appointment with Dr. Sanders regarding this related to patient's significant fall risk, hope is that there are other options to manage patient's pain not will not carry as much as she was constipation/dizziness. Patient refuses to provide urine for drug testing.

## 2017-07-07 NOTE — ASSESSMENT & PLAN NOTE
Chronic in nature. Stable. Blood pressure today is 110/60 patient is currently taking amlodipine 5 mg daily which is discontinued at this time as well as lisinopril 10 mg daily as patient's wife has noticed with both medications patient has had some increased dizziness. Patient denies chest pain, palpitations, headache, shortness of breath or other concerning symptoms, side effects of medications.

## 2017-07-07 NOTE — ASSESSMENT & PLAN NOTE
Chronic in nature. Worsened. Patient is currently using a wheelchair post femur fracture. Continues to have falls. Patient is seeing home health as well as physical therapy for this issue. Patient is doing better with walking per report of wife.

## 2017-07-07 NOTE — ASSESSMENT & PLAN NOTE
Chronic in nature. Stable. Patient's last bowel movement was yesterday patient continues to use MiraLAX, Colace and is encouraged to use suppositories as needed if patient is not having a minimum of one bowel movement every 3 days.

## 2017-07-07 NOTE — MR AVS SNAPSHOT
"        Santosh Casas   2017 9:40 AM   Office Visit   MRN: [id number]    Department:  [facility]   Dept Phone:  [phone]    Description:  Male : 1934   Provider:  GARCIA Valentine           Reason for Visit     Hip Pain           Allergies as of 2017     Allergen Noted Reactions    Nkda [No Known Drug Allergy] 2010         You were diagnosed with     Pain in left hip   [772773]       Essential hypertension   [5642558]       Gastroesophageal reflux disease, esophagitis presence not specified   [8111112]       Anemia, blood loss   [132497]         Vital Signs     Blood Pressure Pulse Temperature Height Weight Body Mass Index    110/60 mmHg 86 36.7 °C (98 °F) 1.803 m (5' 10.98\") 49.896 kg (110 lb) 15.35 kg/m2    Oxygen Saturation Smoking Status                98% Former Smoker          Basic Information     Date Of Birth Sex Race Ethnicity Preferred Language    1934 Male White Non- English      Your appointments     2017 11:00 AM   [id number] with Shena Nation P.T.   [facility] (--)    [address]  [place] [address]   [phone]            2017  3:30 PM   MSW-HH-HOME VISIT with CARINE Baron   [facility] (--)    [address]  [place] [address]   [phone]            Jul 10, 2017 To Be Determined   OT-HH-HOME VISIT with Becky Isbell O.T.   [facility] (--)    [address]  [place] [address]   [phone]            2017 To Be Determined   OT-HH-HOME VISIT with Becky Isbell O.T.   [facility] (--)    [address]  [place] [address]   [phone]            2017 To Be Determined   SN-HH-HOME VISIT with Genesis Perkins R.N.   [facility] (--)    [address]  [place] [address]   [phone]            2017 10:15 AM   New Patient with Giovani Sanders M.D.   [facility] (--)    [address]  [place] " [id number] [phone]           Please bring Photo ID, Insurance Cards, All Medication Bottles and copies of any legal documents (such as Living Will, Power of ) If speaking a language besides English please bring an adult . Please arrive 30 minutes prior for check in and registration. You will be receiving a confirmation call a few days before your appointment from our automated call confirmation system.              Problem List              ICD-10-CM Priority Class Noted - Resolved    GOUT    6/15/2009 - Present    Depression F32.9 Low  6/15/2009 - Present    Hypothyroidism E03.9 Low  9/1/2009 - Present    Anxiety F41.9   1/25/2010 - Present    GERD (gastroesophageal reflux disease) K21.9   11/8/2010 - Present    Hypertension I10 Low  11/22/2011 - Present    Squamous cell cancer of tongue (CMS-HCC) C02.9   11/8/2012 - Present    Insomnia G47.00   4/6/2016 - Present    Gait disorder R26.9   5/11/2016 - Present    Protein calorie malnutrition (CMS-HCC) E46   12/1/2016 - Present    Controlled type 2 diabetes mellitus without complication, without long-term current use of insulin (CMS-HCC) E11.9   12/1/2016 - Present    Chronic midline low back pain without sciatica M54.5, G89.29   2/24/2017 - Present    Fall W19.XXXA   2/24/2017 - Present    Drug-induced constipation K59.03   2/24/2017 - Present    Femur fracture, left (CMS-HCC) S72.92XA   5/11/2017 - Present    Anemia, blood loss D50.0   5/12/2017 - Present    Iron deficiency E61.1   5/12/2017 - Present    Hyponatremia E87.1   5/12/2017 - Present      Health Maintenance        Date Due Completion Dates    RETINAL SCREENING 3/20/2015 3/20/2014, 3/20/2014    FASTING LIPID PROFILE 6/8/2016 6/8/2015, 6/23/2014, 4/12/2011, 11/9/2009, 1/12/2006    URINE ACR / MICROALBUMIN 6/8/2016 6/8/2015, 6/23/2014, 12/12/2012, 7/17/2009    IMM INFLUENZA (1) 2/1/2018 (Originally 9/1/2017) 11/23/2011    IMM PNEUMOCOCCAL 65+ (ADULT) LOW/MEDIUM RISK SERIES (2 of 2 -  PCV13) 7/1/2018 (Originally 11/23/2012) 11/23/2011    IMM DTaP/Tdap/Td Vaccine (1 - Tdap) 7/1/2018 (Originally 4/27/1953) ---    IMM ZOSTER VACCINE 7/1/2018 (Originally 4/27/1994) ---    A1C SCREENING 7/14/2017 1/14/2017, 8/30/2016, 1/6/2016, 6/8/2015, 6/23/2014, 12/12/2012, 11/23/2011, 4/12/2011, 2/24/2010, 11/9/2009, 7/17/2009, 1/12/2006, 1/11/2006    SERUM CREATININE 5/16/2018 5/16/2017, 5/15/2017, 5/14/2017, 5/13/2017, 5/12/2017, 5/11/2017, 1/14/2017, 11/8/2016, 6/8/2015, 8/22/2013, 8/21/2013, 7/12/2013, 12/12/2012, 12/11/2012, 5/15/2012, 5/15/2012, 11/28/2011, 11/23/2011, 11/21/2011, 2/24/2010, 12/27/2009, 1/12/2006, 1/11/2006, 1/10/2006    COLONOSCOPY 6/16/2024 6/16/2014 (N/S)    Override on 6/16/2014: (N/S)            Current Immunizations     Influenza TIV (IM) 11/23/2011 11:35 AM    Pneumococcal polysaccharide vaccine (PPSV-23) 11/23/2011 11:45 AM      Below and/or attached are the medications your provider expects you to take. Review all of your home medications and newly ordered medications with your provider and/or pharmacist. Follow medication instructions as directed by your provider and/or pharmacist. Please keep your medication list with you and share with your provider. Update the information when medications are discontinued, doses are changed, or new medications (including over-the-counter products) are added; and carry medication information at all times in the event of emergency situations     Allergies:  NKDA - (reactions not documented)               Medications  Valid as of: July 07, 2017 - 10:45 AM    Generic Name Brand Name Tablet Size Instructions for use    AmLODIPine Besylate (Tab) NORVASC 5 MG Take 1 Tab by mouth every day.        Ascorbic Acid (Tab) VITAMIN C 500 MG Take 0.5 Tabs by mouth every day. supplement for wound healing        Bisacodyl (Suppos) DULCOLAX 10 MG Insert 1 Suppository in rectum every day.        Bisacodyl (Suppos) DULCOLAX 10 MG Insert 10 mg in rectum every day.         Divalproex Sodium (TABLET SR 24 HR) DEPAKOTE  MG Take 1 Tab by mouth every day.        Docusate Sodium (Cap) COLACE 100 MG Take 1 Cap by mouth 2 times a day.        DULoxetine HCl (Cap DR Particles) CYMBALTA 60 MG Take 1 Cap by mouth every day.        Famotidine (Tab) PEPCID 20 MG Take 1 Tab by mouth 2 times a day.        Ferrous Sulfate (Tab) ferrous sulfate 325 (65 FE) MG Take 1 Tab by mouth every day. With 250 mg vit C and a stool softner such as colace        Levothyroxine Sodium (Tab) SYNTHROID 50 MCG Take 1 Tab by mouth Every morning on an empty stomach.        Lisinopril (Tab) PRINIVIL 10 MG Take 1 Tab by mouth every day.        Magnesium Citrate (Solution) Citrate of Magnesia 1.745 GM/30ML Take 150 mL by mouth every day. Until you have a BM.        MetFORMIN HCl (Tab) GLUCOPHAGE 500 MG Take 500 mg by mouth 2 times a day, with meals. for DM2 management        OxyCODONE HCl (Tab) ROXICODONE 10 MG Take 1 Tab by mouth every four hours as needed for Severe Pain.        Polyethylene Glycol 3350 (Pack) MIRALAX  Take 1 Packet by mouth every 6 hours. Until bowel movement.        QUEtiapine Fumarate (Tab) SEROQUEL 300 MG Take 1 Tab by mouth every evening.        Tamsulosin HCl (Cap) FLOMAX 0.4 MG Take 1 Cap by mouth ONE-HALF HOUR AFTER BREAKFAST.        .                 Medicines prescribed today were sent to:     [id number] [place], [place] - [address] [address] [address]    Phone: [phone] Fax: [phone]    Open 24 Hours?: No      Medication refill instructions:       If your prescription bottle indicates you have medication refills left, it is not necessary to call your provider’s office. Please contact your pharmacy and they will refill your medication.    If your prescription bottle indicates you do not have any refills left, you may request refills at any time through one of the following ways: The online [employer] system (except Urgent Care), by calling your  provider’s office, or by asking your pharmacy to contact your provider’s office with a refill request. Medication refills are processed only during regular business hours and may not be available until the next business day. Your provider may request additional information or to have a follow-up visit with you prior to refilling your medication.   *Please Note: Medication refills are assigned a new Rx number when refilled electronically. Your pharmacy may indicate that no refills were authorized even though a new prescription for the same medication is available at the pharmacy. Please request the medicine by name with the pharmacy before contacting your provider for a refill.        Other Notes About Your Plan     Please Assess and Status Chronic Disease from Current and Prior Visits.     Query: Please Assess the following Diagnosis    1. Suggested code 707.14 Ulcer of heel and midfoot. Diagnosis 11/12/2014 per Dr German Bush see Epic note entry of 11/230/2014 dated 11/12/2014. Please status and document if ulcer remains/treatment.     2.Suggested Code 296.30 Major depressive disorder, recurrent episode, unspecified degree. Please state if depression mild, moderate or severe. Long term history of Depression patient taking Xanax from 03/12/2009 with last refill 07/09/2015. Remeron 01/16/2009 with last Refill 06/10/2015 and Seroquel 01/05/2011 with last refill 06/12/2015. Please status diagnosis for these medicaitons.     3. 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

## 2017-07-07 NOTE — ASSESSMENT & PLAN NOTE
Chronic in nature. Stable. Patient continues to take metformin 500 mg daily and this is working well. Patient denies side effects from medication. Counseled wife regarding signs of high or low blood sugar. Patient has not had any issues with this. Patient refuses to check his blood sugar at home labs are ordered at this time.

## 2017-07-10 ENCOUNTER — HOME CARE VISIT (OUTPATIENT)
Dept: HOME HEALTH SERVICES | Facility: HOME HEALTHCARE | Age: 82
End: 2017-07-10
Payer: MEDICARE

## 2017-07-10 VITALS
TEMPERATURE: 98.6 F | SYSTOLIC BLOOD PRESSURE: 118 MMHG | RESPIRATION RATE: 16 BRPM | DIASTOLIC BLOOD PRESSURE: 60 MMHG | HEART RATE: 81 BPM

## 2017-07-10 PROCEDURE — G0151 HHCP-SERV OF PT,EA 15 MIN: HCPCS

## 2017-07-10 ASSESSMENT — ENCOUNTER SYMPTOMS: DIFFICULTY THINKING: 1

## 2017-07-11 ENCOUNTER — HOME CARE VISIT (OUTPATIENT)
Dept: HOME HEALTH SERVICES | Facility: HOME HEALTHCARE | Age: 82
End: 2017-07-11
Payer: MEDICARE

## 2017-07-11 VITALS
SYSTOLIC BLOOD PRESSURE: 108 MMHG | DIASTOLIC BLOOD PRESSURE: 58 MMHG | TEMPERATURE: 98.8 F | RESPIRATION RATE: 17 BRPM | HEART RATE: 83 BPM

## 2017-07-11 PROCEDURE — G0152 HHCP-SERV OF OT,EA 15 MIN: HCPCS

## 2017-07-12 ENCOUNTER — HOME CARE VISIT (OUTPATIENT)
Dept: HOME HEALTH SERVICES | Facility: HOME HEALTHCARE | Age: 82
End: 2017-07-12
Payer: MEDICARE

## 2017-07-12 PROCEDURE — G0152 HHCP-SERV OF OT,EA 15 MIN: HCPCS

## 2017-07-13 ENCOUNTER — HOME CARE VISIT (OUTPATIENT)
Dept: HOME HEALTH SERVICES | Facility: HOME HEALTHCARE | Age: 82
End: 2017-07-13
Payer: MEDICARE

## 2017-07-13 VITALS
HEART RATE: 83 BPM | DIASTOLIC BLOOD PRESSURE: 55 MMHG | SYSTOLIC BLOOD PRESSURE: 103 MMHG | RESPIRATION RATE: 17 BRPM | TEMPERATURE: 98.5 F

## 2017-07-13 VITALS
SYSTOLIC BLOOD PRESSURE: 124 MMHG | TEMPERATURE: 98.5 F | RESPIRATION RATE: 17 BRPM | DIASTOLIC BLOOD PRESSURE: 60 MMHG | HEART RATE: 81 BPM

## 2017-07-13 PROCEDURE — G0151 HHCP-SERV OF PT,EA 15 MIN: HCPCS

## 2017-07-13 SDOH — ECONOMIC STABILITY: HOUSING INSECURITY
HOME SAFETY: RECOMMENDED STEP RAIL OR RAMP INSTALLATION - WIFE REPORTS STILL PENDING MY SON CAN TAKE HIM OUT IN THE W/C JUST FINE""

## 2017-07-13 ASSESSMENT — ACTIVITIES OF DAILY LIVING (ADL)
HOME_HEALTH_OASIS: 01
OASIS_M1830: 02

## 2017-07-13 ASSESSMENT — ENCOUNTER SYMPTOMS
DIFFICULTY THINKING: 1
DIFFICULTY THINKING: 1

## 2017-07-14 SDOH — ECONOMIC STABILITY: HOUSING INSECURITY: UNSAFE APPLIANCES: 0

## 2017-07-14 SDOH — ECONOMIC STABILITY: HOUSING INSECURITY: UNSAFE COOKING RANGE AREA: 0

## 2017-07-17 ENCOUNTER — HOSPITAL ENCOUNTER (OUTPATIENT)
Dept: LAB | Facility: MEDICAL CENTER | Age: 82
End: 2017-07-17
Attending: PHYSICAL MEDICINE & REHABILITATION
Payer: MEDICARE

## 2017-07-17 ENCOUNTER — TELEPHONE (OUTPATIENT)
Dept: MEDICAL GROUP | Facility: PHYSICIAN GROUP | Age: 82
End: 2017-07-17

## 2017-07-17 ENCOUNTER — OFFICE VISIT (OUTPATIENT)
Dept: PHYSICAL MEDICINE AND REHAB | Facility: MEDICAL CENTER | Age: 82
End: 2017-07-17
Payer: MEDICARE

## 2017-07-17 VITALS
WEIGHT: 110 LBS | OXYGEN SATURATION: 96 % | DIASTOLIC BLOOD PRESSURE: 68 MMHG | HEIGHT: 69 IN | BODY MASS INDEX: 16.29 KG/M2 | HEART RATE: 89 BPM | TEMPERATURE: 97 F | SYSTOLIC BLOOD PRESSURE: 108 MMHG

## 2017-07-17 DIAGNOSIS — M25.552 LEFT HIP PAIN: ICD-10-CM

## 2017-07-17 DIAGNOSIS — M54.9 SPINAL PAIN: ICD-10-CM

## 2017-07-17 DIAGNOSIS — Z71.89 PAIN MEDICATION AGREEMENT DISCUSSED: ICD-10-CM

## 2017-07-17 DIAGNOSIS — E11.42 DIABETIC PERIPHERAL NEUROPATHY (HCC): ICD-10-CM

## 2017-07-17 DIAGNOSIS — M54.50 LUMBOSACRAL PAIN: ICD-10-CM

## 2017-07-17 DIAGNOSIS — M51.36 DDD (DEGENERATIVE DISC DISEASE), LUMBAR: ICD-10-CM

## 2017-07-17 DIAGNOSIS — Z98.890 HISTORY OF HIP SURGERY: ICD-10-CM

## 2017-07-17 DIAGNOSIS — M79.2 NERVE PAIN: ICD-10-CM

## 2017-07-17 DIAGNOSIS — S32.000S LUMBAR COMPRESSION FRACTURE, SEQUELA: ICD-10-CM

## 2017-07-17 DIAGNOSIS — M79.18 MYOFASCIAL PAIN: ICD-10-CM

## 2017-07-17 LAB
EST. AVERAGE GLUCOSE BLD GHB EST-MCNC: 120 MG/DL
HBA1C MFR BLD: 5.8 % (ref 0–5.6)

## 2017-07-17 PROCEDURE — 80307 DRUG TEST PRSMV CHEM ANLYZR: CPT

## 2017-07-17 PROCEDURE — 36415 COLL VENOUS BLD VENIPUNCTURE: CPT

## 2017-07-17 PROCEDURE — 83036 HEMOGLOBIN GLYCOSYLATED A1C: CPT

## 2017-07-17 PROCEDURE — G0480 DRUG TEST DEF 1-7 CLASSES: HCPCS

## 2017-07-17 PROCEDURE — 99204 OFFICE O/P NEW MOD 45 MIN: CPT | Performed by: PHYSICAL MEDICINE & REHABILITATION

## 2017-07-17 RX ORDER — OXYCODONE HYDROCHLORIDE 5 MG/1
5 TABLET ORAL EVERY 6 HOURS PRN
Qty: 120 TAB | Refills: 0 | Status: SHIPPED | OUTPATIENT
Start: 2017-07-17 | End: 2017-09-08

## 2017-07-17 RX ORDER — OXYCODONE HYDROCHLORIDE 5 MG/1
5 TABLET ORAL EVERY 6 HOURS PRN
Qty: 120 TAB | Refills: 0 | Status: SHIPPED | OUTPATIENT
Start: 2017-07-17 | End: 2017-07-17 | Stop reason: SDUPTHER

## 2017-07-17 RX ORDER — POLYETHYLENE GLYCOL 3350 17 G/17G
POWDER, FOR SOLUTION ORAL
COMMUNITY
Start: 2017-06-08 | End: 2017-07-17

## 2017-07-17 ASSESSMENT — ENCOUNTER SYMPTOMS
MYALGIAS: 1
BACK PAIN: 1
PHOTOPHOBIA: 0
HEMOPTYSIS: 0
SPUTUM PRODUCTION: 0
INSOMNIA: 1
FEVER: 0
DOUBLE VISION: 0
NAUSEA: 0
CHILLS: 0
PALPITATIONS: 0
ORTHOPNEA: 0
TINGLING: 1
SPEECH CHANGE: 1
VOMITING: 0

## 2017-07-17 NOTE — MR AVS SNAPSHOT
"        Santosh Casas   2017 10:15 AM   Office Visit   MRN: [id number]    Department:  [facility]   Dept Phone:  [phone]    Description:  Male : 1934   Provider:  Giovani Sanders M.D.           Reason for Visit     New Patient           Allergies as of 2017     Allergen Noted Reactions    Nkda [No Known Drug Allergy] 2010         You were diagnosed with     Nerve pain   [143240]       Diabetic peripheral neuropathy (CMS-HCC)   [663605]       Left hip pain   [867355]       History of hip surgery   [5200890]       Spinal pain   [867687]       DDD (degenerative disc disease), lumbar   [136964]       Lumbar compression fracture, sequela   [315719]       Pain medication agreement discussed   [[id number]]         Vital Signs     Blood Pressure Pulse Temperature Height Weight Body Mass Index    108/68 mmHg 89 36.1 °C (97 °F) 1.753 m (5' 9\") 49.896 kg (110 lb) 16.24 kg/m2    Oxygen Saturation Smoking Status                96% Former Smoker          Basic Information     Date Of Birth Sex Race Ethnicity Preferred Language    1934 Male White Non- English      Your appointments     Sep 14, 2017  9:45 AM   Follow Up Visit with Giovani Sanders M.D.   [facility] (--)    [address]  [place] [address] [phone]           You will be receiving a confirmation call a few days before your appointment from our automated call confirmation system.            Oct 12, 2017  9:40 AM   Established Patient with GARCIA Valentine   [facility] (--)    [address]  [address]  [place] [address] [phone]           You will be receiving a confirmation call a few days before your appointment from our automated call confirmation system.              Problem List              ICD-10-CM Priority Class Noted - Resolved    GOUT    6/15/2009 - Present    Depression F32.9 Low  6/15/2009 - Present    Hypothyroidism E03.9 Low "  9/1/2009 - Present    Anxiety F41.9   1/25/2010 - Present    GERD (gastroesophageal reflux disease) K21.9   11/8/2010 - Present    Hypertension I10 Low  11/22/2011 - Present    Squamous cell cancer of tongue (CMS-HCC) C02.9   11/8/2012 - Present    Insomnia G47.00   4/6/2016 - Present    Gait disorder R26.9   5/11/2016 - Present    Protein calorie malnutrition (CMS-HCC) E46   12/1/2016 - Present    Controlled type 2 diabetes mellitus without complication, without long-term current use of insulin (CMS-HCC) E11.9   12/1/2016 - Present    Chronic midline low back pain without sciatica M54.5, G89.29   2/24/2017 - Present    Fall W19.XXXA   2/24/2017 - Present    Drug-induced constipation K59.03   2/24/2017 - Present    Femur fracture, left (CMS-HCC) S72.92XA   5/11/2017 - Present    Anemia, blood loss D50.0   5/12/2017 - Present    Iron deficiency E61.1   5/12/2017 - Present    Hyponatremia E87.1   5/12/2017 - Present      Health Maintenance        Date Due Completion Dates    RETINAL SCREENING 3/20/2015 3/20/2014, 3/20/2014    FASTING LIPID PROFILE 6/8/2016 6/8/2015, 6/23/2014, 4/12/2011, 11/9/2009, 1/12/2006    URINE ACR / MICROALBUMIN 6/8/2016 6/8/2015, 6/23/2014, 12/12/2012, 7/17/2009    A1C SCREENING 7/14/2017 1/14/2017, 8/30/2016, 1/6/2016, 6/8/2015, 6/23/2014, 12/12/2012, 11/23/2011, 4/12/2011, 2/24/2010, 11/9/2009, 7/17/2009, 1/12/2006, 1/11/2006    IMM INFLUENZA (1) 2/1/2018 (Originally 9/1/2017) 11/23/2011    IMM PNEUMOCOCCAL 65+ (ADULT) LOW/MEDIUM RISK SERIES (2 of 2 - PCV13) 7/1/2018 (Originally 11/23/2012) 11/23/2011    IMM DTaP/Tdap/Td Vaccine (1 - Tdap) 7/1/2018 (Originally 4/27/1953) ---    IMM ZOSTER VACCINE 7/1/2018 (Originally 4/27/1994) ---    SERUM CREATININE 5/16/2018 5/16/2017, 5/15/2017, 5/14/2017, 5/13/2017, 5/12/2017, 5/11/2017, 1/14/2017, 11/8/2016, 6/8/2015, 8/22/2013, 8/21/2013, 7/12/2013, 12/12/2012, 12/11/2012, 5/15/2012, 5/15/2012, 11/28/2011, 11/23/2011, 11/21/2011, 2/24/2010,  12/27/2009, 1/12/2006, 1/11/2006, 1/10/2006    COLONOSCOPY 6/16/2024 6/16/2014 (N/S)    Override on 6/16/2014: (N/S)            Current Immunizations     Influenza TIV (IM) 11/23/2011 11:35 AM    Pneumococcal polysaccharide vaccine (PPSV-23) 11/23/2011 11:45 AM      Below and/or attached are the medications your provider expects you to take. Review all of your home medications and newly ordered medications with your provider and/or pharmacist. Follow medication instructions as directed by your provider and/or pharmacist. Please keep your medication list with you and share with your provider. Update the information when medications are discontinued, doses are changed, or new medications (including over-the-counter products) are added; and carry medication information at all times in the event of emergency situations     Allergies:  NKDA - (reactions not documented)               Medications  Valid as of: July 17, 2017 - 10:50 AM    Generic Name Brand Name Tablet Size Instructions for use    AmLODIPine Besylate (Tab) NORVASC 5 MG Take 1 Tab by mouth every day.        Ascorbic Acid (Tab) VITAMIN C 500 MG Take 0.5 Tabs by mouth every day. supplement for wound healing        Bisacodyl (Suppos) DULCOLAX 10 MG Insert 1 Suppository in rectum every day.        Bisacodyl (Suppos) DULCOLAX 10 MG Insert 10 mg in rectum every day.        Divalproex Sodium (TABLET SR 24 HR) DEPAKOTE  MG Take 1 Tab by mouth every day.        Docusate Sodium (Cap) COLACE 100 MG Take 1 Cap by mouth 2 times a day.        DULoxetine HCl (Cap DR Particles) CYMBALTA 60 MG Take 1 Cap by mouth every day.        Famotidine (Tab) PEPCID 20 MG Take 1 Tab by mouth 2 times a day.        Ferrous Sulfate (Tab) ferrous sulfate 325 (65 FE) MG Take 1 Tab by mouth every day. With 250 mg vit C and a stool softner such as colace        Levothyroxine Sodium (Tab) SYNTHROID 50 MCG Take 1 Tab by mouth Every morning on an empty stomach.        Lisinopril (Tab)  PRINIVIL 10 MG Take 1 Tab by mouth every day.        Magnesium Citrate (Solution) Citrate of Magnesia 1.745 GM/30ML Take 150 mL by mouth every day. Until you have a BM.        MetFORMIN HCl (Tab) GLUCOPHAGE 500 MG Take 500 mg by mouth 2 times a day, with meals. for DM2 management        OxyCODONE HCl (Tab) ROXICODONE 10 MG Take 1 Tab by mouth every four hours as needed for Severe Pain.        OxyCODONE HCl (Tab) ROXICODONE 5 MG Take 1 Tab by mouth every 6 hours as needed for Severe Pain.        Polyethylene Glycol 3350 (Pack) MIRALAX  Take 1 Packet by mouth every 6 hours. Until bowel movement.        QUEtiapine Fumarate (Tab) SEROQUEL 300 MG Take 1 Tab by mouth every evening.        Tamsulosin HCl (Cap) FLOMAX 0.4 MG Take 1 Cap by mouth ONE-HALF HOUR AFTER BREAKFAST.        .                 Medicines prescribed today were sent to:     [id number] - [place], [place] - [address] [address]    [address] [facility] [address]    Phone: [phone] Fax: [phone]    Open 24 Hours?: No      Medication refill instructions:       If your prescription bottle indicates you have medication refills left, it is not necessary to call your provider’s office. Please contact your pharmacy and they will refill your medication.    If your prescription bottle indicates you do not have any refills left, you may request refills at any time through one of the following ways: The online [employer] system (except Urgent Care), by calling your provider’s office, or by asking your pharmacy to contact your provider’s office with a refill request. Medication refills are processed only during regular business hours and may not be available until the next business day. Your provider may request additional information or to have a follow-up visit with you prior to refilling your medication.   *Please Note: Medication refills are assigned a new Rx number when refilled electronically. Your pharmacy may indicate that no refills were  authorized even though a new prescription for the same medication is available at the pharmacy. Please request the medicine by name with the pharmacy before contacting your provider for a refill.        Your To Do List     Future Labs/Procedures Complete By Expires    HEMOGLOBIN A1C  As directed 7/17/2018    PAIN MANAGEMENT SCRN, W/ RFLX TO QNT  As directed 7/17/2018    Comments:    Current Meds (name, sig, last dose):   Current outpatient prescriptions:   •  oxycodone immediate release, 10 mg, Oral, Q4HRS PRN, 7/17/2017  •  lisinopril, 10 mg, Oral, DAILY, 7/17/2017  •  amlodipine, 5 mg, Oral, DAILY, 7/17/2017  •  famotidine, 20 mg, Oral, BID, 7/17/2017  •  ferrous sulfate, 325 mg, Oral, DAILY, 7/17/2017  •  ascorbic acid, 250 mg, Oral, DAILY, 7/17/2017  •  bisacodyl, 10 mg, Rectal, DAILY, prn  •  polyethylene glycol/lytes, 17 g, Oral, Q6HRS, 7/17/2017  •  levothyroxine, 50 mcg, Oral, AM ES, 7/17/2017  •  metformin, 500 mg, Oral, BID WITH MEALS, 7/17/2017  •  quetiapine, 300 mg, Oral, Q EVENING, 7/17/2017  •  divalproex ER, 250 mg, Oral, DAILY, 7/17/2017  •  tamsulosin, 0.4 mg, Oral, AFTER BREAKFAST, 7/17/2017  •  duloxetine, 60 mg, Oral, DAILY, 7/17/2017  •  docusate sodium, 100 mg, Oral, BID, 7/17/2017  •  Citrate of Magnesia, 150 mL, Oral, DAILY  •  bisacodyl, 10 mg, Rectal, DAILY            Other Notes About Your Plan     Please Assess and Status Chronic Disease from Current and Prior Visits.     Query: Please Assess the following Diagnosis    1. Suggested code 707.14 Ulcer of heel and midfoot. Diagnosis 11/12/2014 per Dr German Bush see Epic note entry of 11/230/2014 dated 11/12/2014. Please status and document if ulcer remains/treatment.     2.Suggested Code 296.30 Major depressive disorder, recurrent episode, unspecified degree. Please state if depression mild, moderate or severe. Long term history of Depression patient taking Xanax from 03/12/2009 with last refill 07/09/2015. Remeron 01/16/2009 with last  Refill 06/10/2015 and Seroquel 01/05/2011 with last refill 06/12/2015. Please status diagnosis for these medicaitons.     3. 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

## 2017-07-17 NOTE — TELEPHONE ENCOUNTER
Received a request for a Depakote refill from [facility] pharmacy however Jesus states that patient was going to be seen by the geriatric specialist and patient would not run out of medication prior to being seen.  LVM for patient to call back.

## 2017-07-17 NOTE — PROGRESS NOTES
Subjective:      Santosh Casas is a 83 y.o. male who presents with New Patient      Chief complaint: Left hip pain        HPI The patient notes long history of spinal/joint/musculoskeletal pain, arthritis.     The patient's most recent history is significant for a fall in 5/2017, suffering a left femoral neck fracture undergoing ORIF. The patient had a period of postoperative therapy with home health. The patient was recently seen by the orthopedic surgeon, reviewed report from 7/2017, radial graft performed, Notes fracture is healing, referred for further therapy.    The patient notes ongoing pain most prominent on the proximal lateral aspect of the left thigh. The patient also notes left hip area pain. Pain is worse with activities, limiting standing and walking tolerance.    The patient notes history of low back pain, has prior L1 compression fracture. The patient now notes lumbosacral pain is controlled, without radicular component.    The patient notes intermittent neck and mid back pain, now controlled.    The patient notes intermittent joint/musculoskeletal pain, notes history of arthritis.    The patient notes some difficulty with sleep.    The patient notes anxiety/depression, otherwise no psychiatric disorder noted.    The patient uses a walker in the home, a manual wheelchair for community mobility.    The patient has had prior treatment with medications. He has been to physical therapy, retrial in progress. No bowel/bladder dysfunction noted. Home exercise program has been limited. The ongoing pain limits his ability to function.      The patient's wife was present for the evaluation today, assist with patient's medical care.    MEDICAL RECORDS REVIEW/DATA REVIEW: Reviewed in epic.    Records Reviewed: Reviewed referring provider notes. Reviewed orthopedic records, most recently from 7/2017.    I reviewed medications. The pain/symptomatic medications have been somewhat helpful for controlling the  pain, maintaining mood, and allowing the patient to function, including ADLs. Side effects are controlled. No aberrant behavior noted.     I reviewed  profile 7/17/2017, consistent.     I reviewed diagnostic studies:     I reviewed radiographs. Reviewed left hip radiographs per orthopedics 7/2017, hardware in place, fracture healing. Reviewed lumbar spine x-rays 10/2016. Reviewed MRI lumbar spine 9/2015. Reviewed DEXA 9/2015, revealed osteoporosis. Reviewed MRIs of brain and cervical spine 2011.    I reviewed lab studies. Reviewed CMP 5/2017. A1C is 1/2017 of 6.0.     I reviewed medical issues.     I reviewed family history: No neuromuscular disorders noted.    I reviewed social issues. Retired, prior draftsman, also in [employer]      PAST MEDICAL HISTORY:   Past Medical History   Diagnosis Date   • Psychiatric disorder      depression. insomnia   • Disease related peripheral neuropathy (CMS-HCC)    • Diabetes    • Thyroid disease    • Arthritis    • Unspecified urinary incontinence    • Dental disorder    • Urinary bladder disorder    • Bronchitis    • Coughing blood    • Sleep apnea    • Pain    • Other specified disorder of intestines      occassional constipation   • CATARACT      IOL right eye   • Constipation 8/28/2013       PAST SURGICAL HISTORY:    Past Surgical History   Procedure Laterality Date   • Cholecystectomy     • Laryngoscopy  5/18/2012     Performed by AISHA MCMILLAN at SURGERY [facility] DAY [facility]   • Esophagoscopy  5/18/2012     Performed by AISHA MCMILLAN at SURGERY [facility] DAY [facility]   • Hip dhs imhs gamma Left 5/11/2017     Procedure: HIP DHS;  Surgeon: Juan Daniel Pantoja M.D.;  Location: SURGERY [facility];  Service:        ALLERGIES:  Nkda    MEDICATIONS:    Outpatient Encounter Prescriptions as of 7/17/2017   Medication Sig Dispense Refill   • oxycodone immediate-release (ROXICODONE) 5 MG Tab Take 1 Tab by mouth every 6 hours as needed for Severe Pain. 120 Tab 0   • oxycodone  immediate release (ROXICODONE) 10 MG immediate release tablet Take 1 Tab by mouth every four hours as needed for Severe Pain. 20 Tab 0   • lisinopril (PRINIVIL) 10 MG Tab Take 1 Tab by mouth every day. 90 Tab 3   • amlodipine (NORVASC) 5 MG Tab Take 1 Tab by mouth every day. 90 Tab 3   • famotidine (PEPCID) 20 MG Tab Take 1 Tab by mouth 2 times a day. 60 Tab 3   • ferrous sulfate 325 (65 FE) MG tablet Take 1 Tab by mouth every day. With 250 mg vit C and a stool softner such as colace 30 Tab 3   • ascorbic acid (VITAMIN C) 500 MG tablet Take 0.5 Tabs by mouth every day. supplement for wound healing 30 Tab 3   • bisacodyl (DULCOLAX) 10 MG Suppos Insert 1 Suppository in rectum every day. 10 Suppository 0   • polyethylene glycol/lytes (MIRALAX) Pack Take 1 Packet by mouth every 6 hours. Until bowel movement. 30 Each 2   • levothyroxine (SYNTHROID) 50 MCG Tab Take 1 Tab by mouth Every morning on an empty stomach. 90 Tab 3   • metformin (GLUCOPHAGE) 500 MG Tab Take 500 mg by mouth 2 times a day, with meals. for DM2 management     • quetiapine (SEROQUEL) 300 MG tablet Take 1 Tab by mouth every evening. 60 Tab 3   • divalproex ER (DEPAKOTE ER) 250 MG TABLET SR 24 HR Take 1 Tab by mouth every day. 30 Tab    • tamsulosin (FLOMAX) 0.4 MG capsule Take 1 Cap by mouth ONE-HALF HOUR AFTER BREAKFAST. 30 Cap    • duloxetine (CYMBALTA) 60 MG Cap DR Particles delayed-release capsule Take 1 Cap by mouth every day. 90 Cap 3   • docusate sodium (COLACE) 100 MG Cap Take 1 Cap by mouth 2 times a day. 180 Cap 3   • [DISCONTINUED] polyethylene glycol 3350 (MIRALAX) Powder      • [DISCONTINUED] oxycodone immediate-release (ROXICODONE) 5 MG Tab Take 1 Tab by mouth every 6 hours as needed for Severe Pain. 120 Tab 0   • Magnesium Citrate (CITRATE OF MAGNESIA) 1.745 GM/30ML Solution Take 150 mL by mouth every day. Until you have a BM. 195 mL 0   • bisacodyl (DULCOLAX) 10 MG Suppos Insert 10 mg in rectum every day.       No facility-administered  "encounter medications on file as of 7/17/2017.       SOCIAL HISTORY:    Social History     Social History   • Marital Status:      Spouse Name: N/A   • Number of Children: N/A   • Years of Education: N/A     Social History Main Topics   • Smoking status: Former Smoker -- 1.00 packs/day for 10 years     Types: Cigarettes     Quit date: 01/01/1965   • Smokeless tobacco: Never Used   • Alcohol Use: No   • Drug Use: No   • Sexual Activity: Not Asked      Comment:      Other Topics Concern   •  Service No   • Blood Transfusions No   • Caffeine Concern No   • Occupational Exposure No   • Hobby Hazards No   • Sleep Concern No   • Stress Concern No   • Weight Concern Yes     losing weight    • Special Diet No   • Back Care Yes   • Exercise No   • Bike Helmet No     does not ride bike    • Seat Belt Yes   • Self-Exams No     Social History Narrative     The patient denies substance use/abuse.      Review of Systems   Constitutional: Negative for fever and chills.   HENT: Positive for hearing loss. Negative for ear discharge and ear pain.    Eyes: Negative for double vision and photophobia.   Respiratory: Negative for hemoptysis and sputum production.    Cardiovascular: Negative for palpitations and orthopnea.   Gastrointestinal: Negative for nausea and vomiting.   Genitourinary: Negative for frequency and hematuria.   Musculoskeletal: Positive for myalgias, back pain and joint pain.   Skin: Negative.    Neurological: Positive for tingling and speech change.   Endo/Heme/Allergies: Negative.    Psychiatric/Behavioral: The patient has insomnia.    All other systems reviewed and are negative.        Objective:     /68 mmHg  Pulse 89  Temp(Src) 36.1 °C (97 °F)  Ht 1.753 m (5' 9\")  Wt 49.896 kg (110 lb)  BMI 16.24 kg/m2  SpO2 96%     Physical Exam  Constitutional: Frail-appearing, no acute distress  HEENT: Normocephalic atraumatic, neck supple, no JVD noted, no masses noted, no meningeal signs " noted  Lymphadenopathy: no cervical, supraclavicular, or inguinal lymphadenopathy noted  Cardiovascular: Intact distal pulses, including at wrists and ankles, no limb swelling noted  Pulmonary: No tachypnea noted, no accessory muscle use noted, no dyspnea noted  Abdominal: Soft, nontender, exhibits no distension, no peritoneal signs, no HSM  Musculoskeletal:   Right shoulder: exhibits mild tenderness. Mild pain with range of motion testing  Left shoulder: exhibits mild tenderness. Mild pain with range of motion testing  Right hip: exhibits mild tenderness. Minimal pain with range of motion testing  Left hip: exhibits  tenderness left lateral hip/proximal thigh, healed scar. Mild pain with range of motion testing  Cervical back: exhibits mild decreased range of motion, mild tenderness and mild pain. Spurling's testing produces mild axial pain, trigger points noted  Lumbar back: exhibits mild decreased range of motion, only mild tenderness and mild pain. negative straight leg testing, trigger points noted  Thoracic: Kyphosis noted, only mild tenderness  Wrist/hand: Only mild pain with range of motion testing, negative tinel's at wrist, negative tinel's at elbows  Neurological: oriented. Cranial nerves grossly intact, normal strength. Sensation intact distally. Reflexes 1+ in upper and lower limbs, stance limited, mildly unstable,  No upper motor neuron signs evident  Skin: Skin is intact. no rashes or lesions noted  Psychiatric: normal mood and affect. speech is normal and behavior is normal for age. Judgment and thought content normal for age         Assessment/Plan:       ASSESSMENT:    1. Left hip/thigh pain, fallen 5/2017 with left femoral neck fracture status post left femur ORIF 5/2017, impaired gait, orthopedics following    - Physical therapy, activity/restrictions per orthopedics    2. Lumbosacral pain, myofascial pain, history of lumbar one compression fracture, degenerative disc disease, listhesis,  spondylosis, relatively controlled    3. Spinal/joint/musculoskeletal pain, arthritis    4. Nerve pain, diabetic peripheral neuropathy    - HEMOGLOBIN A1C; Future    5. Controlled substance agreement discussed    - PAIN MANAGEMENT SCRN, W/ RFLX TO QNT; Future    6. Multiple comorbid medical issues, with care per primary care provider, consultants      DISCUSSION/PLAN:    - I discussed management options. I reviewed symptomatic care    - I reviewed home exercise program, with medical/fall precautions, with left hip activity/restrictions per orthopedics    - The patient can consider complementary trials with acupuncture or TENS unit    - I reviewed medication monitoring. I reviewed pain medication dosing, reviewed risks and alternatives. For now, continue with current medications. I reviewed medication adjustments, including lower dosing for safety purposes.     - Pending clean drug testing, I wrote prescription for the following under the compassionate use provision:    - oxycodone immediate-release (ROXICODONE) 5 MG Tab; Take 1 Tab by mouth every 6 hours as needed for Severe Pain.  Dispense: 120 Tab; Refill: 0, the earliest date the pharmacy may fill the prescription is 7/20/2017, 2 prescriptions written for 2 month supply    - Advise patient to review concern for polypharmacy with primary care provider    - I reviewed risks, side effects, and interactions of medications, including over-the-counter medications.  I advise the patient to avoid sudafed/pseudoephedrine-type medication as well as herbs/supplements. I reviewed bowel management program. I recommend avoid use of benzodiazepines due to risk of interaction with pain medication. I advise the patient to avoid alcohol use. I reviewed the controlled substance prescribing program. I reviewed further symptomatic medications.    - I reviewed additional diagnostic options, including further/advanced imaging, electrodiagnostic testing, vascular studies, and further  lab screen    - I reviewed additional therapeutic options, including injection/interventional therapy and additional consultative input    - I reviewed psychosocial interventions    - Return in 2 months or an as-needed basis      Please note that this dictation was created using voice recognition software. I have made every reasonable attempt to correct obvious errors but there may be errors of grammar and content that I may have overlooked prior to finalization of this note.

## 2017-07-18 ENCOUNTER — HOME CARE VISIT (OUTPATIENT)
Dept: HOME HEALTH SERVICES | Facility: HOME HEALTHCARE | Age: 82
End: 2017-07-18
Payer: MEDICARE

## 2017-07-19 LAB
AMPHET CTO UR CFM-MCNC: NEGATIVE NG/ML
BARBITURATES CTO UR CFM-MCNC: NEGATIVE NG/ML
BENZODIAZ CTO UR CFM-MCNC: NEGATIVE NG/ML
BUPRENORPHINE UR-MCNC: NEGATIVE NG/ML
CANNABINOIDS CTO UR CFM-MCNC: NEGATIVE NG/ML
CARISOPRODOL UR-MCNC: NEGATIVE NG/ML
COCAINE CTO UR CFM-MCNC: NEGATIVE NG/ML
DRUG SCREEN COMMENT UR-IMP: NORMAL
ETHYL GLUCURONIDE UR QL SCN: NEGATIVE NG/ML
FENTANYL UR-MCNC: NEGATIVE NG/ML
MEPERIDINE CTO UR SCN-MCNC: NEGATIVE NG/ML
METHADONE CTO UR CFM-MCNC: NEGATIVE NG/ML
OPIATES UR QL SCN: NEGATIVE NG/ML
OXYCDOXYM URSCRN 2005102: POSITIVE NG/ML
PCP CTO UR CFM-MCNC: NEGATIVE NG/ML
PROPOXYPH CTO UR CFM-MCNC: NEGATIVE NG/ML
TAPENTADOL UR-MCNC: NEGATIVE NG/ML
TRAMADOL CTO UR SCN-MCNC: NEGATIVE NG/ML
ZOLPIDEM UR-MCNC: NEGATIVE NG/ML

## 2017-07-19 NOTE — TELEPHONE ENCOUNTER
I spoke with patient's wife and she confirmed that patient saw  on Monday 7/17/17.   is wondering why patient is taking Depakote?   is thinking his Seroquel should be decreased in dose.  I did inform patient's wife that you were under the impression  would take over management of these medications however wife does not believe he is.  Wife has an appointment with you on Friday 7/21/17 and will discuss with you then.  BRYAN

## 2017-07-21 RX ORDER — DIVALPROEX SODIUM 125 MG/1
125 TABLET, DELAYED RELEASE ORAL 3 TIMES DAILY
Qty: 90 TAB | Refills: 0 | Status: SHIPPED | OUTPATIENT
Start: 2017-07-21 | End: 2017-07-21 | Stop reason: SDUPTHER

## 2017-07-21 RX ORDER — DIVALPROEX SODIUM 125 MG/1
125 TABLET, DELAYED RELEASE ORAL 3 TIMES DAILY
Qty: 90 TAB | Refills: 0 | Status: SHIPPED | OUTPATIENT
Start: 2017-07-21 | End: 2017-08-25 | Stop reason: SDUPTHER

## 2017-07-21 NOTE — TELEPHONE ENCOUNTER
Was the patient seen in the last year in this department? No     Does patient have an active prescription for medications requested? Yes     Received Request Via: Pharmacy     PLEASE SEND THIS RX TO [facility]

## 2017-07-22 LAB
6MAM UR CFM-MCNC: <10 NG/ML
CODEINE UR CFM-MCNC: <20 NG/ML
HYDROCODONE UR CFM-MCNC: <20 NG/ML
HYDROMORPHONE UR CFM-MCNC: <20 NG/ML
MORPHINE UR CFM-MCNC: <20 NG/ML
NORHYDROCODONE UR CFM-MCNC: <20 NG/ML
NOROXYCODONE UR CFM-MCNC: >4000 NG/ML
OPIATES UR NOROXYM Q0836: POSITIVE NG/ML
OXYCODONE UR CFM-MCNC: 700 NG/ML
OXYMORPHONE UR CFM-MCNC: <20 NG/ML

## 2017-08-17 RX ORDER — QUETIAPINE FUMARATE 200 MG/1
200 TABLET, FILM COATED ORAL EVERY EVENING
Qty: 30 TAB | Refills: 0 | Status: SHIPPED | OUTPATIENT
Start: 2017-08-17 | End: 2017-08-25 | Stop reason: SDUPTHER

## 2017-08-25 ENCOUNTER — OFFICE VISIT (OUTPATIENT)
Dept: MEDICAL GROUP | Facility: PHYSICIAN GROUP | Age: 82
End: 2017-08-25
Payer: MEDICARE

## 2017-08-25 VITALS
TEMPERATURE: 97.3 F | WEIGHT: 110 LBS | HEIGHT: 60 IN | HEART RATE: 88 BPM | RESPIRATION RATE: 16 BRPM | SYSTOLIC BLOOD PRESSURE: 132 MMHG | BODY MASS INDEX: 21.6 KG/M2 | OXYGEN SATURATION: 95 % | DIASTOLIC BLOOD PRESSURE: 66 MMHG

## 2017-08-25 DIAGNOSIS — M54.50 CHRONIC MIDLINE LOW BACK PAIN WITHOUT SCIATICA: ICD-10-CM

## 2017-08-25 DIAGNOSIS — R60.0 EDEMA OF BOTH FEET: ICD-10-CM

## 2017-08-25 DIAGNOSIS — E11.9 CONTROLLED TYPE 2 DIABETES MELLITUS WITHOUT COMPLICATION, WITHOUT LONG-TERM CURRENT USE OF INSULIN (HCC): ICD-10-CM

## 2017-08-25 DIAGNOSIS — F03.91 DEMENTIA WITH BEHAVIORAL DISTURBANCE, UNSPECIFIED DEMENTIA TYPE: ICD-10-CM

## 2017-08-25 DIAGNOSIS — G89.29 CHRONIC MIDLINE LOW BACK PAIN WITHOUT SCIATICA: ICD-10-CM

## 2017-08-25 DIAGNOSIS — I10 ESSENTIAL HYPERTENSION: ICD-10-CM

## 2017-08-25 PROBLEM — F03.918 DEMENTIA WITH BEHAVIORAL DISTURBANCE (HCC): Status: ACTIVE | Noted: 2017-08-25

## 2017-08-25 PROCEDURE — 99214 OFFICE O/P EST MOD 30 MIN: CPT | Performed by: NURSE PRACTITIONER

## 2017-08-25 RX ORDER — QUETIAPINE FUMARATE 200 MG/1
200 TABLET, FILM COATED ORAL EVERY EVENING
Qty: 30 TAB | Refills: 0 | Status: SHIPPED | OUTPATIENT
Start: 2017-08-25 | End: 2017-09-08 | Stop reason: SDUPTHER

## 2017-08-25 RX ORDER — DIVALPROEX SODIUM 125 MG/1
125 TABLET, DELAYED RELEASE ORAL 2 TIMES DAILY
Qty: 60 TAB | Refills: 0 | Status: SHIPPED | OUTPATIENT
Start: 2017-08-25 | End: 2017-11-27

## 2017-08-25 ASSESSMENT — PATIENT HEALTH QUESTIONNAIRE - PHQ9: CLINICAL INTERPRETATION OF PHQ2 SCORE: 0

## 2017-08-25 NOTE — ASSESSMENT & PLAN NOTE
This is a chronic problem. Patient was evaluated by geriatrics recently and was recommended to decrease Seroquel from 300 to 200 mg daily patient was also recommended to discontinue Depakote. At this time we decreased Depakote to 125mg twice daily related to violent behaviors. Plan to see patient for short interval follow-up recommended to edema will discuss this medication management further at that time. Plan to try and get patient off Depakote related to need for monitoring which patient is unwilling to complete.

## 2017-08-25 NOTE — ASSESSMENT & PLAN NOTE
Chronic in nature. Stable. Patient is currently taking oxycodone 5 mg twice daily as needed for pain, patient is following up with Dr. Sanders pain management. He does plan to continue discuss other management options with this provider related to increased heart rate, constipation.

## 2017-08-25 NOTE — MR AVS SNAPSHOT
Santosh Casas   2017 9:20 AM   Office Visit   MRN: [id number]    Department:  [facility]   Dept Phone:  [phone]    Description:  Male : 1934   Provider:  GARCIA Valentine           Reason for Visit     Follow-Up follow up on medications      Allergies as of 2017     Allergen Noted Reactions    Nkda [No Known Drug Allergy] 2010         You were diagnosed with     Controlled type 2 diabetes mellitus without complication, without long-term current use of insulin (CMS-[facility])   [0820313]       Dementia with behavioral disturbance, unspecified dementia type   [[id number]]         Vital Signs     Blood Pressure Pulse Temperature Respirations Height Weight    132/66 mmHg 88 36.3 °C (97.3 °F) 16 1.524 m (5') 49.896 kg (110 lb)    Body Mass Index Oxygen Saturation Smoking Status             21.48 kg/m2 95% Former Smoker         Basic Information     Date Of Birth Sex Race Ethnicity Preferred Language    1934 Male White Non- English      Your appointments     Sep 08, 2017  9:40 AM   Established Patient with GARCIA Valentine   [email or web address] (--)    [address]  [address]  [place] [address] [phone]           You will be receiving a confirmation call a few days before your appointment from our automated call confirmation system.            Sep 14, 2017  9:45 AM   Follow Up Visit with Giovani Sanders M.D.   [facility] (--)    [address] [facility] [[email or web address] [address]  [place] [address] [phone]           You will be receiving a confirmation call a few days before your appointment from our automated call confirmation system.            Oct 12, 2017  9:40 AM   Established Patient with GARCIA Valentine   [facility] [email or web address] (--)    [email or web address]  [address]  [place] [id number] [phone]           You will be receiving a confirmation call a few days before your appointment from  our automated call confirmation system.              Problem List              ICD-10-CM Priority Class Noted - Resolved    GOUT    6/15/2009 - Present    Depression F32.9 Low  6/15/2009 - Present    Hypothyroidism E03.9 Low  9/1/2009 - Present    Anxiety F41.9   1/25/2010 - Present    GERD (gastroesophageal reflux disease) K21.9   11/8/2010 - Present    Hypertension I10 Low  11/22/2011 - Present    Squamous cell cancer of tongue (CMS-HCC) C02.9   11/8/2012 - Present    Insomnia G47.00   4/6/2016 - Present    Gait disorder R26.9   5/11/2016 - Present    Protein calorie malnutrition (CMS-HCC) E46   12/1/2016 - Present    Controlled type 2 diabetes mellitus without complication, without long-term current use of insulin (CMS-HCC) E11.9   12/1/2016 - Present    Chronic midline low back pain without sciatica M54.5, G89.29   2/24/2017 - Present    Fall W19.XXXA   2/24/2017 - Present    Drug-induced constipation K59.03   2/24/2017 - Present    Femur fracture, left (CMS-HCC) S72.92XA   5/11/2017 - Present    Anemia, blood loss D50.0   5/12/2017 - Present    Iron deficiency E61.1   5/12/2017 - Present    Hyponatremia E87.1   5/12/2017 - Present      Health Maintenance        Date Due Completion Dates    RETINAL SCREENING 3/20/2015 3/20/2014, 3/20/2014    FASTING LIPID PROFILE 6/8/2016 6/8/2015, 6/23/2014, 4/12/2011, 11/9/2009, 1/12/2006    URINE ACR / MICROALBUMIN 6/8/2016 6/8/2015, 6/23/2014, 12/12/2012, 7/17/2009    IMM INFLUENZA (1) 2/1/2018 (Originally 9/1/2017) 11/23/2011    IMM PNEUMOCOCCAL 65+ (ADULT) LOW/MEDIUM RISK SERIES (2 of 2 - PCV13) 7/1/2018 (Originally 11/23/2012) 11/23/2011    IMM DTaP/Tdap/Td Vaccine (1 - Tdap) 7/1/2018 (Originally 4/27/1953) ---    IMM ZOSTER VACCINE 7/1/2018 (Originally 4/27/1994) ---    A1C SCREENING 1/17/2018 7/17/2017, 1/14/2017, 8/30/2016, 1/6/2016, 6/8/2015, 6/23/2014, 12/12/2012, 11/23/2011, 4/12/2011, 2/24/2010, 11/9/2009, 7/17/2009, 1/12/2006, 1/11/2006    SERUM CREATININE  5/16/2018 5/16/2017, 5/15/2017, 5/14/2017, 5/13/2017, 5/12/2017, 5/11/2017, 1/14/2017, 11/8/2016, 6/8/2015, 8/22/2013, 8/21/2013, 7/12/2013, 12/12/2012, 12/11/2012, 5/15/2012, 5/15/2012, 11/28/2011, 11/23/2011, 11/21/2011, 2/24/2010, 12/27/2009, 1/12/2006, 1/11/2006, 1/10/2006    COLONOSCOPY 6/16/2024 6/16/2014 (N/S)    Override on 6/16/2014: (N/S)            Current Immunizations     Influenza TIV (IM) 11/23/2011 11:35 AM    Pneumococcal polysaccharide vaccine (PPSV-23) 11/23/2011 11:45 AM      Below and/or attached are the medications your provider expects you to take. Review all of your home medications and newly ordered medications with your provider and/or pharmacist. Follow medication instructions as directed by your provider and/or pharmacist. Please keep your medication list with you and share with your provider. Update the information when medications are discontinued, doses are changed, or new medications (including over-the-counter products) are added; and carry medication information at all times in the event of emergency situations     Allergies:  NKDA - (reactions not documented)               Medications  Valid as of: August 25, 2017 -  9:43 AM    Generic Name Brand Name Tablet Size Instructions for use    AmLODIPine Besylate (Tab) NORVASC 5 MG Take 1 Tab by mouth every day.        Ascorbic Acid (Tab) VITAMIN C 500 MG Take 0.5 Tabs by mouth every day. supplement for wound healing        Bisacodyl (Suppos) DULCOLAX 10 MG Insert 1 Suppository in rectum every day.        Bisacodyl (Suppos) DULCOLAX 10 MG Insert 10 mg in rectum every day.        Divalproex Sodium (Tablet Delayed Response) DEPAKOTE 125 MG Take 1 Tab by mouth 2 Times a Day.        Docusate Sodium (Cap) COLACE 100 MG Take 1 Cap by mouth 2 times a day.        DULoxetine HCl (Cap DR Particles) CYMBALTA 60 MG Take 1 Cap by mouth every day.        Famotidine (Tab) PEPCID 20 MG Take 1 Tab by mouth 2 times a day.        Ferrous Sulfate (Tab)  ferrous sulfate 325 (65 Fe) MG Take 1 Tab by mouth every day. With 250 mg vit C and a stool softner such as colace        Levothyroxine Sodium (Tab) SYNTHROID 50 MCG Take 1 Tab by mouth Every morning on an empty stomach.        Lisinopril (Tab) PRINIVIL 10 MG Take 1 Tab by mouth every day.        Magnesium Citrate (Solution) Citrate of Magnesia 1.745 GM/30ML Take 150 mL by mouth every day. Until you have a BM.        MetFORMIN HCl (Tab) GLUCOPHAGE 500 MG Take 500 mg by mouth 2 times a day, with meals. for DM2 management        OxyCODONE HCl (Tab) ROXICODONE 5 MG Take 1 Tab by mouth every 6 hours as needed for Severe Pain.        Polyethylene Glycol 3350 (Pack) MIRALAX  Take 1 Packet by mouth every 6 hours. Until bowel movement.        QUEtiapine Fumarate (Tab) SEROQUEL 200 MG Take 1 Tab by mouth every evening.        Tamsulosin HCl (Cap) FLOMAX 0.4 MG Take 1 Cap by mouth ONE-HALF HOUR AFTER BREAKFAST.        .                 Medicines prescribed today were sent to:     [id number] - [place], [place] - [address] [facility]    [id number] [place] [address]    Phone: [phone] Fax: [phone]    Open 24 Hours?: No      Medication refill instructions:       If your prescription bottle indicates you have medication refills left, it is not necessary to call your provider’s office. Please contact your pharmacy and they will refill your medication.    If your prescription bottle indicates you do not have any refills left, you may request refills at any time through one of the following ways: The online [employer] system (except Urgent Care), by calling your provider’s office, or by asking your pharmacy to contact your provider’s office with a refill request. Medication refills are processed only during regular business hours and may not be available until the next business day. Your provider may request additional information or to have a follow-up visit with you prior to refilling your medication.   *Please  Note: Medication refills are assigned a new Rx number when refilled electronically. Your pharmacy may indicate that no refills were authorized even though a new prescription for the same medication is available at the pharmacy. Please request the medicine by name with the pharmacy before contacting your provider for a refill.        Your To Do List     Future Labs/Procedures Complete By Expires    MICROALBUMIN CREAT RATIO URINE  As directed 8/25/2018      Referral     A referral request has been sent to our patient care coordination department. Please allow 3-5 business days for us to process this request and contact you either by phone or mail. If you do not hear from us by the 5th business day, please call us at [phone].        Other Notes About Your Plan     Please Assess and Status Chronic Disease from Current and Prior Visits.     Query: Please Assess the following Diagnosis    1. Suggested code 707.14 Ulcer of heel and midfoot. Diagnosis 11/12/2014 per Dr German Bush see Epic note entry of 11/230/2014 dated 11/12/2014. Please status and document if ulcer remains/treatment.     2.Suggested Code 296.30 Major depressive disorder, recurrent episode, unspecified degree. Please state if depression mild, moderate or severe. Long term history of Depression patient taking Xanax from 03/12/2009 with last refill 07/09/2015. Remeron 01/16/2009 with last Refill 06/10/2015 and Seroquel 01/05/2011 with last refill 06/12/2015. Please status diagnosis for these medicaitons.     3. 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

## 2017-08-25 NOTE — ASSESSMENT & PLAN NOTE
Chronic in nature. Stable. Patient continues to take metformin 500 mg daily and this is working well for him. Patient denies side effects on this medication. Hemoglobin A1c is 5.8. Counseled patient and wife regarding signs of low blood sugar. Patient refuses to check blood sugar at home. Updated lipid panel and microalbumin creatinine ratio ordered at this time. Patient refuses to schedule retina screening.

## 2017-08-25 NOTE — ASSESSMENT & PLAN NOTE
Chronic in nature. Stable. Blood pressure today is 132/66. Patient continues to take amlodipine and lisinopril as prescribed without side effects. Patient denies chest pain, palpitations, dizziness, headache, shortness of breath.

## 2017-08-25 NOTE — PROGRESS NOTES
Chief Complaint   Patient presents with   • Follow-Up     follow up on medications       HISTORY OF PRESENT ILLNESS: Patient is a 83 y.o. male established patient who presents today to discuss multiple issues.    Controlled type 2 diabetes mellitus without complication, without long-term current use of insulin (CMS-[facility])  Chronic in nature. Stable. Patient continues to take metformin 500 mg daily and this is working well for him. Patient denies side effects on this medication. Hemoglobin A1c is 5.8. Counseled patient and wife regarding signs of low blood sugar. Patient refuses to check blood sugar at home. Updated lipid panel and microalbumin creatinine ratio ordered at this time. Patient refuses to schedule retina screening.    Chronic midline low back pain without sciatica  Chronic in nature. Stable. Patient is currently taking oxycodone 5 mg twice daily as needed for pain, patient is following up with Dr. Sanders pain management. He does plan to continue discuss other management options with this provider related to increased heart rate, constipation.    Dementia with behavioral disturbance  This is a chronic problem. Patient was evaluated by geriatrics recently and was recommended to decrease Seroquel from 300 to 200 mg daily patient was also recommended to discontinue Depakote. At this time we decreased Depakote to 125mg twice daily related to violent behaviors. Plan to see patient for short interval follow-up recommended to edema will discuss this medication management further at that time. Plan to try and get patient off Depakote related to need for monitoring which patient is unwilling to complete.    Edema of both feet  This is a new problem. Patient states that over the last couple of weeks he has noticed swelling in his feet. Denies pain. Patient's wife states that he has been much less active and has had his feet in a dependent position pretty much consistently for the last 3-4 weeks. Patient refuses  therapy, is refusing to get up and walk. Patient is not elevating his feet or using compression stockings at this time. She states he has not noticed anything that makes it better although he does notice that his feet are normal in the morning and become more swollen throughout the day.    Hypertension  Chronic in nature. Stable. Blood pressure today is 132/66. Patient continues to take amlodipine and lisinopril as prescribed without side effects. Patient denies chest pain, palpitations, dizziness, headache, shortness of breath.      Patient Active Problem List    Diagnosis Date Noted   • Hypertension 11/22/2011     Priority: Low   • Hypothyroidism 09/01/2009     Priority: Low   • Depression 06/15/2009     Priority: Low   • Dementia with behavioral disturbance 08/25/2017   • Edema of both feet 08/25/2017   • Anemia, blood loss 05/12/2017   • Iron deficiency 05/12/2017   • Hyponatremia 05/12/2017   • Femur fracture, left (CMS-HCC) 05/11/2017   • Chronic midline low back pain without sciatica 02/24/2017   • Fall 02/24/2017   • Drug-induced constipation 02/24/2017   • Protein calorie malnutrition (CMS-HCC) 12/01/2016   • Controlled type 2 diabetes mellitus without complication, without long-term current use of insulin (CMS-HCC) 12/01/2016   • Gait disorder 05/11/2016   • Insomnia 04/06/2016   • Squamous cell cancer of tongue (CMS-HCC) 11/08/2012   • GERD (gastroesophageal reflux disease) 11/08/2010   • Anxiety 01/25/2010   • GOUT 06/15/2009       Allergies:Nkda    Current Outpatient Prescriptions   Medication Sig Dispense Refill   • quetiapine (SEROQUEL) 200 MG Tab Take 1 Tab by mouth every evening. 30 Tab 0   • divalproex (DEPAKOTE) 125 MG EC tablet Take 1 Tab by mouth 2 Times a Day. 60 Tab 0   • oxycodone immediate-release (ROXICODONE) 5 MG Tab Take 1 Tab by mouth every 6 hours as needed for Severe Pain. 120 Tab 0   • lisinopril (PRINIVIL) 10 MG Tab Take 1 Tab by mouth every day. 90 Tab 3   • amlodipine (NORVASC) 5  MG Tab Take 1 Tab by mouth every day. 90 Tab 3   • famotidine (PEPCID) 20 MG Tab Take 1 Tab by mouth 2 times a day. 60 Tab 3   • ferrous sulfate 325 (65 FE) MG tablet Take 1 Tab by mouth every day. With 250 mg vit C and a stool softner such as colace 30 Tab 3   • ascorbic acid (VITAMIN C) 500 MG tablet Take 0.5 Tabs by mouth every day. supplement for wound healing 30 Tab 3   • Magnesium Citrate (CITRATE OF MAGNESIA) 1.745 GM/30ML Solution Take 150 mL by mouth every day. Until you have a BM. 195 mL 0   • bisacodyl (DULCOLAX) 10 MG Suppos Insert 10 mg in rectum every day.     • bisacodyl (DULCOLAX) 10 MG Suppos Insert 1 Suppository in rectum every day. 10 Suppository 0   • polyethylene glycol/lytes (MIRALAX) Pack Take 1 Packet by mouth every 6 hours. Until bowel movement. 30 Each 2   • levothyroxine (SYNTHROID) 50 MCG Tab Take 1 Tab by mouth Every morning on an empty stomach. 90 Tab 3   • metformin (GLUCOPHAGE) 500 MG Tab Take 500 mg by mouth 2 times a day, with meals. for DM2 management     • tamsulosin (FLOMAX) 0.4 MG capsule Take 1 Cap by mouth ONE-HALF HOUR AFTER BREAKFAST. 30 Cap    • duloxetine (CYMBALTA) 60 MG Cap DR Particles delayed-release capsule Take 1 Cap by mouth every day. 90 Cap 3   • docusate sodium (COLACE) 100 MG Cap Take 1 Cap by mouth 2 times a day. 180 Cap 3     No current facility-administered medications for this visit.       Social History   Substance Use Topics   • Smoking status: Former Smoker -- 1.00 packs/day for 10 years     Types: Cigarettes     Quit date: 01/01/1965   • Smokeless tobacco: Never Used   • Alcohol Use: No       No family status information on file.     Family History   Problem Relation Age of Onset   • Heart Disease Father    • Diabetes Neg Hx    • Stroke Neg Hx    • Cancer Neg Hx      stomach       Review of Systems:   Constitutional:  Negative for fever, chills, weight loss and malaise/fatigue.   HEENT:  Negative for ear pain, nosebleeds, congestion, sore throat and neck  pain.    Eyes:  Negative for blurred vision.   Respiratory:  Negative for cough, sputum production, shortness of breath and wheezing.    Cardiovascular:  Negative for chest pain, palpitations, orthopnea and positive leg swelling.   Gastrointestinal:  Negative for heartburn, nausea, vomiting and abdominal pain.   Genitourinary:  Negative for dysuria, urgency and frequency.   Musculoskeletal: positive for myalgias, back pain and joint pain.   Skin:  Negative for rash and itching.   Neurological:  Negative for dizziness, tingling, tremors, sensory change, focal weakness and headaches.   Endo/Heme/Allergies:  Does not bruise/bleed easily.   Psychiatric/Behavioral:  Negative for depression, suicidal ideas and memory loss.  The patient is not nervous/anxious and does not have insomnia.    All other systems reviewed and are negative except as in HPI.    Exam:  Blood pressure 132/66, pulse 88, temperature 36.3 °C (97.3 °F), resp. rate 16, height 1.524 m (5'), weight 49.896 kg (110 lb), SpO2 95 %.  General:  Normal appearing. No distress.  Pulmonary:  Clear to ausculation.  Normal effort. No rales, ronchi, or wheezing.  Cardiovascular:  Regular rate and rhythm without murmur. Carotid and radial pulses are intact and equal bilaterally.  Neurologic:  Grossly nonfocal  Skin:  Warm and dry.  No obvious lesions.  Musculoskeletal:  Normal gait. No extremity cyanosis, clubbing, . Patient does have bilateral 2+ pedal edema. Pedal pulses are 2+ and capillary refills less than 2 seconds at this time. Patient does continue to have pain to palpation of left hip, lumbar spine. Patient is uncooperative with assessment.  Psych:  Normal mood and affect. Alert and oriented x3. Judgment and insight is normal.      PLAN:    1. Controlled type 2 diabetes mellitus without complication, without long-term current use of insulin (CMS-[facility])  Metformin 500 mg twice daily.  - LIPID PANEL  - MICROALBUMIN CREAT RATIO URINE; Future    2. Dementia with  behavioral disturbance, unspecified dementia type  Decrease Seroquel dose, decrease Depakote dose plan to discuss in 2 weeks. Consider twice daily Seroquel dosing and removal of Depakote.  Counseled regarding increased risk of fall related to these medications.  - quetiapine (SEROQUEL) 200 MG Tab; Take 1 Tab by mouth every evening.  Dispense: 30 Tab; Refill: 0  - divalproex (DEPAKOTE) 125 MG EC tablet; Take 1 Tab by mouth 2 Times a Day.  Dispense: 60 Tab; Refill: 0  - REFERRAL TO BEHAVIORAL HEALTH    3. Edema of both feet  Encouraged patient to increase activity as patient has been very sedentary and keeping his feet dependent constantly.  Encouraged elevating feet, use of compression stockings.  Encouraged follow-up in the emergency room for increasing symptoms, shortness of breath.  Follow-up in 2 weeks     4. Chronic midline low back pain without sciatica  Continue oxycodone as prescribed by pain management continue follow-up with pain management.    5. Essential hypertension  Lisinopril 10 mg daily  Amlodipine 5 mg daily    Follow-up in 2 weeks regarding bilateral lower extremity edema. Patient is encouraged to be seen in the emergency room for chest pain, palpitations, shortness of breath, dizziness, severe abdominal pain or other concerning symptoms.    Please note that this dictation was created using voice recognition software. I have made every reasonable attempt to correct obvious errors, but I expect that there are errors of grammar and possibly content that I did not discover before finalizing the note.    Assessment/Plan:  1. Controlled type 2 diabetes mellitus without complication, without long-term current use of insulin (CMS-[facility])  LIPID PANEL    MICROALBUMIN CREAT RATIO URINE   2. Dementia with behavioral disturbance, unspecified dementia type  quetiapine (SEROQUEL) 200 MG Tab    divalproex (DEPAKOTE) 125 MG EC tablet    REFERRAL TO BEHAVIORAL HEALTH   3. Edema of both feet     4. Chronic midline low  back pain without sciatica     5. Essential hypertension            I have placed the below orders and discussed them with an approved delegating provider. The MA is performing the below orders under the direction of Dr. Townsend.

## 2017-08-25 NOTE — ASSESSMENT & PLAN NOTE
This is a new problem. Patient states that over the last couple of weeks he has noticed swelling in his feet. Denies pain. Patient's wife states that he has been much less active and has had his feet in a dependent position pretty much consistently for the last 3-4 weeks. Patient refuses therapy, is refusing to get up and walk. Patient is not elevating his feet or using compression stockings at this time. She states he has not noticed anything that makes it better although he does notice that his feet are normal in the morning and become more swollen throughout the day.

## 2017-09-08 ENCOUNTER — OFFICE VISIT (OUTPATIENT)
Dept: MEDICAL GROUP | Facility: PHYSICIAN GROUP | Age: 82
End: 2017-09-08
Payer: MEDICARE

## 2017-09-08 VITALS
HEART RATE: 80 BPM | HEIGHT: 60 IN | OXYGEN SATURATION: 100 % | SYSTOLIC BLOOD PRESSURE: 126 MMHG | DIASTOLIC BLOOD PRESSURE: 80 MMHG | RESPIRATION RATE: 16 BRPM | TEMPERATURE: 97.7 F

## 2017-09-08 DIAGNOSIS — F03.91 DEMENTIA WITH BEHAVIORAL DISTURBANCE, UNSPECIFIED DEMENTIA TYPE: ICD-10-CM

## 2017-09-08 DIAGNOSIS — R60.0 EDEMA OF BOTH FEET: ICD-10-CM

## 2017-09-08 DIAGNOSIS — I10 ESSENTIAL HYPERTENSION: ICD-10-CM

## 2017-09-08 PROCEDURE — 99214 OFFICE O/P EST MOD 30 MIN: CPT | Performed by: NURSE PRACTITIONER

## 2017-09-08 RX ORDER — OXYCODONE HYDROCHLORIDE 10 MG/1
10 TABLET ORAL DAILY
COMMUNITY
Start: 2017-07-07 | End: 2017-09-14

## 2017-09-08 RX ORDER — QUETIAPINE FUMARATE 200 MG/1
200 TABLET, FILM COATED ORAL 2 TIMES DAILY
Qty: 60 TAB | Refills: 0 | Status: SHIPPED | OUTPATIENT
Start: 2017-09-08 | End: 2017-11-27 | Stop reason: SDUPTHER

## 2017-09-08 ASSESSMENT — PAIN SCALES - GENERAL: PAINLEVEL: NO PAIN

## 2017-09-08 NOTE — ASSESSMENT & PLAN NOTE
Chronic in nature. Stable. Plan to wean patient off Depakote at this time patient will take one 125 mg one time daily if patient continues to be stable without painters on this dosage of medication plan one. Acute Depakote 125 mg every other day patient will add a second dose of Seroquel at that time to evaluate whether or not this causes sedation. Plan to recheck a Depakote level at next follow-up. If patient is able to take Seroquel twice daily without violent behaviors, sedation plan will be to keep patient on Seroquel, as patient is uncooperative with blood draws. Will also try and decrease dose of Seroquel further considering increased risk of falls with this medication.

## 2017-09-08 NOTE — ASSESSMENT & PLAN NOTE
Chronic in nature. Stable. Blood pressure today is 126/80. Patient continues to take medications as prescribed without side effects. Patient denies chest pain, palpitations, headache, dizziness, shortness of breath.

## 2017-09-08 NOTE — PROGRESS NOTES
Chief Complaint   Patient presents with   • Foot Swelling     Follow Up        HISTORY OF PRESENT ILLNESS: Patient is a 83 y.o. male established patient who presents today to discuss multiple issues.    Dementia with behavioral disturbance  Chronic in nature. Stable. Plan to wean patient off Depakote at this time patient will take one 125 mg one time daily if patient continues to be stable without painters on this dosage of medication plan one. Acute Depakote 125 mg every other day patient will add a second dose of Seroquel at that time to evaluate whether or not this causes sedation. Plan to recheck a Depakote level at next follow-up. If patient is able to take Seroquel twice daily without violent behaviors, sedation plan will be to keep patient on Seroquel, as patient is uncooperative with blood draws. Will also try and decrease dose of Seroquel further considering increased risk of falls with this medication.    Edema of both feet  Patient states that following is much improved. Continues to refuse therapy, refuses to walk. Patient has been elevating his feet, using compression stockings daily. States that elevating his feet frequently keeps the swelling down. Patient is not having any shortness of breath, cough or other concerning symptoms.    Hypertension  Chronic in nature. Stable. Blood pressure today is 126/80. Patient continues to take medications as prescribed without side effects. Patient denies chest pain, palpitations, headache, dizziness, shortness of breath.      Patient Active Problem List    Diagnosis Date Noted   • Hypertension 11/22/2011     Priority: Low   • Hypothyroidism 09/01/2009     Priority: Low   • Depression 06/15/2009     Priority: Low   • Dementia with behavioral disturbance 08/25/2017   • Edema of both feet 08/25/2017   • Anemia, blood loss 05/12/2017   • Iron deficiency 05/12/2017   • Hyponatremia 05/12/2017   • Femur fracture, left (CMS-HCC) 05/11/2017   • Chronic midline low back  pain without sciatica 02/24/2017   • Fall 02/24/2017   • Drug-induced constipation 02/24/2017   • Protein calorie malnutrition (CMS-HCC) 12/01/2016   • Controlled type 2 diabetes mellitus without complication, without long-term current use of insulin (CMS-HCC) 12/01/2016   • Gait disorder 05/11/2016   • Insomnia 04/06/2016   • Squamous cell cancer of tongue (CMS-HCC) 11/08/2012   • GERD (gastroesophageal reflux disease) 11/08/2010   • Anxiety 01/25/2010   • GOUT 06/15/2009       Allergies:Nkda [no known drug allergy]    Current Outpatient Prescriptions   Medication Sig Dispense Refill   • quetiapine (SEROQUEL) 200 MG Tab Take 1 Tab by mouth 2 times a day. 60 Tab 0   • lisinopril (PRINIVIL) 10 MG Tab Take 1 Tab by mouth every day. 90 Tab 3   • amlodipine (NORVASC) 5 MG Tab Take 1 Tab by mouth every day. 90 Tab 3   • famotidine (PEPCID) 20 MG Tab Take 1 Tab by mouth 2 times a day. 60 Tab 3   • ferrous sulfate 325 (65 FE) MG tablet Take 1 Tab by mouth every day. With 250 mg vit C and a stool softner such as colace 30 Tab 3   • ascorbic acid (VITAMIN C) 500 MG tablet Take 0.5 Tabs by mouth every day. supplement for wound healing 30 Tab 3   • bisacodyl (DULCOLAX) 10 MG Suppos Insert 1 Suppository in rectum every day. 10 Suppository 0   • polyethylene glycol/lytes (MIRALAX) Pack Take 1 Packet by mouth every 6 hours. Until bowel movement. 30 Each 2   • levothyroxine (SYNTHROID) 50 MCG Tab Take 1 Tab by mouth Every morning on an empty stomach. 90 Tab 3   • metformin (GLUCOPHAGE) 500 MG Tab Take 500 mg by mouth 2 times a day, with meals. for DM2 management     • tamsulosin (FLOMAX) 0.4 MG capsule Take 1 Cap by mouth ONE-HALF HOUR AFTER BREAKFAST. 30 Cap    • duloxetine (CYMBALTA) 60 MG Cap DR Particles delayed-release capsule Take 1 Cap by mouth every day. 90 Cap 3   • docusate sodium (COLACE) 100 MG Cap Take 1 Cap by mouth 2 times a day. 180 Cap 3   • oxycodone immediate release (ROXICODONE) 10 MG immediate release tablet  Take 10 mg by mouth every day.     • divalproex (DEPAKOTE) 125 MG EC tablet Take 1 Tab by mouth 2 Times a Day. 60 Tab 0   • Magnesium Citrate (CITRATE OF MAGNESIA) 1.745 GM/30ML Solution Take 150 mL by mouth every day. Until you have a BM. 195 mL 0   • bisacodyl (DULCOLAX) 10 MG Suppos Insert 10 mg in rectum every day.       No current facility-administered medications for this visit.        Social History   Substance Use Topics   • Smoking status: Former Smoker     Packs/day: 1.00     Years: 10.00     Types: Cigarettes     Quit date: 1/1/1965   • Smokeless tobacco: Never Used   • Alcohol use No       Family Status   Relation Status   • Father    • Neg Hx      Family History   Problem Relation Age of Onset   • Heart Disease Father    • Diabetes Neg Hx    • Stroke Neg Hx    • Cancer Neg Hx      stomach       Review of Systems:   Constitutional:  Negative for fever, chills, weight loss and malaise/fatigue.   HEENT:  Negative for ear pain, nosebleeds, congestion, sore throat and neck pain.    Eyes:  Negative for blurred vision.   Respiratory:  Negative for cough, sputum production, shortness of breath and wheezing.    Cardiovascular:  Negative for chest pain, palpitations, orthopnea and leg swelling.   Gastrointestinal:  Negative for heartburn, nausea, vomiting and abdominal pain.   Genitourinary:  Negative for dysuria, urgency and frequency.   Musculoskeletal:  Negative for myalgias, back pain and joint pain.   Skin:  Negative for rash and itching.   Neurological:  Negative for dizziness, tingling, tremors, sensory change, focal weakness and headaches.   Endo/Heme/Allergies:  Does not bruise/bleed easily.   Psychiatric/Behavioral:  Negative for depression, suicidal ideas and memory loss.  The patient is not nervous/anxious and does not have insomnia.    All other systems reviewed and are negative except as in HPI.    Exam:  Blood pressure 126/80, pulse 80, temperature 36.5 °C (97.7 °F), resp. rate 16, height 1.524 m  (5'), SpO2 100 %.  General:  Normal appearing. No distress.  Pulmonary:  Clear to ausculation.  Normal effort. No rales, ronchi, or wheezing.  Cardiovascular:  Regular rate and rhythm without murmur. Carotid and radial pulses are intact and equal bilaterally.  Neurologic:  Grossly nonfocal  Skin:  Warm and dry.  No obvious lesions.  Musculoskeletal:  Normal gait. No extremity cyanosis, clubbing. 1+ pitting bilateral edema, feet are cool to touch, 2+ pedal pulses, cap refill less than 3 seconds.  Psych:  Normal mood and affect. Patient is not alert and oriented. Judgment and insight is normal.      PLAN:    1. Dementia with behavioral disturbance, unspecified dementia type  Plan to wean patient off Depakote at this time patient will take.  Depakote 125 mg ×1 week, then Depakote 125 mg every other day.  Once patient is found to Depakote 125 mg every other day patient will try Seroquel 200 mg in the morning and evening patient will report to this provider if this causes sedation.  Patient will keep follow-up in October, and follow-up if sooner if there are any other issues.  - quetiapine (SEROQUEL) 200 MG Tab; Take 1 Tab by mouth 2 times a day.  Dispense: 60 Tab; Refill: 0    2. Edema of both feet  Edema is significantly improved patient will continue elevation, compression stockings, conservative measures.    3. Essential hypertension  Continue current plan of care.    Keep follow-up in October. Patient is encouraged to be seen in the emergency room for chest pain, palpitations, shortness of breath, dizziness, severe abdominal pain or other concerning symptoms.    Please note that this dictation was created using voice recognition software. I have made every reasonable attempt to correct obvious errors, but I expect that there are errors of grammar and possibly content that I did not discover before finalizing the note.    Assessment/Plan:  1. Dementia with behavioral disturbance, unspecified dementia type  quetiapine  (SEROQUEL) 200 MG Tab   2. Edema of both feet     3. Essential hypertension            I have placed the below orders and discussed them with an approved delegating provider. The MA is performing the below orders under the direction of Dr. Townsend.

## 2017-09-08 NOTE — ASSESSMENT & PLAN NOTE
Patient states that following is much improved. Continues to refuse therapy, refuses to walk. Patient has been elevating his feet, using compression stockings daily. States that elevating his feet frequently keeps the swelling down. Patient is not having any shortness of breath, cough or other concerning symptoms.

## 2017-09-14 ENCOUNTER — OFFICE VISIT (OUTPATIENT)
Dept: PHYSICAL MEDICINE AND REHAB | Facility: MEDICAL CENTER | Age: 82
End: 2017-09-14
Payer: MEDICARE

## 2017-09-14 VITALS
SYSTOLIC BLOOD PRESSURE: 120 MMHG | WEIGHT: 110 LBS | DIASTOLIC BLOOD PRESSURE: 78 MMHG | HEART RATE: 63 BPM | HEIGHT: 69 IN | OXYGEN SATURATION: 92 % | BODY MASS INDEX: 16.29 KG/M2 | TEMPERATURE: 96.3 F

## 2017-09-14 DIAGNOSIS — Z74.09 IMPAIRED MOBILITY: ICD-10-CM

## 2017-09-14 DIAGNOSIS — Z98.890 HISTORY OF HIP SURGERY: ICD-10-CM

## 2017-09-14 DIAGNOSIS — M25.559 ARTHRALGIA OF HIP, UNSPECIFIED LATERALITY: ICD-10-CM

## 2017-09-14 DIAGNOSIS — M19.90 ARTHRITIS: ICD-10-CM

## 2017-09-14 DIAGNOSIS — Z71.89 PAIN MEDICATION AGREEMENT DISCUSSED: ICD-10-CM

## 2017-09-14 DIAGNOSIS — M25.50 ARTHRALGIA, UNSPECIFIED JOINT: ICD-10-CM

## 2017-09-14 DIAGNOSIS — M54.50 LUMBOSACRAL PAIN: ICD-10-CM

## 2017-09-14 PROCEDURE — 99213 OFFICE O/P EST LOW 20 MIN: CPT | Performed by: PHYSICAL MEDICINE & REHABILITATION

## 2017-09-14 RX ORDER — OXYCODONE HYDROCHLORIDE 5 MG/1
TABLET ORAL
COMMUNITY
Start: 2017-08-25 | End: 2017-11-27

## 2017-09-14 NOTE — PROGRESS NOTES
Subjective:      Santosh Casas presents with Follow-Up        Subjective: Mr. Casas returns to the office today for follow-up evaluation of spinal/joint/musculoskeletal pain, note history of arthritis.     The patient notes left hip/thigh area pain is improving, now controlled. The patient's history is significant for a fall in 5/2017, suffering left femoral neck fracture undergoing ORIF. The patient completed therapy. The patient has been followed by orthopedic surgeon, reviewed report from 7/2017.     The patient notes intermittent low back pain, controlled, without radicular component. The patient has history of L1 compression fracture.     The patient notes intermittent neck and mid back pain, now controlled.    The patient notes intermittent joint/musculoskeletal pain, notes history of arthritis.    The patient notes some difficulty with sleep.    The patient notes anxiety/depression    The patient has history of dementia with behavioral features, wife assists with medical care.    The patient uses a walker in the home for limited ambulation, uses a manual wheelchair for community mobility.    The patient has had prior treatment with medications. He has been to physical therapy. No acute changes with bowel/bladder noted. No acute changes with strength noted. Home exercise program has been limited. Pain is currently controlled.    The patient's wife was present for the evaluation today, assist with patient's medical care.      MEDICAL RECORDS REVIEW/DATA REVIEW: Reviewed in epic.    I reviewed medications. No medication adjustments per primary care provider, including adjustments with Seroquel and tapering of Depakote. No longer needing/using oxycodone, previously prescribed, has active prescription. The medication had been tolerated, helpful.     No aberrant behavior noted.     I reviewed  profile 9/14/2017, consistent.     I reviewed diagnostic studies:     I reviewed radiographs. Reviewed left hip  radiographs per orthopedics 7/2017, hardware in place, fracture healing. Reviewed lumbar spine x-rays 10/2016. Reviewed MRI lumbar spine 9/2015. Reviewed DEXA 9/2015, revealed osteoporosis. Reviewed MRIs of brain and cervical spine 2011.    I reviewed lab studies. Reviewed CMP 5/2017. A1C 7/2017 of 5.8. Reviewed urine drug screen 7/2017, consistent.    I reviewed medical issues. Followed by primary care provider, records reviewed.    I reviewed family history: No neuromuscular disorders noted.    I reviewed social issues. Retired, prior draftsman, also in [employer]      PAST MEDICAL HISTORY:   Past Medical History:   Diagnosis Date   • Constipation 8/28/2013   • Arthritis    • Bronchitis    • CATARACT     IOL right eye   • Coughing blood    • Dental disorder    • Diabetes    • Disease related peripheral neuropathy (CMS-HCC)    • Other specified disorder of intestines     occassional constipation   • Pain    • Psychiatric disorder     depression. insomnia   • Sleep apnea    • Thyroid disease    • Unspecified urinary incontinence    • Urinary bladder disorder        PAST SURGICAL HISTORY:    Past Surgical History:   Procedure Laterality Date   • HIP DHS IMHS GAMMA Left 5/11/2017    Procedure: HIP DHS;  Surgeon: Juan Daniel Pantoja M.D.;  Location: SURGERY [facility];  Service:    • LARYNGOSCOPY  5/18/2012    Performed by AISHA MCMILLAN at SURGERY SAME DAY [facility] ORS   • ESOPHAGOSCOPY  5/18/2012    Performed by AISHA MCMILLAN at SURGERY SAME DAY [facility] ORS   • CHOLECYSTECTOMY         ALLERGIES:  Nkda    MEDICATIONS:    Outpatient Encounter Prescriptions as of 9/14/2017   Medication Sig Dispense Refill   • oxycodone immediate-release (ROXICODONE) 5 MG Tab      • divalproex (DEPAKOTE) 125 MG EC tablet Take 1 Tab by mouth 2 Times a Day. 60 Tab 0   • lisinopril (PRINIVIL) 10 MG Tab Take 1 Tab by mouth every day. 90 Tab 3   • amlodipine (NORVASC) 5 MG Tab Take 1 Tab by mouth every day. 90 Tab 3   • famotidine  (PEPCID) 20 MG Tab Take 1 Tab by mouth 2 times a day. 60 Tab 3   • ferrous sulfate 325 (65 FE) MG tablet Take 1 Tab by mouth every day. With 250 mg vit C and a stool softner such as colace 30 Tab 3   • ascorbic acid (VITAMIN C) 500 MG tablet Take 0.5 Tabs by mouth every day. supplement for wound healing 30 Tab 3   • levothyroxine (SYNTHROID) 50 MCG Tab Take 1 Tab by mouth Every morning on an empty stomach. 90 Tab 3   • metformin (GLUCOPHAGE) 500 MG Tab Take 500 mg by mouth 2 times a day, with meals. for DM2 management     • tamsulosin (FLOMAX) 0.4 MG capsule Take 1 Cap by mouth ONE-HALF HOUR AFTER BREAKFAST. 30 Cap    • docusate sodium (COLACE) 100 MG Cap Take 1 Cap by mouth 2 times a day. 180 Cap 3   • quetiapine (SEROQUEL) 200 MG Tab Take 1 Tab by mouth 2 times a day. 60 Tab 0   • [DISCONTINUED] oxycodone immediate release (ROXICODONE) 10 MG immediate release tablet Take 10 mg by mouth every day.     • Magnesium Citrate (CITRATE OF MAGNESIA) 1.745 GM/30ML Solution Take 150 mL by mouth every day. Until you have a BM. 195 mL 0   • bisacodyl (DULCOLAX) 10 MG Suppos Insert 10 mg in rectum every day.     • bisacodyl (DULCOLAX) 10 MG Suppos Insert 1 Suppository in rectum every day. 10 Suppository 0   • polyethylene glycol/lytes (MIRALAX) Pack Take 1 Packet by mouth every 6 hours. Until bowel movement. 30 Each 2   • duloxetine (CYMBALTA) 60 MG Cap DR Particles delayed-release capsule Take 1 Cap by mouth every day. 90 Cap 3     No facility-administered encounter medications on file as of 9/14/2017.        SOCIAL HISTORY:    Social History     Social History   • Marital status:      Spouse name: N/A   • Number of children: N/A   • Years of education: N/A     Social History Main Topics   • Smoking status: Former Smoker     Packs/day: 1.00     Years: 10.00     Types: Cigarettes     Quit date: 1/1/1965   • Smokeless tobacco: Never Used   • Alcohol use No   • Drug use: No   • Sexual activity: Not on file      Comment:  "     Other Topics Concern   •  Service No   • Blood Transfusions No   • Caffeine Concern No   • Occupational Exposure No   • Hobby Hazards No   • Sleep Concern No   • Stress Concern No   • Weight Concern Yes     losing weight    • Special Diet No   • Back Care Yes   • Exercise No   • Bike Helmet No     does not ride bike    • Seat Belt Yes   • Self-Exams No     Social History Narrative   • No narrative on file     The patient denies substance use/abuse.      Review of Systems   Constitutional: Negative for fever and chills.   HENT: Positive for hearing loss. Negative for ear discharge and ear pain.    Eyes: Negative for double vision and photophobia.   Respiratory: Negative for hemoptysis and sputum production.    Cardiovascular: Negative for palpitations and orthopnea.   Gastrointestinal: Negative for nausea and vomiting.   Genitourinary: Negative for frequency and hematuria.   Musculoskeletal: Positive for myalgias, back pain and joint pain.   Skin: Negative.    Neurological: Positive for tingling and speech change.   Endo/Heme/Allergies: Negative.    Psychiatric/Behavioral: The patient has insomnia.    All other systems reviewed and are negative.        Objective:     /78   Pulse 63   Temp (!) 35.7 °C (96.3 °F)   Ht 1.753 m (5' 9\")   Wt 49.9 kg (110 lb) Comment: wheel chair   SpO2 92%   BMI 16.24 kg/m²      Physical Exam  Constitutional: Frail-appearing, no acute distress  HEENT: Normocephalic atraumatic, neck supple, no JVD noted, no masses noted, no meningeal signs noted  Lymphadenopathy: no cervical, supraclavicular, or inguinal lymphadenopathy noted  Cardiovascular: Intact distal pulses, including at wrists and ankles, no limb swelling noted  Pulmonary: No tachypnea noted, no accessory muscle use noted, no dyspnea noted  Abdominal: Soft, nontender, exhibits no distension, no peritoneal signs, no HSM  Musculoskeletal:   Right shoulder: exhibits mild tenderness. Mild pain with range " of motion testing  Left shoulder: exhibits mild tenderness. Mild pain with range of motion testing  Right hip: exhibits mild tenderness. Minimal pain with range of motion testing  Left hip: exhibits  mild tenderness left lateral hip/proximal thigh, healed scar. Mild pain with range of motion testing  Cervical back: exhibits mild decreased range of motion, mild tenderness and mild pain. Spurling's testing produces mild axial pain, trigger points noted  Lumbar back: exhibits mild decreased range of motion, only mild tenderness and mild pain. negative straight leg testing, trigger points noted  Thoracic: Kyphosis noted, only mild tenderness  Wrist/hand: Only mild pain with range of motion testing, negative tinel's at wrist, negative tinel's at elbows  Neurological: oriented. Cranial nerves grossly intact, normal strength. Sensation intact distally. Reflexes 1+ in upper and lower limbs, stance limited, mildly unstable,  No upper motor neuron signs evident  Skin: Skin is intact. no rashes or lesions noted  Psychiatric: normal mood and affect. speech is normal and behavior is normal for age. Judgment and thought content normal for age         Assessment/Plan:       ASSESSMENT:    1. Left hip/thigh pain, fall in 5/2017 with left femoral neck fracture status post left femur ORIF 5/2017, symptomatically controlled, with care of orthopedics    2. Lumbosacral pain, myofascial pain, history of lumbar one compression fracture, degenerative disc disease, listhesis, spondylosis, controlled    3. Spinal/joint/musculoskeletal pain, arthritis    4. Nerve pain, diabetic peripheral neuropathy    5. Controlled substance agreement discussed    6. Multiple comorbid medical issues, including dementia with behavioral features, with care per primary care provider, consultants      DISCUSSION/PLAN:    - I discussed management options. I reviewed symptomatic care    - I reviewed home exercise program, with medical/fall precautions, with left  hip activity/restrictions per orthopedics    - The patient can consider complementary trials with acupuncture or TENS unit    - I reviewed medication monitoring. I reviewed pain medication dosing, reviewed risks and alternatives. For now, continue current medications. I reviewed medication adjustments, pain now controlled, only using the pain medication intermittently.    - The patient has oxycodone 5 mg immediate release at home, currently has enough, prescription previously written, filled on 8/25/2017, reviewed safe storage with patient's wife    - reviewed polypharmacy, medication adjustments in progress per primary care provider    - I reviewed risks, side effects, and interactions of medications, including over-the-counter medications.  I advise the patient to avoid sudafed/pseudoephedrine-type medication as well as herbs/supplements. I reviewed bowel management program. I recommend avoid use of benzodiazepines due to risk of interaction with pain medication. I advise the patient to avoid alcohol use. I reviewed the controlled substance prescribing program. I reviewed further symptomatic medications.    - I reviewed additional diagnostic options, including further/advanced imaging, electrodiagnostic testing, vascular studies, and further lab screen    - I reviewed additional therapeutic options, including injection/interventional therapy and additional consultative input    - I reviewed psychosocial interventions    - Return on an as-needed basis      Please note that this dictation was created using voice recognition software. I have made every reasonable attempt to correct obvious errors but there may be errors of grammar and content that I may have overlooked prior to finalization of this note.

## 2017-10-14 ENCOUNTER — APPOINTMENT (OUTPATIENT)
Dept: RADIOLOGY | Facility: MEDICAL CENTER | Age: 82
End: 2017-10-14
Attending: EMERGENCY MEDICINE
Payer: MEDICARE

## 2017-10-14 ENCOUNTER — HOSPITAL ENCOUNTER (EMERGENCY)
Facility: MEDICAL CENTER | Age: 82
End: 2017-10-14
Attending: EMERGENCY MEDICINE
Payer: MEDICARE

## 2017-10-14 VITALS
DIASTOLIC BLOOD PRESSURE: 94 MMHG | HEIGHT: 69 IN | BODY MASS INDEX: 16.29 KG/M2 | TEMPERATURE: 96.8 F | OXYGEN SATURATION: 98 % | RESPIRATION RATE: 16 BRPM | HEART RATE: 90 BPM | WEIGHT: 110 LBS | SYSTOLIC BLOOD PRESSURE: 131 MMHG

## 2017-10-14 DIAGNOSIS — S80.12XA CONTUSION OF LEFT LOWER EXTREMITY, INITIAL ENCOUNTER: ICD-10-CM

## 2017-10-14 PROCEDURE — 72170 X-RAY EXAM OF PELVIS: CPT

## 2017-10-14 PROCEDURE — 99284 EMERGENCY DEPT VISIT MOD MDM: CPT

## 2017-10-14 PROCEDURE — 73552 X-RAY EXAM OF FEMUR 2/>: CPT | Mod: LT

## 2017-10-14 ASSESSMENT — PAIN SCALES - WONG BAKER: WONGBAKER_NUMERICALRESPONSE: HURTS EVEN MORE

## 2017-10-14 NOTE — ED NOTES
Pt bib ems from home.  Pt fell out of bed.  Hx of left femur fx and complaining of left upper leg pain.  Pt with dementia, oriented to self and at baseline.

## 2017-10-14 NOTE — ED NOTES
Diaper changed and pt dressed with assistance.  Pt transferred to wheelchair with assistance.  Wheeled out to front with wife and grand daughter to drive home.  Per wife pt has ortho to follow up with.

## 2017-10-15 ENCOUNTER — PATIENT OUTREACH (OUTPATIENT)
Dept: HEALTH INFORMATION MANAGEMENT | Facility: OTHER | Age: 82
End: 2017-10-15

## 2017-10-16 ENCOUNTER — APPOINTMENT (OUTPATIENT)
Dept: MEDICAL GROUP | Facility: PHYSICIAN GROUP | Age: 82
End: 2017-10-16
Payer: MEDICARE

## 2017-11-27 ENCOUNTER — OFFICE VISIT (OUTPATIENT)
Dept: MEDICAL GROUP | Facility: PHYSICIAN GROUP | Age: 82
End: 2017-11-27
Payer: MEDICARE

## 2017-11-27 VITALS
HEIGHT: 68 IN | DIASTOLIC BLOOD PRESSURE: 64 MMHG | OXYGEN SATURATION: 96 % | BODY MASS INDEX: 16.67 KG/M2 | HEART RATE: 83 BPM | SYSTOLIC BLOOD PRESSURE: 118 MMHG | WEIGHT: 110 LBS | RESPIRATION RATE: 16 BRPM | TEMPERATURE: 97.6 F

## 2017-11-27 DIAGNOSIS — E11.9 CONTROLLED TYPE 2 DIABETES MELLITUS WITHOUT COMPLICATION, WITHOUT LONG-TERM CURRENT USE OF INSULIN (HCC): ICD-10-CM

## 2017-11-27 DIAGNOSIS — M1A.0720 CHRONIC IDIOPATHIC GOUT INVOLVING TOE OF LEFT FOOT WITHOUT TOPHUS: ICD-10-CM

## 2017-11-27 DIAGNOSIS — Z00.00 MEDICARE ANNUAL WELLNESS VISIT, INITIAL: Primary | ICD-10-CM

## 2017-11-27 DIAGNOSIS — E03.9 ACQUIRED HYPOTHYROIDISM: ICD-10-CM

## 2017-11-27 DIAGNOSIS — L30.9 ECZEMA, UNSPECIFIED TYPE: ICD-10-CM

## 2017-11-27 DIAGNOSIS — F33.1 MODERATE EPISODE OF RECURRENT MAJOR DEPRESSIVE DISORDER (HCC): ICD-10-CM

## 2017-11-27 DIAGNOSIS — F03.91 DEMENTIA WITH BEHAVIORAL DISTURBANCE, UNSPECIFIED DEMENTIA TYPE: ICD-10-CM

## 2017-11-27 DIAGNOSIS — K21.9 GASTROESOPHAGEAL REFLUX DISEASE, ESOPHAGITIS PRESENCE NOT SPECIFIED: ICD-10-CM

## 2017-11-27 DIAGNOSIS — C02.9 SQUAMOUS CELL CANCER OF TONGUE (HCC): ICD-10-CM

## 2017-11-27 DIAGNOSIS — I10 ESSENTIAL HYPERTENSION: ICD-10-CM

## 2017-11-27 DIAGNOSIS — D50.0 ANEMIA, BLOOD LOSS: ICD-10-CM

## 2017-11-27 DIAGNOSIS — G47.00 INSOMNIA, UNSPECIFIED TYPE: ICD-10-CM

## 2017-11-27 DIAGNOSIS — T14.8XXA BRUISING: ICD-10-CM

## 2017-11-27 DIAGNOSIS — F41.9 ANXIETY: ICD-10-CM

## 2017-11-27 DIAGNOSIS — M54.50 CHRONIC MIDLINE LOW BACK PAIN WITHOUT SCIATICA: ICD-10-CM

## 2017-11-27 DIAGNOSIS — G89.29 CHRONIC MIDLINE LOW BACK PAIN WITHOUT SCIATICA: ICD-10-CM

## 2017-11-27 DIAGNOSIS — E44.1 MILD PROTEIN-CALORIE MALNUTRITION (HCC): ICD-10-CM

## 2017-11-27 DIAGNOSIS — R26.9 GAIT DISORDER: ICD-10-CM

## 2017-11-27 DIAGNOSIS — Z91.81 RISK FOR FALLS: ICD-10-CM

## 2017-11-27 PROBLEM — R60.0 EDEMA OF BOTH FEET: Status: RESOLVED | Noted: 2017-08-25 | Resolved: 2017-11-27
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
## 2017-11-27 PROBLEM — E87.1 HYPONATREMIA: Status: RESOLVED | Noted: 2017-05-12 | Resolved: 2017-11-27

## 2017-11-27 PROBLEM — F32.9 MAJOR DEPRESSIVE DISORDER: Status: ACTIVE | Noted: 2017-11-27

## 2017-11-27 LAB
HBA1C MFR BLD: 5.5 % (ref ?–5.8)
INT CON NEG: NEGATIVE
INT CON POS: POSITIVE

## 2017-11-27 PROCEDURE — 83036 HEMOGLOBIN GLYCOSYLATED A1C: CPT | Performed by: NURSE PRACTITIONER

## 2017-11-27 PROCEDURE — G0439 PPPS, SUBSEQ VISIT: HCPCS | Mod: 25 | Performed by: NURSE PRACTITIONER

## 2017-11-27 PROCEDURE — 99214 OFFICE O/P EST MOD 30 MIN: CPT | Mod: 25 | Performed by: NURSE PRACTITIONER

## 2017-11-27 RX ORDER — TRIAMCINOLONE ACETONIDE 1 MG/G
1 CREAM TOPICAL 2 TIMES DAILY
Qty: 1 TUBE | Refills: 0 | Status: SHIPPED | OUTPATIENT
Start: 2017-11-27 | End: 2021-01-01

## 2017-11-27 RX ORDER — QUETIAPINE FUMARATE 200 MG/1
200 TABLET, FILM COATED ORAL DAILY
Qty: 90 TAB | Refills: 3 | Status: SHIPPED | OUTPATIENT
Start: 2017-11-27 | End: 2018-11-30 | Stop reason: SDUPTHER

## 2017-11-27 ASSESSMENT — PATIENT HEALTH QUESTIONNAIRE - PHQ9
CLINICAL INTERPRETATION OF PHQ2 SCORE: 6
SUM OF ALL RESPONSES TO PHQ QUESTIONS 1-9: 13
5. POOR APPETITE OR OVEREATING: 0 - NOT AT ALL

## 2017-11-27 NOTE — PROGRESS NOTES
Chief Complaint   Patient presents with   • Sore     Back L shoulder x 1 month    • Medication Refill     seroquel          HPI:  Santosh Casas is a 83 y.o. here for Medicare Annual Wellness Visit     Anxiety  Chronic in nature. Stable. Patient continues to take Seroquel and Cymbalta. Symptoms are controlled at this time. Patient states that due to the dizziness is much improved, states that he is having less issues with falls. Patient denies chronic worrier, irritation, agitation at this time. Patient does have dementia.    Chronic midline low back pain without sciatica  Connick in nature. Stable. Patient is currently taking over-the-counter Tylenol as needed for pain. Continues to follow-up with Dr. Sanders. Has been weaned off oxycodone.    Major depressive disorder  Chronic in nature. Stable. Patient jokes during the exam, states he does have some symptoms of sadness but states it is generally well controlled with Cymbalta. Patient denies suicidal or homicidal ideation.    GERD (gastroesophageal reflux disease)  Taking medication daily. No early satiety, unintentional weight loss, choking, persistent burning pain in chest or upper abdomen.      Hypertension  Chronic in nature. Stable. Blood pressure today is 118/64. Patient continues to take medications as prescribed without side effects. Patient denies chest pain, palpitations, dizziness, headache, shortness of breath.    Eczema  This is a new problem. Patient has several small round sores on his right upper back these appear dry, flaky and look like they have been scratched either not consistent with bedsores. Patient and family states that these do seem to be getting better.     Anemia, blood loss  Chronic in nature. Patient has not completed follow-up CBC since July at which point H&H was noted to be low. Patient has discontinued taking iron at this time. Patient is encouraged to follow up with CBC.    Controlled type 2 diabetes mellitus without  "complication, without long-term current use of insulin (CMS-HCC)  Chronic in nature. Stable. A1c is 5.5 at this time. May need to consider decreasing metformin. Patient denies issues with low blood sugars. States that his diet is generally healthy.      Dementia with behavioral disturbance  Chronic in nature. Stable. Patient is currently taking Seroquel once daily with good results. Wife says that he has not been having any more outbursts and has not been combative recently. Patient states that he is generally feeling well and is more alert and oriented and talkative.    Risk for falls  He continues to have difficulty with ambulation and is at high risk for fall. Patient refuses to continue physical therapy at this time.    Chronic idiopathic gout involving toe of left foot without tophus  Chronic in nature. Stable. Patient states that he has not had a flareup in \"a long time\".    Gait disorder  Connick in nature. Patient continues to use a wheelchair. States that he did have one recent fall. Patient refuses continued home health for physical therapy.    Hypothyroidism  Labwork reviewed up to date. Taking medicine as directed. Denies palpitations, skin changes, temperature intolerance, changes in bowel habits.      Insomnia  Chronic in nature. Stable. Patient continues to take Seroquel once nightly.    Protein calorie malnutrition (CMS-HCC)  Chronic in nature. Stable. Patient's weight is 110 today. Patient continues to have difficulty with eating and states that he is not frequently hungry.    Squamous cell cancer of tongue (CMS-HCC)  Patient states that at one point he was following up with oncology but states he canceled his follow-up appointment and does not wish to return. No swelling in his throat and difficulty swallowing or other concerning symptoms. Jasmine has completed treatment.        Patient Active Problem List    Diagnosis Date Noted   • Hypertension 11/22/2011     Priority: Low   • Hypothyroidism " 09/01/2009     Priority: Low   • Risk for falls 11/27/2017   • Major depressive disorder 11/27/2017   • Eczema 11/27/2017   • Dementia with behavioral disturbance 08/25/2017   • Anemia, blood loss 05/12/2017   • Iron deficiency 05/12/2017   • Femur fracture, left (CMS-HCC) 05/11/2017   • Chronic midline low back pain without sciatica 02/24/2017   • Drug-induced constipation 02/24/2017   • Protein calorie malnutrition (CMS-HCC) 12/01/2016   • Controlled type 2 diabetes mellitus without complication, without long-term current use of insulin (CMS-HCC) 12/01/2016   • Gait disorder 05/11/2016   • Insomnia 04/06/2016   • Squamous cell cancer of tongue (CMS-HCC) 11/08/2012   • GERD (gastroesophageal reflux disease) 11/08/2010   • Anxiety 01/25/2010   • Chronic idiopathic gout involving toe of left foot without tophus 06/15/2009       Current Outpatient Prescriptions   Medication Sig Dispense Refill   • quetiapine (SEROQUEL) 200 MG Tab Take 1 Tab by mouth every day. 90 Tab 3   • triamcinolone acetonide (KENALOG) 0.1 % Cream Apply 1 Application to affected area(s) 2 times a day. 1 Tube 0   • bisacodyl (DULCOLAX) 10 MG Suppos Insert 10 mg in rectum every day.     • duloxetine (CYMBALTA) 60 MG Cap DR Particles delayed-release capsule Take 1 Cap by mouth every day. 90 Cap 3   • lisinopril (PRINIVIL) 10 MG Tab Take 1 Tab by mouth every day. 90 Tab 3   • amlodipine (NORVASC) 5 MG Tab Take 1 Tab by mouth every day. 90 Tab 3   • famotidine (PEPCID) 20 MG Tab Take 1 Tab by mouth 2 times a day. 60 Tab 3   • ascorbic acid (VITAMIN C) 500 MG tablet Take 0.5 Tabs by mouth every day. supplement for wound healing 30 Tab 3   • Magnesium Citrate (CITRATE OF MAGNESIA) 1.745 GM/30ML Solution Take 150 mL by mouth every day. Until you have a BM. 195 mL 0   • bisacodyl (DULCOLAX) 10 MG Suppos Insert 1 Suppository in rectum every day. 10 Suppository 0   • polyethylene glycol/lytes (MIRALAX) Pack Take 1 Packet by mouth every 6 hours. Until bowel  movement. 30 Each 2   • levothyroxine (SYNTHROID) 50 MCG Tab Take 1 Tab by mouth Every morning on an empty stomach. 90 Tab 3   • metformin (GLUCOPHAGE) 500 MG Tab Take 500 mg by mouth 2 times a day, with meals. for DM2 management     • docusate sodium (COLACE) 100 MG Cap Take 1 Cap by mouth 2 times a day. 180 Cap 3     No current facility-administered medications for this visit.             Current supplements as per medication list.       Allergies: Nkda [no known drug allergy]    Current social contact/activities: none, spends time with his wife and granddaughter     He  reports that he quit smoking about 52 years ago. His smoking use included Cigarettes. He has a 10.00 pack-year smoking history. He has never used smokeless tobacco. He reports that he does not drink alcohol or use drugs.  Counseling given: Not Answered        DPA/Advanced Directive:  Patient does not have an Advanced Directive.  A packet and workshop information was given on Advanced Directives.      ROS:    Gait: Uses a wheelchair   Ostomy: no   Other tubes: no   Amputations: no   Chronic oxygen use: no   Last eye exam:unknown   : Denies any urinary leakage during the last 6 months incontinence.       Screening:    Depression Screening    Little interest or pleasure in doing things?  3 - nearly every day  Feeling down, depressed , or hopeless? 3 - nearly every day  Trouble falling or staying asleep, or sleeping too much?  1 - several days  Feeling tired or having little energy?  1 - several days  Poor appetite or overeating?  0 - not at all  Feeling bad about yourself - or that you are a failure or have let yourself or your family down? 2 - more than half the days  Trouble concentrating on things, such as reading the newspaper or watching television? 2 - more than half the days  Moving or speaking so slowly that other people could have noticed.  Or the opposite - being so fidgety or restless that you have been moving around a lot more than  usual?  1 - several days  Thoughts that you would be better off dead, or of hurting yourself?  0 - not at all  Patient Health Questionnaire Score: 13    If depressive symptoms identified deferred to follow up visit unless specifically addressed in assessment and plan.    Interpretation of PHQ-9 Total Score   Score Severity   1-4 No Depression   5-9 Mild Depression   10-14 Moderate Depression   15-19 Moderately Severe Depression   20-27 Severe Depression      Screening for Cognitive Impairment    Three Minute Recall (apple, watch, wallace)  0/3    Draw clock face with all 12 numbers set to the hand to show 10 minutes past 11 o'clock       Cognitive concerns identified deferred for follow up unless specifically addressed in assessment and plan.    Fall Risk Assessment    Has the patient had two or more falls in the last year or any fall with injury in the last year?  Yes    Safety Assessment    Throw rugs on floor.  Yes  Handrails on all stairs.  No  Good lighting in all hallways.  Yes  Difficulty hearing.  Yes  Patient counseled about all safety risks that were identified.    Functional Assessment ADLs    Are there any barriers preventing you from cooking for yourself or meeting nutritional needs?  Yes.    Are there any barriers preventing you from driving safely or obtaining transportation?  Yes.    Are there any barriers preventing you from using a telephone or calling for help?  Yes.    Are there any barriers preventing you from shopping?  Yes.    Are there any barriers preventing you from taking care of your own finances?  Yes.    Are there any barriers preventing you from managing your medications?  Yes.    Are currently engaging any exercise or physical activity?  No.       Health Maintenance Summary                Annual Wellness Visit Overdue 4/27/1934     RETINAL SCREENING Overdue 3/20/2015      Done 3/20/2014 AMB REFERRAL FOR RETINAL SCREENING EXAM     Patient has more history with this topic...    FASTING  LIPID PROFILE Overdue 6/8/2016      Done 6/8/2015 LIPID PROFILE     Patient has more history with this topic...    URINE ACR / MICROALBUMIN Overdue 6/8/2016      Done 6/8/2015 MICROALBUMIN CREAT RATIO URINE     Patient has more history with this topic...    IMM INFLUENZA Postponed 2/1/2018 Originally 9/1/2017. Patient Refused     Done 11/23/2011 Imm Admin: Influenza TIV (IM)    IMM PNEUMOCOCCAL 65+ (ADULT) LOW/MEDIUM RISK SERIES Postponed 7/1/2018 Originally 11/23/2012. Patient Refused     Done 11/23/2011 Imm Admin: Pneumococcal polysaccharide vaccine (PPSV-23)    IMM DTaP/Tdap/Td Vaccine Postponed 7/1/2018 Originally [birth year]27/1953. Patient Refused    IMM ZOSTER VACCINE Postponed 7/1/2018 Originally 4/27/1994. Patient Refused    A1C SCREENING Next Due 1/17/2018      Done 7/17/2017 HEMOGLOBIN A1C (A)     Patient has more history with this topic...    SERUM CREATININE Next Due 5/16/2018      Done 5/16/2017 BASIC METABOLIC PANEL (A)     Patient has more history with this topic...    COLONOSCOPY Next Due 6/16/2024      Not specified 6/16/2014           Patient Care Team:  CHUCK OteroRCHEVY as PCP - General (Family Medicine)  Julio Jimenes M.D. as Consulting Physician (Otolaryngology)  Geneva Berrios P.A.-C. as Consulting Physician (Orthopaedics)  German Bush D.P.M. as Consulting Physician (Podiatry)  [facility] as Consulting Physician (Ophthalmology)  [facility] as Home Health Provider      Social History   Substance Use Topics   • Smoking status: Former Smoker     Packs/day: 1.00     Years: 10.00     Types: Cigarettes     Quit date: 1/1/1965   • Smokeless tobacco: Never Used   • Alcohol use No     Family History   Problem Relation Age of Onset   • Heart Disease Father    • Diabetes Neg Hx    • Stroke Neg Hx    • Cancer Neg Hx      stomach     He  has a past medical history of Arthritis; Bronchitis; CATARACT; Constipation (8/28/2013); Coughing blood; Dental disorder;  Diabetes; Disease related peripheral neuropathy (CMS-HCC); Other specified disorder of intestines; Pain; Psychiatric disorder; Sleep apnea; Thyroid disease; Unspecified urinary incontinence; and Urinary bladder disorder.   Past Surgical History:   Procedure Laterality Date   • HIP DHS IMHS GAMMA Left 5/11/2017    Procedure: HIP DHS;  Surgeon: Juan Daniel Pantoja M.D.;  Location: SURGERY [facility];  Service:    • LARYNGOSCOPY  5/18/2012    Performed by AISHA MCMILLAN at SURGERY SAME DAY [facility] ORS   • ESOPHAGOSCOPY  5/18/2012    Performed by AISHA MCMILLAN at SURGERY SAME DAY [facility] ORS   • CHOLECYSTECTOMY       Review of Systems:   Constitutional:  Negative for fever, chills, weight loss and malaise/fatigue.   HEENT:  Negative for ear pain, nosebleeds, congestion, sore throat and neck pain.    Eyes:  Negative for blurred vision.   Respiratory:  Negative for cough, sputum production, shortness of breath and wheezing.    Cardiovascular:  Negative for chest pain, palpitations, orthopnea and leg swelling.   Gastrointestinal:  Negative for heartburn, nausea, vomiting and abdominal pain.   Genitourinary:  Negative for dysuria, urgency and frequency.   Musculoskeletal:  Negative for myalgias, back pain and joint pain.   Skin:  Negative for rash and itching.   Neurological:  Negative for dizziness, tingling, tremors, sensory change, focal weakness and headaches.   Endo/Heme/Allergies:  Does not bruise/bleed easily.   Psychiatric/Behavioral:  Negative for depression, suicidal ideas and memory loss.  The patient is not nervous/anxious and does not have insomnia.    All other systems reviewed and are negative except as in HPI.    Exam:  @V@  General:  Normal appearing. No distress.  HEENT:  Normocephalic. Eyes conjunctiva clear lids without ptosis, pupils equal and reactive to light accommodation, ears normal shape and contour, canals are clear bilaterally, tympanic membranes are benign, nasal mucosa benign,  "oropharynx is without erythema, edema or exudates.   Neck:  Supple without JVD or bruit. Thyroid is not enlarged.  Pulmonary:  Clear to ausculation.  Normal effort. No rales, ronchi, or wheezing.  Cardiovascular:  Regular rate and rhythm without murmur. Carotid and radial pulses are intact and equal bilaterally.  Abdomen:  Soft, nontender, nondistended. Normal bowel sounds. Liver and spleen are not palpable  Neurologic:  Grossly nonfocal  Lymph:  No cervical, supraclavicular or axillary lymph nodes are palpable  Skin:  Warm and dry.3. Small round scabs noted on patient's right shoulder. A small round scaly patch below this area. There is no heat, a signs of infection and there is mild erythema around the largest lesion indicative scratching. Several small asymmetrical reddish purple splotches on his right hand, these are new, not related to trauma, patient does not know of any others.  Musculoskeletal:  Normal gait. No extremity cyanosis, clubbing, or edema.  Psych:  Normal mood and affect. Alert and oriented x3. Judgment and insight is normal.    Monofilament testing with a 10 gram force: sensation intact: decreased bilaterally  Visual Inspection: Feet without maceration, ulcers, fissures.  Pedal pulses: decreased bilaterally      Exam:     Blood pressure 118/64, pulse 83, temperature 36.4 °C (97.6 °F), resp. rate 16, height 1.727 m (5' 8\"), weight 49.9 kg (110 lb), SpO2 96 %. Body mass index is 16.73 kg/m².    Hearing good.    Dentition fair  Alert, oriented in no acute distress.  Eye contact is good, speech goal directed, affect calm      Assessment and Plan. The following treatment and monitoring plan is recommended:    1. Medicare annual wellness visit, initial     2. Risk for falls  Patient identified as fall risk.  Appropriate orders and counseling given.   3. Moderate episode of recurrent major depressive disorder (CMS-HCC)  Patient has been identified as being depressed and appropriate orders and counseling " have been given   4. Dementia with behavioral disturbance, unspecified dementia type  quetiapine (SEROQUEL) 200 MG Tab   5. Controlled type 2 diabetes mellitus without complication, without long-term current use of insulin (CMS-HCC)  POCT A1C    Diabetic Monofilament Lower Extremity Exam   6. Eczema, unspecified type     7. Bruising  CBC WITH DIFFERENTIAL    COMP METABOLIC PANEL   8. Anxiety     9. Chronic midline low back pain without sciatica     10. Gastroesophageal reflux disease, esophagitis presence not specified     11. Essential hypertension     12. Anemia, blood loss     13. Chronic idiopathic gout involving toe of left foot without tophus     14. Gait disorder     15. Acquired hypothyroidism     16. Insomnia, unspecified type     17. Mild protein-calorie malnutrition (CMS-HCC)     18. Squamous cell cancer of tongue (CMS-HCC)       1. Risk for falls  Counseled patient regarding safety, recommend physical therapy which patient refuses.  - Patient identified as fall risk.  Appropriate orders and counseling given.    2. Moderate episode of recurrent major depressive disorder (CMS-HCC)  Continue current plan of care  - Patient has been identified as being depressed and appropriate orders and counseling have been given    3. Dementia with behavioral disturbance, unspecified dementia type  Continue current plan of care  - quetiapine (SEROQUEL) 200 MG Tab; Take 1 Tab by mouth every day.  Dispense: 90 Tab; Refill: 3    4. Controlled type 2 diabetes mellitus without complication, without long-term current use of insulin (CMS-HCC)  A1c is excellent  - POCT A1C  - Diabetic Monofilament Lower Extremity Exam    5. Medicare annual wellness visit, initial    6. Eczema, unspecified type  Medication is provided for patient to apply twice daily counseled patient regarding the importance of moisturizer, hygiene.    7. Bruising  Will check basic labs.  - CBC WITH DIFFERENTIAL; Future  - COMP METABOLIC PANEL; Future    8.  Anxiety  Continue current plan care.    9. Chronic midline low back pain without sciatica  Continue over-the-counter medication, follow-up with Dr. Sanders    10. Gastroesophageal reflux disease, esophagitis presence not specified  Continue current plan of care.    11. Essential hypertension  Continue current plan of care.    12. Anemia, blood loss  Patient is encouraged to complete follow-up CBC.    13. Chronic idiopathic gout involving toe of left foot without tophus  Continue current plan of care.    14. Gait disorder  Recommended strengthening    15. Acquired hypothyroidism  Continue current medication.    16. Insomnia, unspecified type  Continue current medication.    17. Mild protein-calorie malnutrition (CMS-[facility])  Counseled patient regarding the importance of eating.    18. Squamous cell cancer of tongue (CMS-HCC)  Continue monitoring.    Follow-up in 3 months or sooner as needed. Patient is encouraged to be seen in the emergency room for chest pain, palpitations, shortness of breath, dizziness, severe abdominal pain or other concerning symptoms.    Services suggested: No services needed at this time, patient refuses referral.  Health Care Screening: Age-appropriate preventive services Medicare covers discussed today and ordered if indicated.  Referrals offered: Community-based lifestyle interventions to reduce health risks and promote self-management and wellness, fall prevention, nutrition, physical activity, tobacco-use cessation, weight loss, and mental health services as per orders if indicated.    Discussion today about general wellness and lifestyle habits:    · Prevent falls and reduce trip hazards; Cautioned about securing or removing rugs.  · Have a working fire alarm and carbon monoxide detector;   · Engage in regular physical activity and social activities       Follow-up: No Follow-up on file.

## 2017-11-27 NOTE — ASSESSMENT & PLAN NOTE
Chronic in nature. Stable. Blood pressure today is 118/64. Patient continues to take medications as prescribed without side effects. Patient denies chest pain, palpitations, dizziness, headache, shortness of breath.

## 2017-11-27 NOTE — ASSESSMENT & PLAN NOTE
Patient states that at one point he was following up with oncology but states he canceled his follow-up appointment and does not wish to return. No swelling in his throat and difficulty swallowing or other concerning symptoms. Jasmine has completed treatment.

## 2017-11-27 NOTE — ASSESSMENT & PLAN NOTE
Chronic in nature. Stable. Patient jokes during the exam, states he does have some symptoms of sadness but states it is generally well controlled with Cymbalta. Patient denies suicidal or homicidal ideation.

## 2017-11-27 NOTE — ASSESSMENT & PLAN NOTE
Kedar in nature. Stable. Patient is currently taking over-the-counter Tylenol as needed for pain. Continues to follow-up with Dr. Sanders. Has been weaned off oxycodone.

## 2017-11-27 NOTE — ASSESSMENT & PLAN NOTE
Labwork reviewed up to date. Taking medicine as directed. Denies palpitations, skin changes, temperature intolerance, changes in bowel habits.

## 2017-11-27 NOTE — ASSESSMENT & PLAN NOTE
Chronic in nature. Stable. Patient continues to take Seroquel and Cymbalta. Symptoms are controlled at this time. Patient states that due to the dizziness is much improved, states that he is having less issues with falls. Patient denies chronic worrier, irritation, agitation at this time. Patient does have dementia.

## 2017-11-27 NOTE — ASSESSMENT & PLAN NOTE
Chronic in nature. Stable. Patient's weight is 110 today. Patient continues to have difficulty with eating and states that he is not frequently hungry.

## 2017-11-27 NOTE — ASSESSMENT & PLAN NOTE
This is a new problem. Patient has several small round sores on his right upper back these appear dry, flaky and look like they have been scratched either not consistent with bedsores. Patient and family states that these do seem to be getting better.

## 2017-11-27 NOTE — ASSESSMENT & PLAN NOTE
Chronic in nature. Stable. A1c is 5.5 at this time. May need to consider decreasing metformin. Patient denies issues with low blood sugars. States that his diet is generally healthy.

## 2017-11-27 NOTE — ASSESSMENT & PLAN NOTE
Chronic in nature. Stable. Patient is currently taking Seroquel once daily with good results. Wife says that he has not been having any more outbursts and has not been combative recently. Patient states that he is generally feeling well and is more alert and oriented and talkative.

## 2017-11-27 NOTE — ASSESSMENT & PLAN NOTE
Kedar in nature. Patient continues to use a wheelchair. States that he did have one recent fall. Patient refuses continued home health for physical therapy.

## 2017-11-27 NOTE — ASSESSMENT & PLAN NOTE
He continues to have difficulty with ambulation and is at high risk for fall. Patient refuses to continue physical therapy at this time.

## 2017-12-07 NOTE — PROGRESS NOTES
No retinal holes or tears seen on exam. Recommended observation. We reviewed the signs and symptoms of retinal tear/retinal detachment and the importance of prompt evaluation should there be increasing floaters, new flashing lights, or decreasing peripheral vision in either eye at any time. Patient understands condition, prognosis and need for follow up care. Subjective:      Santosh Casas is a 82 y.o. male who presents with Fall            Fall  The accident occurred 3 to 5 days ago (L hip inj). The fall occurred while walking. He fell from a height of 1 to 2 ft. He landed on hard floor. There was no blood loss. The point of impact was the left hip. The pain is present in the left hip. The pain is moderate. The symptoms are aggravated by ambulation and pressure on injury. Pertinent negatives include no numbness or tingling. He has tried rest for the symptoms. The treatment provided mild relief.       Review of Systems   Constitutional: Negative.    Musculoskeletal: Positive for joint pain and falls.   Skin: Negative.    Neurological: Positive for focal weakness. Negative for tingling, sensory change and numbness.          Objective:     /80 mmHg  Pulse 96  Temp(Src) 36.5 °C (97.7 °F)  Resp 16  SpO2 94%     Physical Exam   Constitutional: He is oriented to person, place, and time. He appears well-developed and well-nourished. No distress.   Musculoskeletal: Normal range of motion. He exhibits tenderness (+ttp L hip area). He exhibits no edema.   Neurological: He is alert and oriented to person, place, and time. No sensory deficit. He exhibits abnormal muscle tone. Coordination normal.   Skin: Skin is warm and dry.   Psychiatric: He has a normal mood and affect. His behavior is normal. Judgment and thought content normal.   Nursing note and vitals reviewed.    Filed Vitals:    01/21/17 1600   BP: 130/80   Pulse: 96   Temp: 36.5 °C (97.7 °F)   Resp: 16   SpO2: 94%     Active Ambulatory Problems     Diagnosis Date Noted   • GOUT 06/15/2009   • Depression 06/15/2009   • Hypothyroidism 09/01/2009   • Anxiety 01/25/2010   • GERD (gastroesophageal reflux disease) 11/08/2010   • Hypertension 11/22/2011   • Squamous cell cancer of tongue (CMS-HCC) 11/08/2012   • Insomnia 04/06/2016   • Gait disorder 05/11/2016   • Protein calorie malnutrition (CMS-HCC) 12/01/2016    • Controlled type 2 diabetes mellitus without complication, without long-term current use of insulin (CMS-HCC) 12/01/2016     Resolved Ambulatory Problems     Diagnosis Date Noted   • Sleep disorder 06/15/2009   • Diabetes 06/15/2009   • Hemoptysis 11/22/2011   • DIABETES MELLITUS 04/20/2012   • Type 2 diabetes mellitus (CMS-HCC) 10/29/2012   • Chronic constipation 07/25/2013   • Chronic pain 07/25/2013   • Lactic acidosis 08/21/2013   • Constipation 08/28/2013   • Chronic pain 02/20/2015   • DM II (diabetes mellitus, type II), controlled ([facility]) 05/20/2015   • Opioid type dependence, continuous (CMS-HCC) 07/05/2016     Past Medical History   Diagnosis Date   • Psychiatric disorder    • Disease related peripheral neuropathy (CMS-HCC)    • Thyroid disease    • Arthritis    • Unspecified urinary incontinence    • Dental disorder    • Urinary bladder disorder    • Bronchitis    • Coughing blood    • Sleep apnea    • Pain    • Other specified disorder of intestines    • CATARACT      Current Outpatient Prescriptions on File Prior to Visit   Medication Sig Dispense Refill   • oxycodone immediate release (ROXICODONE) 10 MG immediate release tablet Take 1 Tab by mouth every 6 hours as needed for Severe Pain. 90 Tab 0   • quetiapine (SEROQUEL) 300 MG tablet Take 1 Tab by mouth every evening. 90 Tab 0   • metformin (GLUCOPHAGE) 500 MG Tab TAKE ONE TABLET BY MOUTH TWICE A DAY WITH MEALS 180 Tab 2   • ferrous sulfate 325 (65 FE) MG tablet Take 1 Tab by mouth every day. With 250 mg vit C and a stool softner such as colace 30 Tab 3   • levothyroxine (SYNTHROID) 50 MCG Tab Take 1 Tab by mouth Every morning on an empty stomach. 90 Tab 3   • duloxetine (CYMBALTA) 60 MG Cap DR Particles delayed-release capsule Take 1 Cap by mouth every day. 30 Cap 3   • sertraline (ZOLOFT) 100 MG Tab Take 2 Tabs by mouth every day. For depression, at night (Patient not taking: Reported on 11/3/2016) 60 Tab 5   • alprazolam (XANAX) 1 MG TABS Take 1  Tab by mouth 3 times a day as needed for Anxiety. (Patient not taking: Reported on 11/3/2016) 90 Tab 0   • polyethylene glycol/lytes (MIRALAX) PACK Take 17 g by mouth 1 time daily as needed. Indications: Constipation       No current facility-administered medications on file prior to visit.     Gargles, Cepacol lozenges, Aleve/Advil as needed for throat pain  Family History   Problem Relation Age of Onset   • Heart Disease Father    • Diabetes Neg Hx    • Stroke Neg Hx    • Cancer Neg Hx      stomach     Nkda       xr ng (read/interpret. By me. Rw)         Assessment/Plan:     1. Fall, initial encounter     - DX-HIP-UNILATERAL-WITH PELVIS-1 VIEW LEFT; Future    2. Injury of left hip, initial encounter     -

## 2018-01-16 DIAGNOSIS — K21.9 GASTROESOPHAGEAL REFLUX DISEASE, ESOPHAGITIS PRESENCE NOT SPECIFIED: ICD-10-CM

## 2018-01-16 RX ORDER — FAMOTIDINE 20 MG/1
20 TABLET, FILM COATED ORAL 2 TIMES DAILY
Qty: 180 TAB | Refills: 3 | Status: SHIPPED | OUTPATIENT
Start: 2018-01-16 | End: 2019-09-30

## 2018-02-05 ENCOUNTER — OFFICE VISIT (OUTPATIENT)
Dept: MEDICAL GROUP | Facility: MEDICAL CENTER | Age: 83
End: 2018-02-05
Payer: MEDICARE

## 2018-02-05 VITALS
OXYGEN SATURATION: 92 % | WEIGHT: 110 LBS | DIASTOLIC BLOOD PRESSURE: 68 MMHG | TEMPERATURE: 98.5 F | SYSTOLIC BLOOD PRESSURE: 118 MMHG | HEIGHT: 68 IN | HEART RATE: 93 BPM | BODY MASS INDEX: 16.67 KG/M2

## 2018-02-05 DIAGNOSIS — F03.91 DEMENTIA WITH BEHAVIORAL DISTURBANCE, UNSPECIFIED DEMENTIA TYPE: ICD-10-CM

## 2018-02-05 DIAGNOSIS — J06.9 ACUTE URI: ICD-10-CM

## 2018-02-05 DIAGNOSIS — E44.1 MILD PROTEIN-CALORIE MALNUTRITION (HCC): ICD-10-CM

## 2018-02-05 DIAGNOSIS — F33.1 MODERATE EPISODE OF RECURRENT MAJOR DEPRESSIVE DISORDER (HCC): ICD-10-CM

## 2018-02-05 PROCEDURE — 99214 OFFICE O/P EST MOD 30 MIN: CPT | Performed by: PHYSICIAN ASSISTANT

## 2018-02-05 ASSESSMENT — PAIN SCALES - GENERAL: PAINLEVEL: NO PAIN

## 2018-02-05 NOTE — ASSESSMENT & PLAN NOTE
This is an 83-year-old male accompanied by his caregivers. For about a week he's been sick. For the past 3 days has been coughing. No fevers. No chest pain or shortness of breath. He has difficulty hearing. No medication provided.

## 2018-02-05 NOTE — PROGRESS NOTES
Subjective:   Santosh Casas is a 83 y.o. male here today for cough for 3 days.    Acute URI  This is an 83-year-old male accompanied by his caregivers. For about a week he's been sick. For the past 3 days has been coughing. No fevers. No chest pain or shortness of breath. He has difficulty hearing. No medication provided.    Dementia with behavioral disturbance  Dementia currently appears stable per caregivers.    Protein calorie malnutrition (CMS-HCC)  Caregivers state that over the past 3 days he has not been eating well. Patient states otherwise. No vomiting. No abdominal pain. Weight is stable compared to last visit in November.    Moderate episode of recurrent major depressive disorder (CMS-HCC)  Caregivers state that he does suffer from dementia. Symptoms are controlled. He is currently on Cymbalta daily.       Current medicines (including changes today)  Current Outpatient Prescriptions   Medication Sig Dispense Refill   • metformin (GLUCOPHAGE) 500 MG Tab TAKE ONE TABLET BY MOUTH TWICE A DAY WITH MEALS 180 Tab 1   • famotidine (PEPCID) 20 MG Tab Take 1 Tab by mouth 2 times a day. 180 Tab 3   • quetiapine (SEROQUEL) 200 MG Tab Take 1 Tab by mouth every day. 90 Tab 3   • triamcinolone acetonide (KENALOG) 0.1 % Cream Apply 1 Application to affected area(s) 2 times a day. 1 Tube 0   • lisinopril (PRINIVIL) 10 MG Tab Take 1 Tab by mouth every day. 90 Tab 3   • amlodipine (NORVASC) 5 MG Tab Take 1 Tab by mouth every day. 90 Tab 3   • ascorbic acid (VITAMIN C) 500 MG tablet Take 0.5 Tabs by mouth every day. supplement for wound healing 30 Tab 3   • Magnesium Citrate (CITRATE OF MAGNESIA) 1.745 GM/30ML Solution Take 150 mL by mouth every day. Until you have a BM. 195 mL 0   • bisacodyl (DULCOLAX) 10 MG Suppos Insert 1 Suppository in rectum every day. 10 Suppository 0   • polyethylene glycol/lytes (MIRALAX) Pack Take 1 Packet by mouth every 6 hours. Until bowel movement. 30 Each 2   • levothyroxine (SYNTHROID) 50  "MCG Tab Take 1 Tab by mouth Every morning on an empty stomach. 90 Tab 3   • duloxetine (CYMBALTA) 60 MG Cap DR Particles delayed-release capsule Take 1 Cap by mouth every day. 90 Cap 3   • docusate sodium (COLACE) 100 MG Cap Take 1 Cap by mouth 2 times a day. 180 Cap 3     No current facility-administered medications for this visit.      He  has a past medical history of Arthritis; Bronchitis; CATARACT; Constipation (8/28/2013); Coughing blood; Dental disorder; Diabetes; Disease related peripheral neuropathy (CMS-HCC); Other specified disorder of intestines; Pain; Psychiatric disorder; Sleep apnea; Thyroid disease; Unspecified urinary incontinence; and Urinary bladder disorder.    Social History and Family History were reviewed and updated.    ROS   No chest pain, no shortness of breath, no abdominal pain and all other systems were reviewed and are negative.       Objective:     Blood pressure 118/68, pulse 93, temperature 36.9 °C (98.5 °F), height 1.727 m (5' 8\"), weight 49.9 kg (110 lb), SpO2 92 %. Body mass index is 16.73 kg/m².   Physical Exam:  Constitutional: Alert, no distress. Seated in a wheelchair.  Skin: Warm, dry, good turgor, no rashes in visible areas.  Eye: Equal, round and reactive, conjunctiva clear, lids normal.  ENMT: Lips without lesions, good dentition, oropharynx clear. Nasal passages with clear mucus.  Neck: Trachea midline, no masses.   Lymph: No cervical or supraclavicular lymphadenopathy  Respiratory: Unlabored respiratory effort, lungs clear to auscultation, no wheezes, no ronchi.  Cardiovascular: Normal S1, S2, no murmur, no edema.  Abdomen: Soft, non-tender, no masses.  Psych: Alert and oriented x3, normal affect and mood.        Assessment and Plan:   The following treatment plan was discussed    1. Acute URI  Acute, new onset condition. Discussed a viral process. Push fluids. Clear nose if needed. Make sure he is seated with good back and posterior support. May consider " over-the-counter cold medications. Expect symptoms to resolve in time. Follow up with any worsening symptoms such as fevers, shortness of breath or chest pain.    2. Mild protein-calorie malnutrition (CMS-HCC)  Chronic condition. Appears stable today. Advised to continue to push fluids. Push fluids. With his virus he may not be as hungry but expect appetite to improve in time.    3. Moderate episode of recurrent major depressive disorder (CMS-HCC)  Chronic condition. Stable. Continue Cymbalta as directed. No change in medications needed today.    4. Dementia with behavioral disturbance, unspecified dementia type  Chronic condition. Stable. Patient's behavior was very pleasant. Follow with PCP as needed.      Followup: Return if symptoms worsen or fail to improve.    Please note that this dictation was created using voice recognition software. I have made every reasonable attempt to correct obvious errors, but I expect that there are errors of grammar and possibly content that I did not discover before finalizing the note.

## 2018-02-05 NOTE — ASSESSMENT & PLAN NOTE
Caregivers state that he does suffer from dementia. Symptoms are controlled. He is currently on Cymbalta daily.

## 2018-02-05 NOTE — ASSESSMENT & PLAN NOTE
Caregivers state that over the past 3 days he has not been eating well. Patient states otherwise. No vomiting. No abdominal pain. Weight is stable compared to last visit in November.

## 2018-03-23 DIAGNOSIS — F32.89 OTHER DEPRESSION: ICD-10-CM

## 2018-03-23 RX ORDER — DULOXETIN HYDROCHLORIDE 60 MG/1
CAPSULE, DELAYED RELEASE ORAL
Qty: 90 CAP | Refills: 0 | Status: SHIPPED | OUTPATIENT
Start: 2018-03-23 | End: 2018-06-28 | Stop reason: SDUPTHER

## 2018-05-07 ENCOUNTER — OFFICE VISIT (OUTPATIENT)
Dept: MEDICAL GROUP | Facility: PHYSICIAN GROUP | Age: 83
End: 2018-05-07
Payer: MEDICARE

## 2018-05-07 VITALS
SYSTOLIC BLOOD PRESSURE: 114 MMHG | HEIGHT: 68 IN | HEART RATE: 84 BPM | TEMPERATURE: 98.2 F | OXYGEN SATURATION: 94 % | BODY MASS INDEX: 15.16 KG/M2 | DIASTOLIC BLOOD PRESSURE: 72 MMHG | RESPIRATION RATE: 16 BRPM | WEIGHT: 100 LBS

## 2018-05-07 DIAGNOSIS — E11.9 CONTROLLED TYPE 2 DIABETES MELLITUS WITHOUT COMPLICATION, WITHOUT LONG-TERM CURRENT USE OF INSULIN (HCC): ICD-10-CM

## 2018-05-07 DIAGNOSIS — E03.9 ACQUIRED HYPOTHYROIDISM: ICD-10-CM

## 2018-05-07 DIAGNOSIS — E44.0 MODERATE PROTEIN-CALORIE MALNUTRITION (HCC): ICD-10-CM

## 2018-05-07 DIAGNOSIS — I10 ESSENTIAL HYPERTENSION: ICD-10-CM

## 2018-05-07 DIAGNOSIS — C02.9 SQUAMOUS CELL CANCER OF TONGUE (HCC): ICD-10-CM

## 2018-05-07 DIAGNOSIS — F33.1 MODERATE EPISODE OF RECURRENT MAJOR DEPRESSIVE DISORDER (HCC): ICD-10-CM

## 2018-05-07 DIAGNOSIS — F03.91 DEMENTIA WITH BEHAVIORAL DISTURBANCE, UNSPECIFIED DEMENTIA TYPE: ICD-10-CM

## 2018-05-07 DIAGNOSIS — K21.9 GASTROESOPHAGEAL REFLUX DISEASE, ESOPHAGITIS PRESENCE NOT SPECIFIED: ICD-10-CM

## 2018-05-07 DIAGNOSIS — L98.1 NEUROTIC EXCORIATIONS: ICD-10-CM

## 2018-05-07 PROBLEM — K59.03 DRUG-INDUCED CONSTIPATION: Status: RESOLVED | Noted: 2017-02-24 | Resolved: 2018-05-07

## 2018-05-07 PROCEDURE — 99214 OFFICE O/P EST MOD 30 MIN: CPT | Performed by: NURSE PRACTITIONER

## 2018-05-07 ASSESSMENT — PAIN SCALES - GENERAL: PAINLEVEL: NO PAIN

## 2018-05-07 NOTE — ASSESSMENT & PLAN NOTE
"This is a new problem. Over the last several months patient and family have been noticing that patient has been picking his skin. States that he has multiple areas on his bilateral hands, arms, the back of his neck which are small open wounds. Patient states that he has been picking at them because they are \"ugly\" patient and family state that he is referring to age spots, moles that he has had for multiple years. Patient states that these do not feel itchy but also states that he rubs them because it \"feels better\".  "

## 2018-05-07 NOTE — PROGRESS NOTES
"Chief Complaint   Patient presents with   • Other     picking at hands and neck x  6 months        HISTORY OF PRESENT ILLNESS: Patient is a 84 y.o. male established patient who presents today to discuss multiple issues.     Moderate episode of recurrent major depressive disorder (CMS-HCC) ([facility])  Continues to suffer from dementia. Patient is taking Cymbalta daily.. Patient and caregiver stays overall feeling well, appears in a happy and talkative mood today.    Squamous cell cancer of tongue (CMS-HCC)  Chronic in nature. Stable per patient. Patient does not wish to continue treatment for this issue. Patient denies swelling or difficulty with swallowing at this time.    Protein calorie malnutrition (CMS-HCC)  Chronic in nature. Worsening. Discussed with patient: Chery, using high-calorie and Ensure to increase calories. Patient and family state that he has lost weight slowly over time states that this is not a sudden weight loss is not what further workup on this issue. Patient denies nausea, abdominal pain. Discussed with patient possibility of completing POLST.    Controlled type 2 diabetes mellitus without complication, without long-term current use of insulin (CMS-HCC)  Chronic in nature. Most recent hemoglobin A1c is 5.5 discussed with patient possibility of discontinuing medications. Patient denies symptoms of high or low sugars. States that diet has been healthier. Patient is hesitant to discontinue her lower dose of metformin.    Dementia with behavioral disturbance  Chronic in nature. Stable. Patient is doing well with Seroquel 200 mg at night.    Neurotic excoriations  This is a new problem. Over the last several months patient and family have been noticing that patient has been picking his skin. States that he has multiple areas on his bilateral hands, arms, the back of his neck which are small open wounds. Patient states that he has been picking at them because they are \"ugly\" patient and family state that " "he is referring to age spots, moles that he has had for multiple years. Patient states that these do not feel itchy but also states that he rubs them because it \"feels better\".      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   • Acute URI 02/05/2018   • Moderate episode of recurrent major depressive disorder ([facility]) 02/05/2018   • Risk for falls 11/27/2017   • Major depressive disorder 11/27/2017   • Eczema 11/27/2017   • Dementia with behavioral disturbance 08/25/2017   • Anemia, blood loss 05/12/2017   • Iron deficiency 05/12/2017   • Femur fracture, left ([facility]) 05/11/2017   • Chronic midline low back pain without sciatica 02/24/2017   • Protein calorie malnutrition ([facility]) 12/01/2016   • Controlled type 2 diabetes mellitus without complication, without long-term current use of insulin ([facility]) 12/01/2016   • Gait disorder 05/11/2016   • Insomnia 04/06/2016   • Squamous cell cancer of tongue (CMS-[facility]) 11/08/2012   • GERD (gastroesophageal reflux disease) 11/08/2010   • Anxiety 01/25/2010   • Chronic idiopathic gout involving toe of left foot without tophus 06/15/2009       Allergies:Nkda [no known drug allergy]    Current Outpatient Prescriptions   Medication Sig Dispense Refill   • mupirocin (BACTROBAN) 2 % Ointment Apply 1 Application to affected area(s) every day. 1 Tube 0   • DULoxetine (CYMBALTA) 60 MG Cap DR Particles delayed-release capsule TAKE 1 CAPSULE BY MOUTH ONCE DAILY 90 Cap 0   • metformin (GLUCOPHAGE) 500 MG Tab TAKE ONE TABLET BY MOUTH TWICE A DAY WITH MEALS 180 Tab 1   • famotidine (PEPCID) 20 MG Tab Take 1 Tab by mouth 2 times a day. 180 Tab 3   • quetiapine (SEROQUEL) 200 MG Tab Take 1 Tab by mouth every day. 90 Tab 3   • triamcinolone acetonide (KENALOG) 0.1 % Cream Apply 1 Application to affected area(s) 2 times a day. 1 Tube 0   • lisinopril (PRINIVIL) 10 MG Tab " Take 1 Tab by mouth every day. 90 Tab 3   • amlodipine (NORVASC) 5 MG Tab Take 1 Tab by mouth every day. 90 Tab 3   • ascorbic acid (VITAMIN C) 500 MG tablet Take 0.5 Tabs by mouth every day. supplement for wound healing 30 Tab 3   • Magnesium Citrate (CITRATE OF MAGNESIA) 1.745 GM/30ML Solution Take 150 mL by mouth every day. Until you have a BM. 195 mL 0   • bisacodyl (DULCOLAX) 10 MG Suppos Insert 1 Suppository in rectum every day. 10 Suppository 0   • polyethylene glycol/lytes (MIRALAX) Pack Take 1 Packet by mouth every 6 hours. Until bowel movement. 30 Each 2   • levothyroxine (SYNTHROID) 50 MCG Tab Take 1 Tab by mouth Every morning on an empty stomach. 90 Tab 3   • docusate sodium (COLACE) 100 MG Cap Take 1 Cap by mouth 2 times a day. 180 Cap 3     No current facility-administered medications for this visit.        Social History   Substance Use Topics   • Smoking status: Former Smoker     Packs/day: 1.00     Years: 10.00     Types: Cigarettes     Quit date: 1965   • Smokeless tobacco: Never Used   • Alcohol use No       Family Status   Relation Status   • Father    • Mother    • Neg Hx      Family History   Problem Relation Age of Onset   • Heart Disease Father    • Diabetes Neg Hx    • Stroke Neg Hx    • Cancer Neg Hx      stomach       Review of Systems:   Constitutional:  Negative for fever, chills, weight loss and malaise/fatigue.   HEENT:  Negative for ear pain, nosebleeds, congestion, sore throat and neck pain.    Eyes:  Negative for blurred vision.   Respiratory:  Negative for cough, sputum production, shortness of breath and wheezing.    Cardiovascular:  Negative for chest pain, palpitations, orthopnea and leg swelling.   Gastrointestinal:  Negative for heartburn, nausea, vomiting and abdominal pain.   Genitourinary:  Negative for dysuria, urgency and frequency.   Musculoskeletal:  Negative for myalgias, back pain and joint pain.   Skin:  Positive for rash and itching.  "  Neurological:  Negative for dizziness, tingling, tremors, sensory change, focal weakness and headaches.   Endo/Heme/Allergies:  Does not bruise/bleed easily.   Psychiatric/Behavioral:  Negative for depression, suicidal ideas and memory loss.  The patient is not nervous/anxious and does not have insomnia.    All other systems reviewed and are negative except as in HPI.    Exam:  Blood pressure 114/72, pulse 84, temperature 36.8 °C (98.2 °F), resp. rate 16, height 1.727 m (5' 8\"), weight 45.4 kg (100 lb), SpO2 94 %.  General:  Normal appearing. No distress.  HEENT:  Normocephalic. Eyes conjunctiva clear lids without ptosis, pupils equal and reactive to light accommodation, ears normal shape and contour, canals are clear bilaterally, tympanic membranes are benign, nasal mucosa benign, oropharynx is without erythema, edema or exudates. Lesion noted on tongue.  Pulmonary:  Clear to ausculation.  Normal effort. No rales, ronchi, or wheezing.  Cardiovascular:  Regular rate and rhythm without murmur. Carotid and radial pulses are intact and equal bilaterally.  Neurologic:  Grossly nonfocal  Lymph:  No cervical, supraclavicular or axillary lymph nodes are palpable  Skin:  Warm and dry. Patient has multiple small open wounds on the back of his neck, bilateral arms and hands. All are scabbed, without indication of infection including heat, erythema. There is one mildly inflamed lesion on his left forearm that does have some noted exudate.  Musculoskeletal:  Normal gait. No extremity cyanosis, clubbing, or edema.   Psych: Normal mood and affect. Alert and oriented x3. Judgment and insight is normal.      PLAN:    1. Acquired hypothyroidism  Repeat labs  - TSH+FREE T4    2. Essential hypertension  Repeat labs  - CBC WITH DIFFERENTIAL; Future  - COMP METABOLIC PANEL; Future  - LIPID PROFILE; Future    3. Gastroesophageal reflux disease, esophagitis presence not specified  Continue current plan    4. Moderate protein-calorie " malnutrition (HCC)   patient regarding increasing calorie intake.  - COMP METABOLIC PANEL; Future    5. Controlled type 2 diabetes mellitus without complication, without long-term current use of insulin (HCC)  Plan to discontinue metformin if hemoglobin A1c is stable  - HEMOGLOBIN A1C; Future    6. Neurotic excoriations   patient regarding use of Kenalog to see if that will alleviate itching and stop picking, patient will also use Bactroban on inflamed area on the left forearm. Patient will follow-up if this does not improve symptoms.  - mupirocin (BACTROBAN) 2 % Ointment; Apply 1 Application to affected area(s) every day.  Dispense: 1 Tube; Refill: 0    7. Moderate episode of recurrent major depressive disorder (HCC)  Continue current medication.    8. Squamous cell cancer of tongue (CMS-HCC)  Continue current follow-up.    9. Dementia with behavioral disturbance, unspecified dementia type  Continue Seroquel.    Follow-up in 3 months or sooner as needed if picking continues despite treatment. Patient is encouraged to be seen in the emergency room for chest pain, palpitations, shortness of breath, dizziness, severe abdominal pain or other concerning symptoms.    Please note that this dictation was created using voice recognition software. I have made every reasonable attempt to correct obvious errors, but I expect that there are errors of grammar and possibly content that I did not discover before finalizing the note.    Assessment/Plan:  1. Acquired hypothyroidism  TSH+FREE T4   2. Essential hypertension  CBC WITH DIFFERENTIAL    COMP METABOLIC PANEL    LIPID PROFILE   3. Gastroesophageal reflux disease, esophagitis presence not specified     4. Moderate protein-calorie malnutrition (HCC)  COMP METABOLIC PANEL   5. Controlled type 2 diabetes mellitus without complication, without long-term current use of insulin (HCC)  HEMOGLOBIN A1C   6. Neurotic excoriations  mupirocin (BACTROBAN) 2 % Ointment   7.  Moderate episode of recurrent major depressive disorder (HCC)     8. Squamous cell cancer of tongue (CMS-HCC)     9. Dementia with behavioral disturbance, unspecified dementia type            I have placed the below orders and discussed them with an approved delegating provider. The MA is performing the below orders under the direction of Dr. Bynum.

## 2018-05-07 NOTE — ASSESSMENT & PLAN NOTE
Chronic in nature. Stable per patient. Patient does not wish to continue treatment for this issue. Patient denies swelling or difficulty with swallowing at this time.

## 2018-05-07 NOTE — ASSESSMENT & PLAN NOTE
Chronic in nature. Worsening. Discussed with patient: Vik, using high-calorie and Ensure to increase calories. Patient and family state that he has lost weight slowly over time states that this is not a sudden weight loss is not what further workup on this issue. Patient denies nausea, abdominal pain. Discussed with patient possibility of completing POLST.

## 2018-05-07 NOTE — ASSESSMENT & PLAN NOTE
Chronic in nature. Most recent hemoglobin A1c is 5.5 discussed with patient possibility of discontinuing medications. Patient denies symptoms of high or low sugars. States that diet has been healthier. Patient is hesitant to discontinue her lower dose of metformin.

## 2018-05-07 NOTE — ASSESSMENT & PLAN NOTE
Continues to suffer from dementia. Patient is taking Cymbalta daily.. Patient and caregiver stays overall feeling well, appears in a happy and talkative mood today.

## 2018-05-26 ENCOUNTER — HOSPITAL ENCOUNTER (OUTPATIENT)
Dept: LAB | Facility: MEDICAL CENTER | Age: 83
End: 2018-05-26
Attending: NURSE PRACTITIONER
Payer: MEDICARE

## 2018-05-26 DIAGNOSIS — I10 ESSENTIAL HYPERTENSION: ICD-10-CM

## 2018-05-26 DIAGNOSIS — E11.9 CONTROLLED TYPE 2 DIABETES MELLITUS WITHOUT COMPLICATION, WITHOUT LONG-TERM CURRENT USE OF INSULIN (HCC): ICD-10-CM

## 2018-05-26 DIAGNOSIS — E44.0 MODERATE PROTEIN-CALORIE MALNUTRITION (HCC): ICD-10-CM

## 2018-05-26 LAB
ALBUMIN SERPL BCP-MCNC: 3.6 G/DL (ref 3.2–4.9)
ALBUMIN/GLOB SERPL: 0.9 G/DL
ALP SERPL-CCNC: 79 U/L (ref 30–99)
ALT SERPL-CCNC: 17 U/L (ref 2–50)
ANION GAP SERPL CALC-SCNC: 7 MMOL/L (ref 0–11.9)
AST SERPL-CCNC: 21 U/L (ref 12–45)
BASOPHILS # BLD AUTO: 0.9 % (ref 0–1.8)
BASOPHILS # BLD: 0.04 K/UL (ref 0–0.12)
BILIRUB SERPL-MCNC: 0.3 MG/DL (ref 0.1–1.5)
BUN SERPL-MCNC: 21 MG/DL (ref 8–22)
CALCIUM SERPL-MCNC: 9.1 MG/DL (ref 8.5–10.5)
CHLORIDE SERPL-SCNC: 94 MMOL/L (ref 96–112)
CHOLEST SERPL-MCNC: 129 MG/DL (ref 100–199)
CO2 SERPL-SCNC: 27 MMOL/L (ref 20–33)
CREAT SERPL-MCNC: 1.16 MG/DL (ref 0.5–1.4)
EOSINOPHIL # BLD AUTO: 0.1 K/UL (ref 0–0.51)
EOSINOPHIL NFR BLD: 2.2 % (ref 0–6.9)
ERYTHROCYTE [DISTWIDTH] IN BLOOD BY AUTOMATED COUNT: 47.4 FL (ref 35.9–50)
GLOBULIN SER CALC-MCNC: 3.9 G/DL (ref 1.9–3.5)
GLUCOSE SERPL-MCNC: 92 MG/DL (ref 65–99)
HCT VFR BLD AUTO: 27.1 % (ref 42–52)
HDLC SERPL-MCNC: 54 MG/DL
HGB BLD-MCNC: 9 G/DL (ref 14–18)
IMM GRANULOCYTES # BLD AUTO: 0.04 K/UL (ref 0–0.11)
IMM GRANULOCYTES NFR BLD AUTO: 0.9 % (ref 0–0.9)
LDLC SERPL CALC-MCNC: 42 MG/DL
LYMPHOCYTES # BLD AUTO: 0.95 K/UL (ref 1–4.8)
LYMPHOCYTES NFR BLD: 20.5 % (ref 22–41)
MCH RBC QN AUTO: 30.8 PG (ref 27–33)
MCHC RBC AUTO-ENTMCNC: 33.2 G/DL (ref 33.7–35.3)
MCV RBC AUTO: 92.8 FL (ref 81.4–97.8)
MONOCYTES # BLD AUTO: 0.73 K/UL (ref 0–0.85)
MONOCYTES NFR BLD AUTO: 15.8 % (ref 0–13.4)
NEUTROPHILS # BLD AUTO: 2.77 K/UL (ref 1.82–7.42)
NEUTROPHILS NFR BLD: 59.7 % (ref 44–72)
NRBC # BLD AUTO: 0 K/UL
NRBC BLD-RTO: 0 /100 WBC
PLATELET # BLD AUTO: 412 K/UL (ref 164–446)
PMV BLD AUTO: 8.7 FL (ref 9–12.9)
POTASSIUM SERPL-SCNC: 4.7 MMOL/L (ref 3.6–5.5)
PROT SERPL-MCNC: 7.5 G/DL (ref 6–8.2)
RBC # BLD AUTO: 2.92 M/UL (ref 4.7–6.1)
SODIUM SERPL-SCNC: 128 MMOL/L (ref 135–145)
T4 FREE SERPL-MCNC: 0.53 NG/DL (ref 0.53–1.43)
TRIGL SERPL-MCNC: 164 MG/DL (ref 0–149)
TSH SERPL DL<=0.005 MIU/L-ACNC: 6.88 UIU/ML (ref 0.38–5.33)
WBC # BLD AUTO: 4.6 K/UL (ref 4.8–10.8)

## 2018-05-26 PROCEDURE — 85025 COMPLETE CBC W/AUTO DIFF WBC: CPT

## 2018-05-26 PROCEDURE — 84439 ASSAY OF FREE THYROXINE: CPT

## 2018-05-26 PROCEDURE — 80053 COMPREHEN METABOLIC PANEL: CPT

## 2018-05-26 PROCEDURE — 36415 COLL VENOUS BLD VENIPUNCTURE: CPT

## 2018-05-26 PROCEDURE — 83036 HEMOGLOBIN GLYCOSYLATED A1C: CPT

## 2018-05-26 PROCEDURE — 80061 LIPID PANEL: CPT

## 2018-05-26 PROCEDURE — 84443 ASSAY THYROID STIM HORMONE: CPT

## 2018-05-27 LAB
EST. AVERAGE GLUCOSE BLD GHB EST-MCNC: 120 MG/DL
HBA1C MFR BLD: 5.8 % (ref 0–5.6)

## 2018-05-29 ENCOUNTER — TELEPHONE (OUTPATIENT)
Dept: MEDICAL GROUP | Facility: PHYSICIAN GROUP | Age: 83
End: 2018-05-29

## 2018-05-29 NOTE — TELEPHONE ENCOUNTER
----- Message from GARCIA Otero sent at 5/29/2018  4:02 PM PDT -----  Please call pt and give lab results: Hemoglobin A1c is stable. TSH is mildly elevated, but T4 is normal we will continue to monitor. Sodium is low at 128, chloride is also low, please increase Santosh's sodium intake, add salt to his food, or provide salty snacks, increase his ensure. I want him to repeat his CMP in 2 weeks. If you notice any change increased confusion, weakness or lethargy please present to the ER.

## 2018-05-29 NOTE — TELEPHONE ENCOUNTER
Spoke with patient's wife Ailyn and let them know GARCIA Otero's message.  She will have patient repeat labs in 2 weeks.

## 2018-06-01 DIAGNOSIS — G89.29 CHRONIC MIDLINE LOW BACK PAIN WITHOUT SCIATICA: ICD-10-CM

## 2018-06-01 DIAGNOSIS — M54.50 CHRONIC MIDLINE LOW BACK PAIN WITHOUT SCIATICA: ICD-10-CM

## 2018-06-04 RX ORDER — DOCUSATE SODIUM 100 MG/1
CAPSULE, LIQUID FILLED ORAL
Qty: 180 CAP | Refills: 0 | Status: SHIPPED | OUTPATIENT
Start: 2018-06-04 | End: 2018-08-31 | Stop reason: SDUPTHER

## 2018-06-08 DIAGNOSIS — E87.1 HYPONATREMIA: ICD-10-CM

## 2018-06-28 DIAGNOSIS — F32.89 OTHER DEPRESSION: ICD-10-CM

## 2018-06-28 RX ORDER — DULOXETIN HYDROCHLORIDE 60 MG/1
CAPSULE, DELAYED RELEASE ORAL
Qty: 90 CAP | Refills: 0 | Status: SHIPPED | OUTPATIENT
Start: 2018-06-28 | End: 2018-07-18 | Stop reason: SDUPTHER

## 2018-07-18 DIAGNOSIS — I10 ESSENTIAL HYPERTENSION: ICD-10-CM

## 2018-07-18 DIAGNOSIS — F32.89 OTHER DEPRESSION: ICD-10-CM

## 2018-07-18 RX ORDER — DULOXETIN HYDROCHLORIDE 60 MG/1
CAPSULE, DELAYED RELEASE ORAL
Qty: 90 CAP | Refills: 0 | Status: SHIPPED | OUTPATIENT
Start: 2018-07-18 | End: 2018-09-24 | Stop reason: SDUPTHER

## 2018-07-18 RX ORDER — LISINOPRIL 10 MG/1
TABLET ORAL
Qty: 90 TAB | Refills: 0 | Status: SHIPPED | OUTPATIENT
Start: 2018-07-18 | End: 2018-10-26 | Stop reason: SDUPTHER

## 2018-07-21 DIAGNOSIS — K59.03 DRUG-INDUCED CONSTIPATION: ICD-10-CM

## 2018-07-23 ENCOUNTER — APPOINTMENT (OUTPATIENT)
Dept: MEDICAL GROUP | Facility: PHYSICIAN GROUP | Age: 83
End: 2018-07-23
Payer: MEDICARE

## 2018-07-23 RX ORDER — POLYETHYLENE GLYCOL 3350 17 G/17G
POWDER, FOR SOLUTION ORAL
Qty: 30 EACH | Refills: 0 | Status: SHIPPED | OUTPATIENT
Start: 2018-07-23 | End: 2019-01-16 | Stop reason: SDUPTHER

## 2018-07-27 ENCOUNTER — TELEPHONE (OUTPATIENT)
Dept: MEDICAL GROUP | Facility: PHYSICIAN GROUP | Age: 83
End: 2018-07-27

## 2018-07-27 NOTE — TELEPHONE ENCOUNTER
ESTABLISHED PATIENT PRE-VISIT PLANNING     Note: Patient will not be contacted if there is no indication to call.     1.  Reviewed notes from the last few office visits within the medical group: Yes    2.  If any orders were placed at last visit or intended to be done for this visit (i.e. 6 mos follow-up), do we have Results/Consult Notes?        •  Labs - Labs ordered, completed on 05/26/18 and results are in chart.   Note: If patient appointment is for lab review and patient did not complete labs, check with provider if OK to reschedule patient until labs completed.       •  Imaging - Imaging was not ordered at last office visit.       •  Referrals - Referral ordered, patient has NOT been seen.    3. Is this appointment scheduled as a Hospital Follow-Up? No    4.  Immunizations were updated in Epic using WebIZ?: Epic matches WebIZ       •  Web Iz Recommendations: FLU, PREVNAR (PCV13) , TDAP and ZOSTAVAX (Shingles)    5.  Patient is due for the following Health Maintenance Topics:   Health Maintenance Due   Topic Date Due   • IMM DTaP/Tdap/Td Vaccine (1 - Tdap) 04/27/1953   • IMM ZOSTER VACCINES (1 of 2) 04/27/1984   • IMM PNEUMOCOCCAL 65+ (ADULT) LOW/MEDIUM RISK SERIES (2 of 2 - PCV13) 11/23/2012   • RETINAL SCREENING  03/20/2015   • URINE ACR / MICROALBUMIN  06/08/2016       - Patient has completed FLU and PNEUMOVAX (PPSV23) Immunization(s) per WebIZ. Chart has been updated.    6.  MDX printed for Provider? NO    7.  Patient was NOT informed to arrive 15 min prior to their scheduled appointment and bring in their medication bottles.

## 2018-07-30 ENCOUNTER — APPOINTMENT (OUTPATIENT)
Dept: MEDICAL GROUP | Facility: PHYSICIAN GROUP | Age: 83
End: 2018-07-30
Payer: MEDICARE

## 2018-07-30 DIAGNOSIS — I10 ESSENTIAL HYPERTENSION: ICD-10-CM

## 2018-07-30 DIAGNOSIS — E03.9 ACQUIRED HYPOTHYROIDISM: ICD-10-CM

## 2018-07-30 RX ORDER — AMLODIPINE BESYLATE 5 MG/1
TABLET ORAL
Qty: 90 TAB | Refills: 0 | Status: SHIPPED | OUTPATIENT
Start: 2018-07-30 | End: 2018-10-22 | Stop reason: SDUPTHER

## 2018-07-30 RX ORDER — LEVOTHYROXINE SODIUM 0.05 MG/1
TABLET ORAL
Qty: 90 TAB | Refills: 0 | Status: SHIPPED | OUTPATIENT
Start: 2018-07-30 | End: 2018-11-12 | Stop reason: SDUPTHER

## 2018-08-20 ENCOUNTER — PATIENT OUTREACH (OUTPATIENT)
Dept: HEALTH INFORMATION MANAGEMENT | Facility: OTHER | Age: 83
End: 2018-08-20

## 2018-08-20 NOTE — PROGRESS NOTES
1. Attempt #:1    2. WebIZ Checked & Epic Updated: Yes  3. HealthConnect Verified: yes  4. Verify PCP: yes    5. Communication Preference Obtained: yes    6. Diabetes Visit Scheduling  Scheduling Status:Not Scheduled. Patient states they are not interested      7. Care Gap Scheduling (Attempt to Schedule EACH Overdue Care Gap!)    Health Maintenance Due   Topic Date Due   • RETINAL SCREENING  03/20/2015   • URINE ACR / MICROALBUMIN  06/08/2016   • IMM INFLUENZA (1) 09/01/2018        8. Patient was directed to Health and Wellness Website: yes     - Patient declines Immunizations: PNEUMOVAX (PPSV23), TDAP and SHINGRIX (Shingles) and Retinal Eye Exam.    9. Screened for Food [employer] Prescription? yes  10. [employer] Activation: declined  11. [employer] Kamari: no  12. Virtual Visits: no  13. Opt In to Text Messages: no

## 2018-08-31 DIAGNOSIS — G89.29 CHRONIC MIDLINE LOW BACK PAIN WITHOUT SCIATICA: ICD-10-CM

## 2018-08-31 DIAGNOSIS — M54.50 CHRONIC MIDLINE LOW BACK PAIN WITHOUT SCIATICA: ICD-10-CM

## 2018-08-31 RX ORDER — DOCUSATE SODIUM 100 MG/1
CAPSULE, LIQUID FILLED ORAL
Qty: 180 CAP | Refills: 0 | Status: SHIPPED | OUTPATIENT
Start: 2018-08-31 | End: 2018-11-30 | Stop reason: SDUPTHER

## 2018-09-21 ENCOUNTER — TELEPHONE (OUTPATIENT)
Dept: MEDICAL GROUP | Facility: PHYSICIAN GROUP | Age: 83
End: 2018-09-21

## 2018-09-21 NOTE — TELEPHONE ENCOUNTER
ESTABLISHED PATIENT PRE-VISIT PLANNING     Note: Patient will not be contacted if there is no indication to call.     1.  Reviewed notes from the last few office visits within the medical group: Yes    2.  If any orders were placed at last visit or intended to be done for this visit (i.e. 6 mos follow-up), do we have Results/Consult Notes?        •  Labs - Labs ordered, NOT completed. Patient advised to complete prior to next appointment.   Note: If patient appointment is for lab review and patient did not complete labs, check with provider if OK to reschedule patient until labs completed.       •  Imaging - Imaging was not ordered at last office visit.       •  Referrals - No referrals were ordered at last office visit.    3. Is this appointment scheduled as a Hospital Follow-Up? No    4.  Immunizations were updated in Epic using WebIZ?: Epic matches WebIZ       •  Web Iz Recommendations: FLU, PREVNAR (PCV13) , TDAP and ZOSTAVAX (Shingles)    5.  Patient is due for the following Health Maintenance Topics:   Health Maintenance Due   Topic Date Due   • IMM HEP B VACCINE (1 of 3 - Risk 3-dose series) 04/27/1953   • RETINAL SCREENING  03/20/2015   • URINE ACR / MICROALBUMIN  06/08/2016   • IMM INFLUENZA (1) 09/01/2018       - Patient has completed FLU and PNEUMOVAX (PPSV23) Immunization(s) per WebIZ. Chart has been updated.    6.  MDX printed for Provider? NO    7.  Patient was NOT informed to arrive 15 min prior to their scheduled appointment and bring in their medication bottles.

## 2018-09-24 ENCOUNTER — OFFICE VISIT (OUTPATIENT)
Dept: MEDICAL GROUP | Facility: PHYSICIAN GROUP | Age: 83
End: 2018-09-24
Payer: MEDICARE

## 2018-09-24 VITALS
OXYGEN SATURATION: 96 % | RESPIRATION RATE: 18 BRPM | DIASTOLIC BLOOD PRESSURE: 70 MMHG | HEART RATE: 88 BPM | TEMPERATURE: 97.9 F | SYSTOLIC BLOOD PRESSURE: 124 MMHG

## 2018-09-24 DIAGNOSIS — F33.41 RECURRENT MAJOR DEPRESSIVE DISORDER, IN PARTIAL REMISSION (HCC): ICD-10-CM

## 2018-09-24 PROCEDURE — 99214 OFFICE O/P EST MOD 30 MIN: CPT | Performed by: NURSE PRACTITIONER

## 2018-09-24 RX ORDER — DULOXETIN HYDROCHLORIDE 60 MG/1
60 CAPSULE, DELAYED RELEASE ORAL DAILY
Qty: 90 CAP | Refills: 3 | Status: SHIPPED | OUTPATIENT
Start: 2018-09-24 | End: 2019-09-30 | Stop reason: SDUPTHER

## 2018-09-24 NOTE — ASSESSMENT & PLAN NOTE
Chronic in nature.  Stable.  Patient states that he has been feeling a little more down than normal states this is related to not getting out as much.  Wife states that he has been feeling well recently.  States that they have not been taking them out as much as normal related to her feeling dizzy.  States that they will go back to more frequent outings states that her son is going to help him get out more frequently.  Patient has been sleeping well.  Has not been acting more agitated than normal.

## 2018-09-24 NOTE — PROGRESS NOTES
Chief Complaint   Patient presents with   • Medication Refill     Duloxetine        HISTORY OF PRESENT ILLNESS: Patient is a 84 y.o. male established patient who presents today to discuss depression.    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  Stable.  Patient states that he has been feeling a little more down than normal states this is related to not getting out as much.  Wife states that he has been feeling well recently.  States that they have not been taking them out as much as normal related to her feeling dizzy.  States that they will go back to more frequent outings states that her son is going to help him get out more frequently.  Patient has been sleeping well.  Has not been acting more agitated than normal.      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   • Acute URI 02/05/2018   • Moderate episode of recurrent major depressive disorder ([facility]) 02/05/2018   • Risk for falls 11/27/2017   • Major depressive disorder 11/27/2017   • Eczema 11/27/2017   • Dementia with behavioral disturbance 08/25/2017   • Anemia, blood loss 05/12/2017   • Iron deficiency 05/12/2017   • Femur fracture, left ([facility]) 05/11/2017   • Chronic midline low back pain without sciatica 02/24/2017   • Protein calorie malnutrition ([facility]) 12/01/2016   • Controlled type 2 diabetes mellitus without complication, without long-term current use of insulin ([facility]) 12/01/2016   • Gait disorder 05/11/2016   • Insomnia 04/06/2016   • Squamous cell cancer of tongue (CMS-HCC) 11/08/2012   • GERD (gastroesophageal reflux disease) 11/08/2010   • Anxiety 01/25/2010   • Chronic idiopathic gout involving toe of left foot without tophus 06/15/2009       Allergies:Nkda [no known drug allergy]    Current Outpatient Prescriptions   Medication Sig Dispense Refill   • DULoxetine  (CYMBALTA) 60 MG Cap DR Particles delayed-release capsule Take 1 Cap by mouth every day. 90 Cap 3   • EQUATE STOOL SOFTENER 100 MG Cap TAKE 1 CAPSULE BY MOUTH TWICE DAILY 180 Cap 0   • amLODIPine (NORVASC) 5 MG Tab TAKE 1 TABLET BY MOUTH ONCE DAILY 90 Tab 0   • levothyroxine (SYNTHROID) 50 MCG Tab TAKE 1 TABLET BY MOUTH IN THE MORNING ON EMPTY STOMACH 90 Tab 0   • polyethylene glycol/lytes (MIRALAX) Pack DISSOLVE ONE PACK IN LIQUID AND DRINK BY MOUTH EVERY 6 HOURS UNTIL BOWEL MOVEMENT 30 Each 0   • lisinopril (PRINIVIL) 10 MG Tab TAKE 1 TABLET BY MOUTH ONCE DAILY 90 Tab 0   • mupirocin (BACTROBAN) 2 % Ointment Apply 1 Application to affected area(s) every day. 1 Tube 0   • metformin (GLUCOPHAGE) 500 MG Tab TAKE ONE TABLET BY MOUTH TWICE A DAY WITH MEALS 180 Tab 1   • famotidine (PEPCID) 20 MG Tab Take 1 Tab by mouth 2 times a day. 180 Tab 3   • quetiapine (SEROQUEL) 200 MG Tab Take 1 Tab by mouth every day. 90 Tab 3   • triamcinolone acetonide (KENALOG) 0.1 % Cream Apply 1 Application to affected area(s) 2 times a day. 1 Tube 0   • ascorbic acid (VITAMIN C) 500 MG tablet Take 0.5 Tabs by mouth every day. supplement for wound healing 30 Tab 3   • Magnesium Citrate (CITRATE OF MAGNESIA) 1.745 GM/30ML Solution Take 150 mL by mouth every day. Until you have a BM. 195 mL 0   • bisacodyl (DULCOLAX) 10 MG Suppos Insert 1 Suppository in rectum every day. 10 Suppository 0     No current facility-administered medications for this visit.        Social History   Substance Use Topics   • Smoking status: Former Smoker     Packs/day: 1.00     Years: 10.00     Types: Cigarettes     Quit date: 1965   • Smokeless tobacco: Never Used   • Alcohol use No       Family Status   Relation Status   • Fa    • Mo    • Neg Hx (Not Specified)     Family History   Problem Relation Age of Onset   • Heart Disease Father    • Diabetes Neg Hx    • Stroke Neg Hx    • Cancer Neg Hx         stomach       Review of Systems:    Constitutional:  Negative for fever, chills, weight loss and malaise/fatigue.   HEENT:  Negative for ear pain, nosebleeds, congestion, sore throat and neck pain.    Eyes:  Negative for blurred vision.   Respiratory:  Negative for cough, sputum production, shortness of breath and wheezing.    Cardiovascular:  Negative for chest pain, palpitations, orthopnea and leg swelling.   Gastrointestinal:  Negative for heartburn, nausea, vomiting and abdominal pain.   Genitourinary:  Negative for dysuria, urgency and frequency.   Musculoskeletal:  Negative for myalgias, back pain and joint pain.   Skin:  Negative for rash and itching.   Neurological:  Negative for dizziness, tingling, tremors, sensory change, focal weakness and headaches.   Endo/Heme/Allergies:  Does not bruise/bleed easily.   Psychiatric/Behavioral: Positive for depression, negative suicidal ideas and positive memory loss.  The patient is not nervous/anxious and does not have insomnia.    All other systems reviewed and are negative except as in HPI.    Exam:  Blood pressure 124/70, pulse 88, temperature 36.6 °C (97.9 °F), temperature source Temporal, resp. rate 18, SpO2 96 %.  General:  Normal appearing. No distress.  HEENT:  Normocephalic. Eyes conjunctiva clear lids without ptosis, pupils equal and reactive to light accommodation, ears normal shape and contour, canals are clear bilaterally, tympanic membranes are benign, nasal mucosa benign, oropharynx is without erythema, edema or exudates.   Neck:  Supple without JVD or bruit. Thyroid is not enlarged.  Pulmonary:  Clear to ausculation.  Normal effort. No rales, ronchi, or wheezing.  Cardiovascular:  Regular rate and rhythm without murmur. Carotid and radial pulses are intact and equal bilaterally.  Abdomen:  Soft, nontender, nondistended. Normal bowel sounds. Liver and spleen are not palpable  Neurologic:  Grossly nonfocal  Lymph:  No cervical, supraclavicular or axillary lymph nodes are palpable  Skin:   Warm and dry.  No obvious lesions.  Musculoskeletal:  Normal gait. No extremity cyanosis, clubbing, or edema.  Psych:  Normal mood and affect. Alert and oriented x3. Judgment and insight is normal.      PLAN:    1. Recurrent major depressive disorder, in partial remission (HCC)  Cymbalta refill is provided today.  Patient states he is doing well on a lot of medications.  Denies side effects on this medication.  - DULoxetine (CYMBALTA) 60 MG Cap DR Particles delayed-release capsule; Take 1 Cap by mouth every day.  Dispense: 90 Cap; Refill: 3    Follow-up in 6 months or sooner as needed.  Patient is stable at this time. Patient is encouraged to be seen in the emergency room for chest pain, palpitations, shortness of breath, dizziness, severe abdominal pain or other concerning symptoms.      Please note that this dictation was created using voice recognition software. I have made every reasonable attempt to correct obvious errors, but I expect that there are errors of grammar and possibly content that I did not discover before finalizing the note.    Assessment/Plan:  1. Recurrent major depressive disorder, in partial remission (HCC)  DULoxetine (CYMBALTA) 60 MG Cap DR Particles delayed-release capsule   2. Moderate episode of recurrent major depressive disorder (HCC)            I have placed the below orders and discussed them with an approved delegating provider. The MA is performing the below orders under the direction of Dr. Townsend.

## 2018-10-22 DIAGNOSIS — I10 ESSENTIAL HYPERTENSION: ICD-10-CM

## 2018-10-22 RX ORDER — AMLODIPINE BESYLATE 5 MG/1
TABLET ORAL
Qty: 90 TAB | Refills: 0 | Status: SHIPPED | OUTPATIENT
Start: 2018-10-22 | End: 2019-02-05 | Stop reason: SDUPTHER

## 2018-10-26 DIAGNOSIS — I10 ESSENTIAL HYPERTENSION: ICD-10-CM

## 2018-10-29 RX ORDER — LISINOPRIL 10 MG/1
TABLET ORAL
Qty: 90 TAB | Refills: 0 | Status: SHIPPED | OUTPATIENT
Start: 2018-10-29 | End: 2019-01-16 | Stop reason: SDUPTHER

## 2018-11-12 DIAGNOSIS — E03.9 ACQUIRED HYPOTHYROIDISM: ICD-10-CM

## 2018-11-12 RX ORDER — LEVOTHYROXINE SODIUM 0.05 MG/1
TABLET ORAL
Qty: 90 TAB | Refills: 0 | Status: SHIPPED | OUTPATIENT
Start: 2018-11-12 | End: 2019-02-05 | Stop reason: SDUPTHER

## 2018-11-30 DIAGNOSIS — G89.29 CHRONIC MIDLINE LOW BACK PAIN WITHOUT SCIATICA: ICD-10-CM

## 2018-11-30 DIAGNOSIS — F03.91 DEMENTIA WITH BEHAVIORAL DISTURBANCE, UNSPECIFIED DEMENTIA TYPE: ICD-10-CM

## 2018-11-30 DIAGNOSIS — M54.50 CHRONIC MIDLINE LOW BACK PAIN WITHOUT SCIATICA: ICD-10-CM

## 2018-12-03 RX ORDER — QUETIAPINE FUMARATE 200 MG/1
TABLET, FILM COATED ORAL
Qty: 90 TAB | Refills: 0 | Status: SHIPPED | OUTPATIENT
Start: 2018-12-03 | End: 2019-03-04 | Stop reason: SDUPTHER

## 2018-12-03 RX ORDER — DOCUSATE SODIUM 100 MG/1
CAPSULE, LIQUID FILLED ORAL
Qty: 90 CAP | Refills: 0 | Status: SHIPPED | OUTPATIENT
Start: 2018-12-03 | End: 2020-10-26

## 2019-01-16 DIAGNOSIS — M54.50 CHRONIC MIDLINE LOW BACK PAIN WITHOUT SCIATICA: ICD-10-CM

## 2019-01-16 DIAGNOSIS — I10 ESSENTIAL HYPERTENSION: ICD-10-CM

## 2019-01-16 DIAGNOSIS — K59.03 DRUG-INDUCED CONSTIPATION: ICD-10-CM

## 2019-01-16 DIAGNOSIS — G89.29 CHRONIC MIDLINE LOW BACK PAIN WITHOUT SCIATICA: ICD-10-CM

## 2019-01-16 RX ORDER — POLYETHYLENE GLYCOL 3350 17 G/17G
POWDER, FOR SOLUTION ORAL
Qty: 90 EACH | Refills: 0 | Status: SHIPPED | OUTPATIENT
Start: 2019-01-16 | End: 2021-01-01

## 2019-01-16 RX ORDER — DOCUSATE SODIUM 100 MG/1
CAPSULE, LIQUID FILLED ORAL
Qty: 60 CAP | Refills: 0 | Status: SHIPPED | OUTPATIENT
Start: 2019-01-16 | End: 2019-02-14 | Stop reason: SDUPTHER

## 2019-01-16 RX ORDER — LISINOPRIL 10 MG/1
TABLET ORAL
Qty: 90 TAB | Refills: 3 | Status: SHIPPED | OUTPATIENT
Start: 2019-01-16 | End: 2019-04-24 | Stop reason: SDUPTHER

## 2019-02-05 DIAGNOSIS — I10 ESSENTIAL HYPERTENSION: ICD-10-CM

## 2019-02-05 DIAGNOSIS — E03.9 ACQUIRED HYPOTHYROIDISM: ICD-10-CM

## 2019-02-05 RX ORDER — AMLODIPINE BESYLATE 5 MG/1
TABLET ORAL
Qty: 90 TAB | Refills: 0 | Status: SHIPPED | OUTPATIENT
Start: 2019-02-05 | End: 2019-05-01 | Stop reason: SDUPTHER

## 2019-02-05 RX ORDER — LEVOTHYROXINE SODIUM 0.05 MG/1
TABLET ORAL
Qty: 90 TAB | Refills: 0 | Status: SHIPPED | OUTPATIENT
Start: 2019-02-05 | End: 2019-05-09 | Stop reason: SDUPTHER

## 2019-02-05 NOTE — TELEPHONE ENCOUNTER
Phone Number Called: [phone] (home)       Message: Called and spoke to patients wife. She will take him this Saturday. LM     Left Message for patient to call back: no

## 2019-02-09 ENCOUNTER — HOSPITAL ENCOUNTER (OUTPATIENT)
Dept: LAB | Facility: MEDICAL CENTER | Age: 84
End: 2019-02-09
Attending: NURSE PRACTITIONER
Payer: MEDICARE

## 2019-02-09 DIAGNOSIS — E03.9 ACQUIRED HYPOTHYROIDISM: ICD-10-CM

## 2019-02-09 DIAGNOSIS — E87.1 HYPONATREMIA: ICD-10-CM

## 2019-02-09 LAB
ALBUMIN SERPL BCP-MCNC: 3.8 G/DL (ref 3.2–4.9)
ALBUMIN/GLOB SERPL: 0.9 G/DL
ALP SERPL-CCNC: 52 U/L (ref 30–99)
ALT SERPL-CCNC: 19 U/L (ref 2–50)
ANION GAP SERPL CALC-SCNC: 9 MMOL/L (ref 0–11.9)
AST SERPL-CCNC: 27 U/L (ref 12–45)
BILIRUB SERPL-MCNC: 0.2 MG/DL (ref 0.1–1.5)
BUN SERPL-MCNC: 21 MG/DL (ref 8–22)
CALCIUM SERPL-MCNC: 9.3 MG/DL (ref 8.5–10.5)
CHLORIDE SERPL-SCNC: 96 MMOL/L (ref 96–112)
CO2 SERPL-SCNC: 28 MMOL/L (ref 20–33)
CREAT SERPL-MCNC: 1.18 MG/DL (ref 0.5–1.4)
GLOBULIN SER CALC-MCNC: 4.2 G/DL (ref 1.9–3.5)
GLUCOSE SERPL-MCNC: 135 MG/DL (ref 65–99)
POTASSIUM SERPL-SCNC: 4.1 MMOL/L (ref 3.6–5.5)
PROT SERPL-MCNC: 8 G/DL (ref 6–8.2)
SODIUM SERPL-SCNC: 133 MMOL/L (ref 135–145)
TSH SERPL DL<=0.005 MIU/L-ACNC: 5.24 UIU/ML (ref 0.38–5.33)

## 2019-02-09 PROCEDURE — 84443 ASSAY THYROID STIM HORMONE: CPT

## 2019-02-09 PROCEDURE — 36415 COLL VENOUS BLD VENIPUNCTURE: CPT

## 2019-02-09 PROCEDURE — 80053 COMPREHEN METABOLIC PANEL: CPT

## 2019-02-12 ENCOUNTER — TELEPHONE (OUTPATIENT)
Dept: MEDICAL GROUP | Facility: PHYSICIAN GROUP | Age: 84
End: 2019-02-12

## 2019-02-12 NOTE — TELEPHONE ENCOUNTER
Phone Number Called: [phone] (home)       Message: Called and spoke to wife. She had no questions at this time. LM     Left Message for patient to call back: no

## 2019-02-12 NOTE — TELEPHONE ENCOUNTER
----- Message from GARCIA Otero sent at 2/11/2019  8:01 PM PST -----  Please call pt and give lab results: Labs are stable, sodium is significantly improved I do notice that glucose was higher on this blood draw.  TSH is stable.

## 2019-02-14 DIAGNOSIS — G89.29 CHRONIC MIDLINE LOW BACK PAIN WITHOUT SCIATICA: ICD-10-CM

## 2019-02-14 DIAGNOSIS — M54.50 CHRONIC MIDLINE LOW BACK PAIN WITHOUT SCIATICA: ICD-10-CM

## 2019-02-14 RX ORDER — DOCUSATE SODIUM 100 MG/1
CAPSULE, LIQUID FILLED ORAL
Qty: 180 CAP | Refills: 3 | Status: SHIPPED | OUTPATIENT
Start: 2019-02-14 | End: 2019-09-30 | Stop reason: SDUPTHER

## 2019-03-04 DIAGNOSIS — F03.91 DEMENTIA WITH BEHAVIORAL DISTURBANCE, UNSPECIFIED DEMENTIA TYPE: ICD-10-CM

## 2019-03-04 RX ORDER — QUETIAPINE FUMARATE 200 MG/1
TABLET, FILM COATED ORAL
Qty: 90 TAB | Refills: 0 | Status: SHIPPED | OUTPATIENT
Start: 2019-03-04 | End: 2019-05-29 | Stop reason: SDUPTHER

## 2019-04-16 ENCOUNTER — TELEPHONE (OUTPATIENT)
Dept: MEDICAL GROUP | Facility: PHYSICIAN GROUP | Age: 84
End: 2019-04-16

## 2019-04-16 NOTE — TELEPHONE ENCOUNTER
Phone Number Called: [phone] (home)       Message: Called and spoke with wife Ailyn. She said patient is taking medication and has refills. Lm     Left Message for patient to call back: no

## 2019-04-24 DIAGNOSIS — I10 ESSENTIAL HYPERTENSION: ICD-10-CM

## 2019-04-24 RX ORDER — LISINOPRIL 10 MG/1
TABLET ORAL
Qty: 100 TAB | Refills: 0 | Status: SHIPPED | OUTPATIENT
Start: 2019-04-24 | End: 2019-09-30 | Stop reason: SDUPTHER

## 2019-05-01 DIAGNOSIS — I10 ESSENTIAL HYPERTENSION: ICD-10-CM

## 2019-05-01 RX ORDER — AMLODIPINE BESYLATE 5 MG/1
TABLET ORAL
Qty: 90 TAB | Refills: 0 | Status: SHIPPED | OUTPATIENT
Start: 2019-05-01 | End: 2019-07-30 | Stop reason: SDUPTHER

## 2019-05-09 DIAGNOSIS — E03.9 ACQUIRED HYPOTHYROIDISM: ICD-10-CM

## 2019-05-09 RX ORDER — LEVOTHYROXINE SODIUM 0.05 MG/1
TABLET ORAL
Qty: 90 TAB | Refills: 0 | Status: SHIPPED | OUTPATIENT
Start: 2019-05-09 | End: 2019-08-05 | Stop reason: SDUPTHER

## 2019-05-29 DIAGNOSIS — F03.91 DEMENTIA WITH BEHAVIORAL DISTURBANCE, UNSPECIFIED DEMENTIA TYPE: ICD-10-CM

## 2019-05-29 RX ORDER — QUETIAPINE FUMARATE 200 MG/1
TABLET, FILM COATED ORAL
Qty: 90 TAB | Refills: 0 | Status: SHIPPED | OUTPATIENT
Start: 2019-05-29 | End: 2019-08-28 | Stop reason: SDUPTHER

## 2019-07-22 NOTE — TELEPHONE ENCOUNTER
Was the patient seen in the last year in this department? Yes    Does patient have an active prescription for medications requested? No     Received Request Via: Pharmacy     200 qty per insurance.

## 2019-07-30 DIAGNOSIS — I10 ESSENTIAL HYPERTENSION: ICD-10-CM

## 2019-07-31 RX ORDER — AMLODIPINE BESYLATE 5 MG/1
TABLET ORAL
Qty: 90 TAB | Refills: 0 | Status: SHIPPED | OUTPATIENT
Start: 2019-07-31 | End: 2019-09-30 | Stop reason: SDUPTHER

## 2019-08-05 DIAGNOSIS — E03.9 ACQUIRED HYPOTHYROIDISM: ICD-10-CM

## 2019-08-05 RX ORDER — LEVOTHYROXINE SODIUM 0.05 MG/1
TABLET ORAL
Qty: 90 TAB | Refills: 0 | Status: SHIPPED | OUTPATIENT
Start: 2019-08-05 | End: 2019-09-30 | Stop reason: SDUPTHER

## 2019-08-28 DIAGNOSIS — F03.91 DEMENTIA WITH BEHAVIORAL DISTURBANCE, UNSPECIFIED DEMENTIA TYPE: ICD-10-CM

## 2019-08-28 RX ORDER — QUETIAPINE FUMARATE 200 MG/1
TABLET, FILM COATED ORAL
Qty: 30 TAB | Refills: 0 | Status: SHIPPED | OUTPATIENT
Start: 2019-08-28 | End: 2019-10-03 | Stop reason: SDUPTHER

## 2019-09-24 DIAGNOSIS — F33.41 RECURRENT MAJOR DEPRESSIVE DISORDER, IN PARTIAL REMISSION (HCC): ICD-10-CM

## 2019-09-25 RX ORDER — DULOXETIN HYDROCHLORIDE 60 MG/1
CAPSULE, DELAYED RELEASE ORAL
Qty: 90 CAP | Refills: 0 | OUTPATIENT
Start: 2019-09-25

## 2019-09-26 DIAGNOSIS — F33.41 RECURRENT MAJOR DEPRESSIVE DISORDER, IN PARTIAL REMISSION (HCC): ICD-10-CM

## 2019-09-26 RX ORDER — DULOXETIN HYDROCHLORIDE 60 MG/1
CAPSULE, DELAYED RELEASE ORAL
Qty: 90 CAP | Refills: 0 | OUTPATIENT
Start: 2019-09-26

## 2019-09-26 NOTE — TELEPHONE ENCOUNTER
Phone Number Called: [phone] (home)     Call outcome: left message for patient to call back regarding message below    Message: LVM for patient to make appt for med refill

## 2019-09-27 NOTE — TELEPHONE ENCOUNTER
Patient's wife called stating patient will be out of medication and scheduling told them they had no appt with Jesus until 10/21.  Appt r/s to 9/30 @ 3 PM with PCP.

## 2019-09-30 ENCOUNTER — OFFICE VISIT (OUTPATIENT)
Dept: MEDICAL GROUP | Facility: PHYSICIAN GROUP | Age: 84
End: 2019-09-30
Payer: MEDICARE

## 2019-09-30 ENCOUNTER — HOSPITAL ENCOUNTER (OUTPATIENT)
Dept: LAB | Facility: MEDICAL CENTER | Age: 84
End: 2019-09-30
Attending: NURSE PRACTITIONER
Payer: MEDICARE

## 2019-09-30 VITALS
RESPIRATION RATE: 12 BRPM | OXYGEN SATURATION: 99 % | DIASTOLIC BLOOD PRESSURE: 70 MMHG | SYSTOLIC BLOOD PRESSURE: 122 MMHG | HEART RATE: 79 BPM | BODY MASS INDEX: 13.95 KG/M2 | HEIGHT: 71 IN | TEMPERATURE: 98.1 F

## 2019-09-30 DIAGNOSIS — E03.9 ACQUIRED HYPOTHYROIDISM: ICD-10-CM

## 2019-09-30 DIAGNOSIS — M54.50 CHRONIC MIDLINE LOW BACK PAIN WITHOUT SCIATICA: ICD-10-CM

## 2019-09-30 DIAGNOSIS — F33.41 RECURRENT MAJOR DEPRESSIVE DISORDER, IN PARTIAL REMISSION (HCC): ICD-10-CM

## 2019-09-30 DIAGNOSIS — I10 ESSENTIAL HYPERTENSION: ICD-10-CM

## 2019-09-30 DIAGNOSIS — G89.29 CHRONIC MIDLINE LOW BACK PAIN WITHOUT SCIATICA: ICD-10-CM

## 2019-09-30 DIAGNOSIS — E11.9 CONTROLLED TYPE 2 DIABETES MELLITUS WITHOUT COMPLICATION, WITHOUT LONG-TERM CURRENT USE OF INSULIN (HCC): ICD-10-CM

## 2019-09-30 LAB
ALBUMIN SERPL BCP-MCNC: 4 G/DL (ref 3.2–4.9)
ALBUMIN/GLOB SERPL: 1.1 G/DL
ALP SERPL-CCNC: 61 U/L (ref 30–99)
ALT SERPL-CCNC: 16 U/L (ref 2–50)
ANION GAP SERPL CALC-SCNC: 7 MMOL/L (ref 0–11.9)
AST SERPL-CCNC: 22 U/L (ref 12–45)
BASOPHILS # BLD AUTO: 1.6 % (ref 0–1.8)
BASOPHILS # BLD: 0.09 K/UL (ref 0–0.12)
BILIRUB SERPL-MCNC: 0.3 MG/DL (ref 0.1–1.5)
BUN SERPL-MCNC: 27 MG/DL (ref 8–22)
CALCIUM SERPL-MCNC: 9.4 MG/DL (ref 8.5–10.5)
CHLORIDE SERPL-SCNC: 99 MMOL/L (ref 96–112)
CHOLEST SERPL-MCNC: 151 MG/DL (ref 100–199)
CO2 SERPL-SCNC: 25 MMOL/L (ref 20–33)
CREAT SERPL-MCNC: 1.14 MG/DL (ref 0.5–1.4)
EOSINOPHIL # BLD AUTO: 0.68 K/UL (ref 0–0.51)
EOSINOPHIL NFR BLD: 12.2 % (ref 0–6.9)
ERYTHROCYTE [DISTWIDTH] IN BLOOD BY AUTOMATED COUNT: 46.7 FL (ref 35.9–50)
FASTING STATUS PATIENT QL REPORTED: NORMAL
GLOBULIN SER CALC-MCNC: 3.7 G/DL (ref 1.9–3.5)
GLUCOSE SERPL-MCNC: 122 MG/DL (ref 65–99)
HCT VFR BLD AUTO: 27.2 % (ref 42–52)
HDLC SERPL-MCNC: 55 MG/DL
HGB BLD-MCNC: 8.6 G/DL (ref 14–18)
IMM GRANULOCYTES # BLD AUTO: 0.17 K/UL (ref 0–0.11)
IMM GRANULOCYTES NFR BLD AUTO: 3 % (ref 0–0.9)
LDLC SERPL CALC-MCNC: 78 MG/DL
LYMPHOCYTES # BLD AUTO: 1.33 K/UL (ref 1–4.8)
LYMPHOCYTES NFR BLD: 23.8 % (ref 22–41)
MCH RBC QN AUTO: 30.9 PG (ref 27–33)
MCHC RBC AUTO-ENTMCNC: 31.6 G/DL (ref 33.7–35.3)
MCV RBC AUTO: 97.8 FL (ref 81.4–97.8)
MONOCYTES # BLD AUTO: 0.69 K/UL (ref 0–0.85)
MONOCYTES NFR BLD AUTO: 12.3 % (ref 0–13.4)
NEUTROPHILS # BLD AUTO: 2.63 K/UL (ref 1.82–7.42)
NEUTROPHILS NFR BLD: 47.1 % (ref 44–72)
NRBC # BLD AUTO: 0 K/UL
NRBC BLD-RTO: 0 /100 WBC
PLATELET # BLD AUTO: 374 K/UL (ref 164–446)
PMV BLD AUTO: 8.6 FL (ref 9–12.9)
POTASSIUM SERPL-SCNC: 4.8 MMOL/L (ref 3.6–5.5)
PROT SERPL-MCNC: 7.7 G/DL (ref 6–8.2)
RBC # BLD AUTO: 2.78 M/UL (ref 4.7–6.1)
SODIUM SERPL-SCNC: 131 MMOL/L (ref 135–145)
TRIGL SERPL-MCNC: 91 MG/DL (ref 0–149)
TSH SERPL DL<=0.005 MIU/L-ACNC: 3 UIU/ML (ref 0.38–5.33)
WBC # BLD AUTO: 5.6 K/UL (ref 4.8–10.8)

## 2019-09-30 PROCEDURE — 84443 ASSAY THYROID STIM HORMONE: CPT

## 2019-09-30 PROCEDURE — 99214 OFFICE O/P EST MOD 30 MIN: CPT | Performed by: NURSE PRACTITIONER

## 2019-09-30 PROCEDURE — 36415 COLL VENOUS BLD VENIPUNCTURE: CPT

## 2019-09-30 PROCEDURE — 80053 COMPREHEN METABOLIC PANEL: CPT

## 2019-09-30 PROCEDURE — 80061 LIPID PANEL: CPT

## 2019-09-30 PROCEDURE — 85025 COMPLETE CBC W/AUTO DIFF WBC: CPT

## 2019-09-30 PROCEDURE — 83036 HEMOGLOBIN GLYCOSYLATED A1C: CPT

## 2019-09-30 RX ORDER — AMLODIPINE BESYLATE 5 MG/1
5 TABLET ORAL DAILY
Qty: 90 TAB | Refills: 3 | Status: SHIPPED | OUTPATIENT
Start: 2019-09-30 | End: 2020-10-26

## 2019-09-30 RX ORDER — LISINOPRIL 10 MG/1
TABLET ORAL
Qty: 100 TAB | Refills: 3 | Status: SHIPPED | OUTPATIENT
Start: 2019-09-30 | End: 2020-12-10

## 2019-09-30 RX ORDER — DOCUSATE SODIUM 100 MG/1
100 CAPSULE, LIQUID FILLED ORAL 2 TIMES DAILY
Qty: 180 CAP | Refills: 3 | Status: SHIPPED | OUTPATIENT
Start: 2019-09-30 | End: 2020-10-26

## 2019-09-30 RX ORDER — LEVOTHYROXINE SODIUM 0.05 MG/1
50 TABLET ORAL
Qty: 90 TAB | Refills: 0 | Status: SHIPPED | OUTPATIENT
Start: 2019-09-30 | End: 2020-02-05

## 2019-09-30 RX ORDER — DULOXETIN HYDROCHLORIDE 60 MG/1
60 CAPSULE, DELAYED RELEASE ORAL DAILY
Qty: 90 CAP | Refills: 3 | Status: SHIPPED | OUTPATIENT
Start: 2019-09-30 | End: 2020-09-21

## 2019-10-01 LAB
EST. AVERAGE GLUCOSE BLD GHB EST-MCNC: 111 MG/DL
HBA1C MFR BLD: 5.5 % (ref 0–5.6)

## 2019-10-01 NOTE — ASSESSMENT & PLAN NOTE
Chronic in nature. Stable. Taking medication as prescribed. Denies skin changes, temperature intolerance or changes in bowel habits.

## 2019-10-01 NOTE — ASSESSMENT & PLAN NOTE
Chronic in nature. Patient denies symptoms of high or low sugars. Takes his medications as prescribed.

## 2019-10-01 NOTE — PROGRESS NOTES
"Chief Complaint   Patient presents with   • Medication Refill     All        HISTORY OF PRESENT ILLNESS: Patient is a 85 y.o. male established patient who presents today to discuss chronic issues.    Major depressive disorder  Chronic in nature. Stable. Patient jokes during the exam that he is always \"just terrible.\" Patient denies suicidal or homicidal ideation.    Hypothyroidism  Chronic in nature. Stable. Taking medication as prescribed. Denies skin changes, temperature intolerance or changes in bowel habits.    Hypertension  Chronic in nature. Stable. Blood pressure today is 122/70. Patient takes medication as prescribed. Patient denies chest pain, palpitations, dizziness, headache, shortness of breath.    Controlled type 2 diabetes mellitus without complication, without long-term current use of insulin (CMS-HCC)  Chronic in nature. Patient denies symptoms of high or low sugars. Takes his medications as prescribed.    Chronic midline low back pain without sciatica  Chronic in nature. Stable. Patient is currently taking OTC Tylenol as needed for pain.      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) 05/11/2017   • Chronic midline low back pain without sciatica 02/24/2017   • Protein calorie malnutrition (HCC) 12/01/2016   • Controlled type 2 diabetes mellitus without complication, without long-term current use of insulin ([facility]) 12/01/2016   • Gait disorder 05/11/2016   • Insomnia 04/06/2016   • Squamous cell cancer of tongue (CMS-HCC) " 2012   • GERD (gastroesophageal reflux disease) 2010   • Anxiety 2010   • Chronic idiopathic gout involving toe of left foot without tophus 06/15/2009       Allergies:Nkda [no known drug allergy]    Current Outpatient Medications   Medication Sig Dispense Refill   • levothyroxine (SYNTHROID) 50 MCG Tab Take 1 Tab by mouth Every morning on an empty stomach. 90 Tab 0   • amLODIPine (NORVASC) 5 MG Tab Take 1 Tab by mouth every day. 90 Tab 3   • metFORMIN (GLUCOPHAGE) 500 MG Tab TAKE 1 TABLET BY MOUTH TWICE DAILY WITH MEALS 200 Tab 3   • lisinopril (PRINIVIL) 10 MG Tab TAKE 1 TABLET BY MOUTH ONCE DAILY 100 Tab 3   • docusate sodium (EQUATE STOOL SOFTENER) 100 MG Cap Take 1 Cap by mouth 2 times a day. 180 Cap 3   • DULoxetine (CYMBALTA) 60 MG Cap DR Particles delayed-release capsule Take 1 Cap by mouth every day. 90 Cap 3   • QUEtiapine (SEROQUEL) 200 MG Tab TAKE 1 TABLET BY MOUTH ONCE DAILY 30 Tab 0   • polyethylene glycol/lytes (MIRALAX) Pack DISSOLVE 1 POWDER PACK IN WATER & DRINK EVERY 6 HOURS UNTIL BOWEL MOVEMENT 90 Each 0   • mupirocin (BACTROBAN) 2 % Ointment Apply 1 Application to affected area(s) every day. 1 Tube 0   • triamcinolone acetonide (KENALOG) 0.1 % Cream Apply 1 Application to affected area(s) 2 times a day. 1 Tube 0   • ascorbic acid (VITAMIN C) 500 MG tablet Take 0.5 Tabs by mouth every day. supplement for wound healing 30 Tab 3   • EQUATE STOOL SOFTENER 100 MG Cap TAKE 1 CAPSULE BY MOUTH TWICE DAILY (Patient not taking: Reported on 2019) 90 Cap 0   • bisacodyl (DULCOLAX) 10 MG Suppos Insert 1 Suppository in rectum every day. (Patient not taking: Reported on 2019) 10 Suppository 0     No current facility-administered medications for this visit.        Social History     Tobacco Use   • Smoking status: Former Smoker     Packs/day: 1.00     Years: 10.00     Pack years: 10.00     Types: Cigarettes     Last attempt to quit: 1965     Years since quittin.7   •  "Smokeless tobacco: Never Used   Substance Use Topics   • Alcohol use: No   • Drug use: No       Family Status   Relation Name Status   • Fa     • Mo     • Neg Hx  (Not Specified)     Family History   Problem Relation Age of Onset   • Heart Disease Father    • Diabetes Neg Hx    • Stroke Neg Hx    • Cancer Neg Hx         stomach       Review of Systems:   Constitutional: Negative for fever, chills, weight loss and malaise/fatigue.   HEENT: Negative for ear pain, nosebleeds, congestion, sore throat and neck pain.    Eyes: Negative for blurred vision.   Respiratory: Negative for cough, sputum production, shortness of breath and wheezing.    Cardiovascular: Negative for chest pain, palpitations, orthopnea and leg swelling.   Gastrointestinal: Negative for heartburn, nausea, vomiting and abdominal pain.   Genitourinary: Negative for dysuria, urgency and frequency.   Musculoskeletal: Negative for myalgias, back pain and joint pain.   Skin: Negative for rash and itching.   Neurological: Negative for dizziness, tingling, tremors, sensory change, focal weakness and headaches.   Endo/Heme/Allergies: Does not bruise/bleed easily.   Psychiatric/Behavioral: Negative for depression, suicidal ideas and memory loss.  The patient is not nervous/anxious and does not have insomnia.    All other systems reviewed and are negative except as in HPI.    Exam:  /70 (BP Location: Left arm, Patient Position: Sitting, BP Cuff Size: Adult)   Pulse 79   Temp 36.7 °C (98.1 °F) (Temporal)   Resp 12   Ht 1.803 m (5' 11\")   SpO2 99%   General: Normal appearing. No distress.  HEENT: Normocephalic. Eyes conjunctiva clear lids without ptosis, pupils equal and reactive to light accommodation, ears normal shape and contour, canals are clear bilaterally, tympanic membranes are benign, nasal mucosa benign, oropharynx is without erythema, edema or exudates.   Neck: Supple without JVD or bruit. Thyroid is not enlarged.  Pulmonary: " Clear to ausculation.  Normal effort. No rales, ronchi, or wheezing.  Cardiovascular: Regular rate and rhythm without murmur. Carotid and radial pulses are intact and equal bilaterally.  Abdomen: Soft, nontender, nondistended. Normal bowel sounds. Liver and spleen are not palpable  Neurologic: Grossly nonfocal  Skin: Warm and dry.  No obvious lesions.  Musculoskeletal: Wheelchair bound. No extremity cyanosis, clubbing, or edema.  Psych: Normal mood and affect. Alert and oriented x3. Judgment and insight is normal.      PLAN:    1. Acquired hypothyroidism  Chronic. Continue medications as prescribed. Will check TSH levels.  - TSH WITH REFLEX TO FT4; Future  - levothyroxine (SYNTHROID) 50 MCG Tab; Take 1 Tab by mouth Every morning on an empty stomach.  Dispense: 90 Tab; Refill: 0    2. Essential hypertension  Chronic. Continue medications as prescribed.  - CBC WITH DIFFERENTIAL; Future  - Comp Metabolic Panel; Future  - Lipid Profile; Future  - amLODIPine (NORVASC) 5 MG Tab; Take 1 Tab by mouth every day.  Dispense: 90 Tab; Refill: 3  - lisinopril (PRINIVIL) 10 MG Tab; TAKE 1 TABLET BY MOUTH ONCE DAILY  Dispense: 100 Tab; Refill: 3    3. Chronic midline low back pain without sciatica  Chronic. Continue OTC medications as needed.  - docusate sodium (EQUATE STOOL SOFTENER) 100 MG Cap; Take 1 Cap by mouth 2 times a day.  Dispense: 180 Cap; Refill: 3    4. Recurrent major depressive disorder, in partial remission (HCC)  Chronic. Educated to continue medications. Check blood pressure and notify provider if any changes.  - DULoxetine (CYMBALTA) 60 MG Cap DR Particles delayed-release capsule; Take 1 Cap by mouth every day.  Dispense: 90 Cap; Refill: 3    5. Controlled type 2 diabetes mellitus without complication, without long-term current use of insulin (HCC)  Chronic. Educated to continue medications. Will check A1C. Educated to encourage protein shakes that are carb controlled.  - HEMOGLOBIN A1C; Future  - metFORMIN  (GLUCOPHAGE) 500 MG Tab; TAKE 1 TABLET BY MOUTH TWICE DAILY WITH MEALS  Dispense: 200 Tab; Refill: 3    Follow up in 6 months. Patient is encouraged to be seen in the emergency room for chest pain, palpitations, shortness of breath, dizziness, severe abdominal pain or other concerning symptoms.      Please note that this dictation was created using voice recognition software. I have made every reasonable attempt to correct obvious errors, but I expect that there are errors of grammar and possibly content that I did not discover before finalizing the note.    Assessment/Plan:  1. Acquired hypothyroidism  TSH WITH REFLEX TO FT4    levothyroxine (SYNTHROID) 50 MCG Tab   2. Essential hypertension  CBC WITH DIFFERENTIAL    Comp Metabolic Panel    Lipid Profile    amLODIPine (NORVASC) 5 MG Tab    lisinopril (PRINIVIL) 10 MG Tab   3. Chronic midline low back pain without sciatica  docusate sodium (EQUATE STOOL SOFTENER) 100 MG Cap   4. Recurrent major depressive disorder, in partial remission (HCC)  DULoxetine (CYMBALTA) 60 MG Cap DR Particles delayed-release capsule   5. Controlled type 2 diabetes mellitus without complication, without long-term current use of insulin (HCC)  HEMOGLOBIN A1C    metFORMIN (GLUCOPHAGE) 500 MG Tab          I have placed the below orders and discussed them with an approved delegating provider. The MA is performing the below orders under the direction of Dr. Townsend.

## 2019-10-01 NOTE — ASSESSMENT & PLAN NOTE
Chronic in nature. Stable. Blood pressure today is 122/70. Patient takes medication as prescribed. Patient denies chest pain, palpitations, dizziness, headache, shortness of breath.

## 2019-10-01 NOTE — ASSESSMENT & PLAN NOTE
"Chronic in nature. Stable. Patient jokes during the exam that he is always \"just terrible.\" Patient denies suicidal or homicidal ideation.  "

## 2019-10-02 ENCOUNTER — TELEPHONE (OUTPATIENT)
Dept: MEDICAL GROUP | Facility: PHYSICIAN GROUP | Age: 84
End: 2019-10-02

## 2019-10-02 NOTE — TELEPHONE ENCOUNTER
----- Message from GARCIA Otero sent at 10/2/2019  1:07 PM PDT -----  Please call pt and give lab results: Overall labs are stable. I do note iron deficiency anemia is slightly worse. This may be diet related. Please schedule follow-up to discuss.

## 2019-10-03 DIAGNOSIS — F03.91 DEMENTIA WITH BEHAVIORAL DISTURBANCE, UNSPECIFIED DEMENTIA TYPE: ICD-10-CM

## 2019-10-04 RX ORDER — QUETIAPINE FUMARATE 200 MG/1
TABLET, FILM COATED ORAL
Qty: 30 TAB | Refills: 0 | Status: SHIPPED | OUTPATIENT
Start: 2019-10-04 | End: 2019-10-28 | Stop reason: SDUPTHER

## 2019-10-28 ENCOUNTER — OFFICE VISIT (OUTPATIENT)
Dept: MEDICAL GROUP | Facility: PHYSICIAN GROUP | Age: 84
End: 2019-10-28
Payer: MEDICARE

## 2019-10-28 VITALS
HEART RATE: 86 BPM | TEMPERATURE: 97.9 F | BODY MASS INDEX: 13.95 KG/M2 | DIASTOLIC BLOOD PRESSURE: 68 MMHG | RESPIRATION RATE: 16 BRPM | SYSTOLIC BLOOD PRESSURE: 114 MMHG | HEIGHT: 71 IN | OXYGEN SATURATION: 95 %

## 2019-10-28 DIAGNOSIS — F03.91 DEMENTIA WITH BEHAVIORAL DISTURBANCE, UNSPECIFIED DEMENTIA TYPE: ICD-10-CM

## 2019-10-28 DIAGNOSIS — E11.9 CONTROLLED TYPE 2 DIABETES MELLITUS WITHOUT COMPLICATION, WITHOUT LONG-TERM CURRENT USE OF INSULIN (HCC): ICD-10-CM

## 2019-10-28 DIAGNOSIS — E44.0 MODERATE PROTEIN-CALORIE MALNUTRITION (HCC): ICD-10-CM

## 2019-10-28 DIAGNOSIS — Z91.81 RISK FOR FALLS: ICD-10-CM

## 2019-10-28 DIAGNOSIS — D50.8 IRON DEFICIENCY ANEMIA SECONDARY TO INADEQUATE DIETARY IRON INTAKE: ICD-10-CM

## 2019-10-28 DIAGNOSIS — F33.41 RECURRENT MAJOR DEPRESSIVE DISORDER, IN PARTIAL REMISSION (HCC): ICD-10-CM

## 2019-10-28 PROCEDURE — 99214 OFFICE O/P EST MOD 30 MIN: CPT | Performed by: NURSE PRACTITIONER

## 2019-10-28 RX ORDER — QUETIAPINE FUMARATE 200 MG/1
200 TABLET, FILM COATED ORAL DAILY
Qty: 90 TAB | Refills: 3 | Status: SHIPPED | OUTPATIENT
Start: 2019-10-28 | End: 2020-12-15 | Stop reason: SDUPTHER

## 2019-10-29 NOTE — ASSESSMENT & PLAN NOTE
Chronic in nature.  Stable.  Blood sugars have been running 100-120. Currently on metformin. Reports compliance with medications.  Does not check blood sugar, denies fasting hypoglycemia episodes.  Daytime hypoglycemic episodes denies.  Wife reports he can feel the low BG symptoms well.  Some numbness or tingling in feet.  Is not eating enough but following consistent carb diet or counting carbs.

## 2019-10-29 NOTE — ASSESSMENT & PLAN NOTE
"Chronic in nature. Stable but continues to trend down. Patient and wife were educated on ensuring the patient receives Iron rich food in his diet. Encouraged use of Ensure to make sure he is receiving all the nutrients he needs. Patient and wife deny any bright blood in stool or dark colored stool. Patient states he does feel \"blah\" and it might be because he does not have as much energy as he use to.  "

## 2019-10-29 NOTE — PROGRESS NOTES
"Chief Complaint   Patient presents with   • Results     Labs    • Medication Refill       HISTORY OF PRESENT ILLNESS: Patient is a 85 y.o. male established patient who presents today to discuss anemia.    Protein calorie malnutrition (CMS-HCC)  Chronic in nature. Stable. Discussed with patient and wife of implementing a high-calorie Ensure to help increase calorie intake. Patient denies nausea and abdominal pain.    Dementia with behavioral disturbance  Chronic in nature. Stable. Patient is doing well with Seroquel 200 mg at night.    Risk for falls  Chronic in nature. Continues to have difficulty with ambulation and is at a high risk for fall. Patient refuses to work with physical therapy at this time.    Major depressive disorder  Chronic in nature. Stable. Patient states he sees commercials on TV for different medications for depression and wonders if he needs to switch what he is taking. Patient states he just feels \"blah\" but that's the norm. Patient denies suicidal or homicidal ideation.    Iron deficiency anemia secondary to inadequate dietary iron intake  Chronic in nature. Stable but continues to trend down. Patient and wife were educated on ensuring the patient receives Iron rich food in his diet. Encouraged use of Ensure to make sure he is receiving all the nutrients he needs. Patient and wife deny any bright blood in stool or dark colored stool. Patient states he does feel \"blah\" and it might be because he does not have as much energy as he use to.    Controlled type 2 diabetes mellitus without complication, without long-term current use of insulin (CMS-HCC)  Chronic in nature.  Stable.  Blood sugars have been running 100-120. Currently on metformin. Reports compliance with medications.  Does not check blood sugar, denies fasting hypoglycemia episodes.  Daytime hypoglycemic episodes denies.  Wife reports he can feel the low BG symptoms well.  Some numbness or tingling in feet.  Is not eating enough but " following consistent carb diet or counting carbs.       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   • Acute URI 02/05/2018   • Moderate episode of recurrent major depressive disorder ([facility]) 02/05/2018   • Risk for falls 11/27/2017   • Major depressive disorder 11/27/2017   • Eczema 11/27/2017   • Dementia with behavioral disturbance ([facility]) 08/25/2017   • Anemia, blood loss 05/12/2017   • Iron deficiency 05/12/2017   • Femur fracture, left ([facility]) 05/11/2017   • Chronic midline low back pain without sciatica 02/24/2017   • Protein calorie malnutrition ([facility]) 12/01/2016   • Controlled type 2 diabetes mellitus without complication, without long-term current use of insulin ([facility]) 12/01/2016   • Gait disorder 05/11/2016   • Insomnia 04/06/2016   • Squamous cell cancer of tongue (CMS-[facility]) 11/08/2012   • GERD (gastroesophageal reflux disease) 11/08/2010   • Anxiety 01/25/2010   • Chronic idiopathic gout involving toe of left foot without tophus 06/15/2009       Allergies:Nkda [no known drug allergy]    Current Outpatient Medications   Medication Sig Dispense Refill   • QUEtiapine (SEROQUEL) 200 MG Tab Take 1 Tab by mouth every day. 90 Tab 3   • levothyroxine (SYNTHROID) 50 MCG Tab Take 1 Tab by mouth Every morning on an empty stomach. 90 Tab 0   • amLODIPine (NORVASC) 5 MG Tab Take 1 Tab by mouth every day. 90 Tab 3   • metFORMIN (GLUCOPHAGE) 500 MG Tab TAKE 1 TABLET BY MOUTH TWICE DAILY WITH MEALS 200 Tab 3   • lisinopril (PRINIVIL) 10 MG Tab TAKE 1 TABLET BY MOUTH ONCE DAILY 100 Tab 3   • docusate sodium (EQUATE STOOL SOFTENER) 100 MG Cap Take 1 Cap by mouth 2 times a day. 180 Cap 3   • DULoxetine (CYMBALTA) 60 MG Cap DR Particles delayed-release capsule Take 1 Cap by mouth every day. 90 Cap 3   •  polyethylene glycol/lytes (MIRALAX) Pack DISSOLVE 1 POWDER PACK IN WATER & DRINK EVERY 6 HOURS UNTIL BOWEL MOVEMENT 90 Each 0   • EQUATE STOOL SOFTENER 100 MG Cap TAKE 1 CAPSULE BY MOUTH TWICE DAILY (Patient not taking: Reported on 2019) 90 Cap 0   • mupirocin (BACTROBAN) 2 % Ointment Apply 1 Application to affected area(s) every day. 1 Tube 0   • triamcinolone acetonide (KENALOG) 0.1 % Cream Apply 1 Application to affected area(s) 2 times a day. 1 Tube 0   • ascorbic acid (VITAMIN C) 500 MG tablet Take 0.5 Tabs by mouth every day. supplement for wound healing 30 Tab 3   • bisacodyl (DULCOLAX) 10 MG Suppos Insert 1 Suppository in rectum every day. (Patient not taking: Reported on 2019) 10 Suppository 0     No current facility-administered medications for this visit.        Social History     Tobacco Use   • Smoking status: Former Smoker     Packs/day: 1.00     Years: 10.00     Pack years: 10.00     Types: Cigarettes     Last attempt to quit: 1965     Years since quittin.8   • Smokeless tobacco: Never Used   Substance Use Topics   • Alcohol use: No   • Drug use: No       Family Status   Relation Name Status   • Fa     • Mo     • Neg Hx  (Not Specified)     Family History   Problem Relation Age of Onset   • Heart Disease Father    • Diabetes Neg Hx    • Stroke Neg Hx    • Cancer Neg Hx         stomach       Review of Systems:   Constitutional:  Negative for fever, chills, weight loss and malaise/fatigue.   HEENT:  Negative for ear pain, nosebleeds, congestion, sore throat and neck pain.    Eyes:  Negative for blurred vision.   Respiratory:  Negative for cough, sputum production, shortness of breath and wheezing.    Cardiovascular:  Negative for chest pain, palpitations, orthopnea and leg swelling.   Gastrointestinal:  Negative for heartburn, nausea, vomiting and abdominal pain.   Genitourinary:  Negative for dysuria, urgency and frequency.   Musculoskeletal:  Negative for myalgias,  "back pain and joint pain.   Skin:  Negative for rash and itching.   Psychiatric/Behavioral:  Negative for depression, suicidal ideas and memory loss.  The patient is not nervous/anxious and does not have insomnia.    All other systems reviewed and are negative except as in HPI.    Exam:  /68 (BP Location: Left arm, Patient Position: Sitting, BP Cuff Size: Adult)   Pulse 86   Temp 36.6 °C (97.9 °F) (Temporal)   Resp 16   Ht 1.803 m (5' 11\")   SpO2 95%   General:  Normal appearing. No distress.  HEENT:  Normocephalic. Eyes conjunctiva clear lids without ptosis, pupils equal and reactive to light accommodation, ears normal shape and contour, canals are clear bilaterally, tympanic membranes are benign, nasal mucosa benign, oropharynx is without erythema, edema or exudates.   Pulmonary:  Clear to ausculation.  Normal effort. No rales, ronchi, or wheezing.  Cardiovascular:  Regular rate and rhythm without murmur. Carotid and radial pulses are intact and equal bilaterally.  Abdomen:  Soft, nontender, nondistended. Normal bowel sounds. Liver and spleen are not palpable  Neurologic:  Grossly nonfocal  Skin:  Warm and dry.  No obvious lesions.  Musculoskeletal:  Normal gait. No extremity cyanosis, clubbing, or edema.  Psych:  Normal mood and affect. Alert and oriented x3. Judgment and insight is normal.      PLAN:    1. Risk for falls  Discussed the importance of being safe to prevent any harm to patient.    2. Moderate protein-calorie malnutrition (HCC)  3. Iron deficiency anemia secondary to inadequate dietary iron intake  Educated the patient and wife on the importance of receiving adequate nutrition and this may include having to implement supplements, Ensure. Will check labs.  - IRON/TOTAL IRON BIND; Future  - VITAMIN B12; Future  - FOLATE; Future    4. Dementia with behavioral disturbance, unspecified dementia type (HCC)  Chronic in nature. Continue current medication regimen.  - QUEtiapine (SEROQUEL) 200 MG " Tab; Take 1 Tab by mouth every day.  Dispense: 90 Tab; Refill: 3    5. Recurrent major depressive disorder, in partial remission (HCC)  Chronic in nature. Continue current medication regimen.    6. Diabetes  Continue current dose of metformin, patient is not having low blood sugars, last A1c stable at 5.5.    Follow up in 6 months or sooner depending on lab results. Patient is encouraged to be seen in the emergency room for chest pain, palpitations, shortness of breath, dizziness, severe abdominal pain or other concerning symptoms.    Please note that this dictation was created using voice recognition software. I have made every reasonable attempt to correct obvious errors, but I expect that there are errors of grammar and possibly content that I did not discover before finalizing the note.    Assessment/Plan:  1. Risk for falls     2. Moderate protein-calorie malnutrition (HCC)     3. Iron deficiency anemia secondary to inadequate dietary iron intake  IRON/TOTAL IRON BIND    VITAMIN B12    FOLATE   4. Dementia with behavioral disturbance, unspecified dementia type (HCC)  QUEtiapine (SEROQUEL) 200 MG Tab   5. Recurrent major depressive disorder, in partial remission (HCC)     6. Controlled type 2 diabetes mellitus without complication, without long-term current use of insulin (HCC)            I have placed the below orders and discussed them with an approved delegating provider. The MA is performing the below orders under the direction of Dr. Townsend.

## 2019-10-29 NOTE — ASSESSMENT & PLAN NOTE
Chronic in nature. Continues to have difficulty with ambulation and is at a high risk for fall. Patient refuses to work with physical therapy at this time.

## 2019-10-29 NOTE — ASSESSMENT & PLAN NOTE
Chronic in nature. Stable. Discussed with patient and wife of implementing a high-calorie Ensure to help increase calorie intake. Patient denies nausea and abdominal pain.

## 2020-02-05 DIAGNOSIS — E03.9 ACQUIRED HYPOTHYROIDISM: ICD-10-CM

## 2020-02-05 RX ORDER — LEVOTHYROXINE SODIUM 0.05 MG/1
TABLET ORAL
Qty: 90 TAB | Refills: 0 | Status: SHIPPED | OUTPATIENT
Start: 2020-02-05 | End: 2020-05-06

## 2020-05-06 DIAGNOSIS — E03.9 ACQUIRED HYPOTHYROIDISM: ICD-10-CM

## 2020-05-06 RX ORDER — LEVOTHYROXINE SODIUM 50 UG/1
TABLET ORAL
Qty: 90 TAB | Refills: 0 | Status: SHIPPED | OUTPATIENT
Start: 2020-05-06 | End: 2020-07-27

## 2020-05-15 ENCOUNTER — PATIENT OUTREACH (OUTPATIENT)
Dept: HEALTH INFORMATION MANAGEMENT | Facility: OTHER | Age: 85
End: 2020-05-15

## 2020-05-15 NOTE — PROGRESS NOTES
Outcome: Left Message- SCP PA & AHA    Please transfer to Patient Outreach Team at [phone] when patient returns call.     [facility] Verified: yes     Attempt # 1

## 2020-07-26 DIAGNOSIS — E03.9 ACQUIRED HYPOTHYROIDISM: ICD-10-CM

## 2020-07-27 RX ORDER — LEVOTHYROXINE SODIUM 50 UG/1
TABLET ORAL
Qty: 90 TAB | Refills: 0 | Status: SHIPPED | OUTPATIENT
Start: 2020-07-27 | End: 2020-10-26

## 2020-09-21 DIAGNOSIS — F33.41 RECURRENT MAJOR DEPRESSIVE DISORDER, IN PARTIAL REMISSION (HCC): ICD-10-CM

## 2020-09-21 RX ORDER — DULOXETIN HYDROCHLORIDE 60 MG/1
CAPSULE, DELAYED RELEASE ORAL
Qty: 90 CAP | Refills: 0 | Status: SHIPPED | OUTPATIENT
Start: 2020-09-21 | End: 2020-12-15 | Stop reason: SDUPTHER

## 2020-10-25 DIAGNOSIS — E03.9 ACQUIRED HYPOTHYROIDISM: ICD-10-CM

## 2020-10-25 DIAGNOSIS — I10 ESSENTIAL HYPERTENSION: ICD-10-CM

## 2020-10-25 DIAGNOSIS — M54.50 CHRONIC MIDLINE LOW BACK PAIN WITHOUT SCIATICA: ICD-10-CM

## 2020-10-25 DIAGNOSIS — G89.29 CHRONIC MIDLINE LOW BACK PAIN WITHOUT SCIATICA: ICD-10-CM

## 2020-10-26 RX ORDER — AMLODIPINE BESYLATE 5 MG/1
TABLET ORAL
Qty: 90 TAB | Refills: 0 | Status: SHIPPED | OUTPATIENT
Start: 2020-10-26 | End: 2020-12-15

## 2020-10-26 RX ORDER — LEVOTHYROXINE SODIUM 50 UG/1
TABLET ORAL
Qty: 90 TAB | Refills: 0 | Status: SHIPPED | OUTPATIENT
Start: 2020-10-26 | End: 2020-12-15 | Stop reason: SDUPTHER

## 2020-10-26 RX ORDER — DOCUSATE SODIUM 100 MG/1
CAPSULE, LIQUID FILLED ORAL
Qty: 180 CAP | Refills: 0 | Status: SHIPPED | OUTPATIENT
Start: 2020-10-26 | End: 2021-01-28

## 2020-12-09 DIAGNOSIS — I10 ESSENTIAL HYPERTENSION: ICD-10-CM

## 2020-12-09 NOTE — TELEPHONE ENCOUNTER
Phone Number Called: [phone] (home)       Call outcome: Spoke with Pt's wife.    Message: Pt's wife notified that appt is needed to continue receiving refills on medication. Wife is interested in doing a virtual appt, but will need to coordinate with her granddaughter to have avaliability of a smart phone. Wife states she will call [phone] to schedule a virtual appt.       Received request via: Pharmacy    Was the patient seen in the last year in this department? No     Does the patient have an active prescription (recently filled or refills available) for medication(s) requested? No

## 2020-12-10 RX ORDER — LISINOPRIL 10 MG/1
TABLET ORAL
Qty: 100 TAB | Refills: 0 | Status: SHIPPED | OUTPATIENT
Start: 2020-12-10 | End: 2020-12-15 | Stop reason: SDUPTHER

## 2020-12-15 ENCOUNTER — TELEMEDICINE (OUTPATIENT)
Dept: MEDICAL GROUP | Facility: PHYSICIAN GROUP | Age: 85
End: 2020-12-15
Payer: MEDICARE

## 2020-12-15 VITALS — BODY MASS INDEX: 14 KG/M2 | WEIGHT: 100 LBS | HEIGHT: 71 IN

## 2020-12-15 DIAGNOSIS — F33.41 RECURRENT MAJOR DEPRESSIVE DISORDER, IN PARTIAL REMISSION (HCC): ICD-10-CM

## 2020-12-15 DIAGNOSIS — I10 ESSENTIAL HYPERTENSION: ICD-10-CM

## 2020-12-15 DIAGNOSIS — E11.9 CONTROLLED TYPE 2 DIABETES MELLITUS WITHOUT COMPLICATION, WITHOUT LONG-TERM CURRENT USE OF INSULIN (HCC): ICD-10-CM

## 2020-12-15 DIAGNOSIS — F03.91 DEMENTIA WITH BEHAVIORAL DISTURBANCE, UNSPECIFIED DEMENTIA TYPE: ICD-10-CM

## 2020-12-15 DIAGNOSIS — E03.9 ACQUIRED HYPOTHYROIDISM: ICD-10-CM

## 2020-12-15 PROCEDURE — 99214 OFFICE O/P EST MOD 30 MIN: CPT | Mod: 95,CR | Performed by: FAMILY MEDICINE

## 2020-12-15 RX ORDER — LISINOPRIL 10 MG/1
TABLET ORAL
Qty: 100 TAB | Refills: 3 | Status: SHIPPED | OUTPATIENT
Start: 2020-12-15 | End: 2021-01-01

## 2020-12-15 RX ORDER — LEVOTHYROXINE SODIUM 0.05 MG/1
TABLET ORAL
Qty: 90 TAB | Refills: 3 | Status: SHIPPED | OUTPATIENT
Start: 2020-12-15 | End: 2021-01-27

## 2020-12-15 RX ORDER — DULOXETIN HYDROCHLORIDE 60 MG/1
CAPSULE, DELAYED RELEASE ORAL
Qty: 90 CAP | Refills: 3 | Status: SHIPPED | OUTPATIENT
Start: 2020-12-15 | End: 2021-01-01 | Stop reason: SDUPTHER

## 2020-12-15 RX ORDER — QUETIAPINE FUMARATE 200 MG/1
200 TABLET, FILM COATED ORAL DAILY
Qty: 90 TAB | Refills: 3 | Status: SHIPPED | OUTPATIENT
Start: 2020-12-15 | End: 2021-01-01 | Stop reason: SDUPTHER

## 2020-12-15 ASSESSMENT — FIBROSIS 4 INDEX: FIB4 SCORE: 1.26

## 2020-12-15 NOTE — PROGRESS NOTES
Virtual Visit: Established Patient   This visit was conducted via Zoom using secure and encrypted videoconferencing technology. The patient was in a private location in the state of [place].    The patient's identity was confirmed and verbal consent was obtained for this virtual visit.    Subjective:   CC:   Chief Complaint   Patient presents with   • Medication Refill       Santosh Casas is a 86 y.o. male presenting for evaluation and management of:    #htn   Stable. Monitoring BP at home. Currently taking lisinopril 10mg dailiy as directed. Also taking baby aspirin. Denies lightheadedness, vision changes, headache, palpitations or leg swelling.    #DMT2  This is a chronic issue  On metformin 500mg once daily   Last A1c was year ago at 5.5    #Depression  This is a chronic issue  Seroquel 200mg daily and cymbalta 60mg daily     #Dementia  This is a chronic issue  On seroquel as needed    ROS   Denies any recent fevers or chills. No nausea or vomiting. No chest pains or shortness of breath.     Allergies   Allergen Reactions   • Nkda [No Known Drug Allergy]        Current medicines (including changes today)  Current Outpatient Medications   Medication Sig Dispense Refill   • lisinopril (PRINIVIL) 10 MG Tab TAKE 1 TABLET BY MOUTH ONCE DAILY (NEED  APPOINTMENT  FOR  REFILLS) 100 Tab 3   • metFORMIN (GLUCOPHAGE) 500 MG Tab TAKE 1 TABLET BY MOUTH TWICE DAILY WITH MEALS 200 Tab 3   • QUEtiapine (SEROQUEL) 200 MG Tab Take 1 Tab by mouth every day. 90 Tab 3   • DULoxetine (CYMBALTA) 60 MG Cap DR Particles delayed-release capsule Take 1 capsule by mouth once daily 90 Cap 3   • levothyroxine (EUTHYROX) 50 MCG Tab TAKE 1 TABLET BY MOUTH IN THE MORNING ON AN EMPTY STOMACH 90 Tab 3   • EQUATE STOOL SOFTENER 100 MG Cap Take 1 capsule by mouth twice daily 180 Cap 0   • polyethylene glycol/lytes (MIRALAX) Pack DISSOLVE 1 POWDER PACK IN WATER & DRINK EVERY 6 HOURS UNTIL BOWEL MOVEMENT 90 Each 0   • mupirocin (BACTROBAN) 2 %  Ointment Apply 1 Application to affected area(s) every day. 1 Tube 0   • triamcinolone acetonide (KENALOG) 0.1 % Cream Apply 1 Application to affected area(s) 2 times a day. 1 Tube 0   • ascorbic acid (VITAMIN C) 500 MG tablet Take 0.5 Tabs by mouth every day. supplement for wound healing 30 Tab 3   • bisacodyl (DULCOLAX) 10 MG Suppos Insert 1 Suppository in rectum every day. 10 Suppository 0     No current facility-administered medications for this visit.        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   • Acute URI 02/05/2018   • Moderate episode of recurrent major depressive disorder (HCC) 02/05/2018   • Risk for falls 11/27/2017   • Major depressive disorder 11/27/2017   • Eczema 11/27/2017   • Dementia with behavioral disturbance ([facility]) 08/25/2017   • Anemia, blood loss 05/12/2017   • Iron deficiency 05/12/2017   • Femur fracture, left ([facility]) 05/11/2017   • Chronic midline low back pain without sciatica 02/24/2017   • Protein calorie malnutrition ([facility]) 12/01/2016   • Controlled type 2 diabetes mellitus without complication, without long-term current use of insulin ([facility]) 12/01/2016   • Gait disorder 05/11/2016   • Insomnia 04/06/2016   • Squamous cell cancer of tongue (CMS-[facility]) 11/08/2012   • GERD (gastroesophageal reflux disease) 11/08/2010   • Anxiety 01/25/2010   • Chronic idiopathic gout involving toe of left foot without tophus 06/15/2009       Family History   Problem Relation Age of Onset   • Heart Disease Father    • Diabetes Neg Hx    • Stroke Neg Hx    • Cancer Neg Hx         stomach       He  has a past medical history of Arthritis, Bronchitis, CATARACT, Constipation (8/28/2013), Coughing blood, Dental disorder, Diabetes, Disease related peripheral neuropathy, Other specified  "disorder of intestines, Pain, Psychiatric disorder, Sleep apnea, Thyroid disease, Unspecified urinary incontinence, and Urinary bladder disorder.  He  has a past surgical history that includes cholecystectomy; laryngoscopy (5/18/2012); esophagoscopy (5/18/2012); and hip dhs imhs gamma (Left, 5/11/2017).       Objective:   Ht 1.803 m (5' 11\") Comment: pt reported  Wt 45.4 kg (100 lb) Comment: pt reported  BMI 13.95 kg/m²     Physical Exam:  Constitutional: Alert, no distress, well-groomed.  Skin: No rashes in visible areas.  Eye: Round. Conjunctiva clear, lids normal. No icterus.   ENMT: Lips pink without lesions, good dentition, moist mucous membranes. Phonation normal.  Neck: No masses, no thyromegaly. Moves freely without pain.  Respiratory: Unlabored respiratory effort, no cough or audible wheeze  Psych: Alert and oriented x3, normal affect and mood.       Assessment and Plan:   The following treatment plan was discussed:     1. Essential hypertension  Chronic, stable issue  Refills requested today  - lisinopril (PRINIVIL) 10 MG Tab; TAKE 1 TABLET BY MOUTH ONCE DAILY (NEED  APPOINTMENT  FOR  REFILLS)  Dispense: 100 Tab; Refill: 3    2. Controlled type 2 diabetes mellitus without complication, without long-term current use of insulin (HCC)  This is a  Chronic, stable issue  On metformin 500mg daily. Last A1c was 1 year ago and wnl.   Recommend repeat labs but will hold off for now due to the pandemic  PCP will continue to follow up diabetes   - metFORMIN (GLUCOPHAGE) 500 MG Tab; TAKE 1 TABLET BY MOUTH TWICE DAILY WITH MEALS  Dispense: 200 Tab; Refill: 3    3. Dementia with behavioral disturbance, unspecified dementia type (HCC)  Chronic, stable issue  On seroquel daily. Refill requested today    4. Recurrent major depressive disorder, in partial remission (HCC)  Chronic stable issue  Well managed with cymbalta 60mg daily. No SI/HI endorsed.   - QUEtiapine (SEROQUEL) 200 MG Tab; Take 1 Tab by mouth every day.  " Dispense: 90 Tab; Refill: 3  - DULoxetine (CYMBALTA) 60 MG Cap DR Particles delayed-release capsule; Take 1 capsule by mouth once daily  Dispense: 90 Cap; Refill: 3    5. Acquired hypothyroidism  Chronic, stable issue  Well controlled. Last TSH 1 year ago  Refill authorized today  - levothyroxine (EUTHYROX) 50 MCG Tab; TAKE 1 TABLET BY MOUTH IN THE MORNING ON AN EMPTY STOMACH  Dispense: 90 Tab; Refill: 3        Follow-up: Return in about 6 months (around 6/15/2021).

## 2021-01-01 ENCOUNTER — PATIENT OUTREACH (OUTPATIENT)
Dept: HEALTH INFORMATION MANAGEMENT | Facility: OTHER | Age: 86
End: 2021-01-01

## 2021-01-01 ENCOUNTER — HOME CARE VISIT (OUTPATIENT)
Dept: HOSPICE | Facility: HOSPICE | Age: 86
End: 2021-01-01
Payer: MEDICARE

## 2021-01-01 ENCOUNTER — TELEPHONE (OUTPATIENT)
Dept: HEALTH INFORMATION MANAGEMENT | Facility: OTHER | Age: 86
End: 2021-01-01

## 2021-01-01 ENCOUNTER — APPOINTMENT (OUTPATIENT)
Dept: RADIOLOGY | Facility: MEDICAL CENTER | Age: 86
End: 2021-01-01
Attending: EMERGENCY MEDICINE
Payer: MEDICARE

## 2021-01-01 ENCOUNTER — HOSPICE ADMISSION (OUTPATIENT)
Dept: HOSPICE | Facility: HOSPICE | Age: 86
End: 2021-01-01
Payer: MEDICARE

## 2021-01-01 ENCOUNTER — HOSPITAL ENCOUNTER (EMERGENCY)
Facility: MEDICAL CENTER | Age: 86
End: 2021-05-05
Attending: EMERGENCY MEDICINE
Payer: MEDICARE

## 2021-01-01 ENCOUNTER — TELEMEDICINE (OUTPATIENT)
Dept: MEDICAL GROUP | Facility: PHYSICIAN GROUP | Age: 86
End: 2021-01-01
Payer: MEDICARE

## 2021-01-01 ENCOUNTER — TELEPHONE (OUTPATIENT)
Dept: MEDICAL GROUP | Facility: PHYSICIAN GROUP | Age: 86
End: 2021-01-01

## 2021-01-01 ENCOUNTER — APPOINTMENT (OUTPATIENT)
Dept: MEDICAL GROUP | Facility: PHYSICIAN GROUP | Age: 86
End: 2021-01-01
Payer: MEDICARE

## 2021-01-01 ENCOUNTER — PATIENT MESSAGE (OUTPATIENT)
Dept: HEALTH INFORMATION MANAGEMENT | Facility: OTHER | Age: 86
End: 2021-01-01

## 2021-01-01 VITALS — DIASTOLIC BLOOD PRESSURE: 60 MMHG | HEART RATE: 84 BPM | SYSTOLIC BLOOD PRESSURE: 130 MMHG

## 2021-01-01 VITALS
HEART RATE: 91 BPM | BODY MASS INDEX: 14 KG/M2 | TEMPERATURE: 97.7 F | SYSTOLIC BLOOD PRESSURE: 119 MMHG | HEIGHT: 71 IN | OXYGEN SATURATION: 90 % | DIASTOLIC BLOOD PRESSURE: 76 MMHG | WEIGHT: 100 LBS

## 2021-01-01 VITALS — HEART RATE: 88 BPM | DIASTOLIC BLOOD PRESSURE: 60 MMHG | RESPIRATION RATE: 16 BRPM | SYSTOLIC BLOOD PRESSURE: 122 MMHG

## 2021-01-01 VITALS — DIASTOLIC BLOOD PRESSURE: 60 MMHG | HEART RATE: 80 BPM | RESPIRATION RATE: 24 BRPM | SYSTOLIC BLOOD PRESSURE: 140 MMHG

## 2021-01-01 VITALS — RESPIRATION RATE: 16 BRPM | SYSTOLIC BLOOD PRESSURE: 110 MMHG | DIASTOLIC BLOOD PRESSURE: 70 MMHG | HEART RATE: 80 BPM

## 2021-01-01 VITALS — WEIGHT: 110 LBS | HEIGHT: 68 IN | HEART RATE: 79 BPM | BODY MASS INDEX: 16.67 KG/M2

## 2021-01-01 VITALS — DIASTOLIC BLOOD PRESSURE: 60 MMHG | RESPIRATION RATE: 16 BRPM | SYSTOLIC BLOOD PRESSURE: 128 MMHG | HEART RATE: 76 BPM

## 2021-01-01 VITALS — HEART RATE: 58 BPM | DIASTOLIC BLOOD PRESSURE: 62 MMHG | SYSTOLIC BLOOD PRESSURE: 98 MMHG | RESPIRATION RATE: 16 BRPM

## 2021-01-01 VITALS — DIASTOLIC BLOOD PRESSURE: 58 MMHG | HEART RATE: 76 BPM | RESPIRATION RATE: 16 BRPM | SYSTOLIC BLOOD PRESSURE: 122 MMHG

## 2021-01-01 VITALS
RESPIRATION RATE: 24 BRPM | DIASTOLIC BLOOD PRESSURE: 72 MMHG | TEMPERATURE: 97.4 F | HEART RATE: 72 BPM | SYSTOLIC BLOOD PRESSURE: 140 MMHG

## 2021-01-01 VITALS — HEART RATE: 60 BPM | SYSTOLIC BLOOD PRESSURE: 140 MMHG | RESPIRATION RATE: 20 BRPM | DIASTOLIC BLOOD PRESSURE: 60 MMHG

## 2021-01-01 DIAGNOSIS — M25.552 LEFT HIP PAIN: ICD-10-CM

## 2021-01-01 DIAGNOSIS — F33.1 MODERATE EPISODE OF RECURRENT MAJOR DEPRESSIVE DISORDER (HCC): ICD-10-CM

## 2021-01-01 DIAGNOSIS — F33.41 RECURRENT MAJOR DEPRESSIVE DISORDER, IN PARTIAL REMISSION (HCC): ICD-10-CM

## 2021-01-01 DIAGNOSIS — F03.91 DEMENTIA WITH BEHAVIORAL DISTURBANCE, UNSPECIFIED DEMENTIA TYPE: ICD-10-CM

## 2021-01-01 DIAGNOSIS — E11.9 CONTROLLED TYPE 2 DIABETES MELLITUS WITHOUT COMPLICATION, WITHOUT LONG-TERM CURRENT USE OF INSULIN (HCC): ICD-10-CM

## 2021-01-01 DIAGNOSIS — E44.0 MODERATE PROTEIN-CALORIE MALNUTRITION (HCC): ICD-10-CM

## 2021-01-01 DIAGNOSIS — I10 ESSENTIAL HYPERTENSION: ICD-10-CM

## 2021-01-01 DIAGNOSIS — R46.89 COGNITIVE AND BEHAVIORAL CHANGES: ICD-10-CM

## 2021-01-01 DIAGNOSIS — E03.9 ACQUIRED HYPOTHYROIDISM: ICD-10-CM

## 2021-01-01 DIAGNOSIS — R41.89 COGNITIVE AND BEHAVIORAL CHANGES: ICD-10-CM
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
## 2021-01-01 DIAGNOSIS — C02.9 SQUAMOUS CELL CANCER OF TONGUE (HCC): Primary | ICD-10-CM

## 2021-01-01 PROCEDURE — S9126 HOSPICE CARE, IN THE HOME, P: HCPCS

## 2021-01-01 PROCEDURE — G0299 HHS/HOSPICE OF RN EA 15 MIN: HCPCS

## 2021-01-01 PROCEDURE — 96374 THER/PROPH/DIAG INJ IV PUSH: CPT

## 2021-01-01 PROCEDURE — A4554 DISPOSABLE UNDERPADS: HCPCS

## 2021-01-01 PROCEDURE — 99284 EMERGENCY DEPT VISIT MOD MDM: CPT

## 2021-01-01 PROCEDURE — 700111 HCHG RX REV CODE 636 W/ 250 OVERRIDE (IP): Performed by: EMERGENCY MEDICINE

## 2021-01-01 PROCEDURE — 72170 X-RAY EXAM OF PELVIS: CPT

## 2021-01-01 PROCEDURE — 665036 HSPC NOTICE OF ELECTION NOE

## 2021-01-01 PROCEDURE — 73552 X-RAY EXAM OF FEMUR 2/>: CPT | Mod: LT

## 2021-01-01 PROCEDURE — A5120 SKIN BARRIER, WIPE OR SWAB: HCPCS

## 2021-01-01 PROCEDURE — 99214 OFFICE O/P EST MOD 30 MIN: CPT | Mod: 95,CR | Performed by: PHYSICIAN ASSISTANT

## 2021-01-01 PROCEDURE — G0155 HHCP-SVS OF CSW,EA 15 MIN: HCPCS

## 2021-01-01 RX ORDER — MORPHINE SULFATE 4 MG/ML
4 INJECTION, SOLUTION INTRAMUSCULAR; INTRAVENOUS ONCE
Status: COMPLETED | OUTPATIENT
Start: 2021-01-01 | End: 2021-01-01

## 2021-01-01 RX ORDER — MORPHINE SULFATE 100 MG/5ML
5 SOLUTION ORAL
Qty: 120 ML | Refills: 0 | Status: SHIPPED | OUTPATIENT
Start: 2021-01-01 | End: 2022-01-01

## 2021-01-01 RX ORDER — DULOXETIN HYDROCHLORIDE 60 MG/1
CAPSULE, DELAYED RELEASE ORAL
Qty: 90 CAPSULE | Refills: 3
Start: 2021-01-01 | End: 2021-01-01

## 2021-01-01 RX ORDER — AMLODIPINE BESYLATE 5 MG/1
TABLET ORAL
Qty: 90 TABLET | Refills: 0 | Status: SHIPPED | OUTPATIENT
Start: 2021-01-01 | End: 2021-01-01

## 2021-01-01 RX ORDER — QUETIAPINE FUMARATE 200 MG/1
200 TABLET, FILM COATED ORAL DAILY
Qty: 90 TABLET | Refills: 3
Start: 2021-01-01 | End: 2021-01-01

## 2021-01-01 RX ADMIN — MORPHINE SULFATE 4 MG: 4 INJECTION INTRAVENOUS at 19:55

## 2021-01-01 RX ADMIN — Medication 10 MG: at 14:34

## 2021-01-01 ASSESSMENT — ENCOUNTER SYMPTOMS
STOOL FREQUENCY: LESS THAN DAILY
DESCRIPTION OF MEMORY LOSS: LONG TERM
BOWEL INCONTINENCE: 1
PERSON REPORTING PAIN: DIRECT OBSERVATION
SLEEP QUALITY: FAIR
DENIES PAIN: 1
FATIGUE: 1
PAIN: 1
LAST BOWEL MOVEMENT: 66091
DESCRIPTION OF MEMORY LOSS: LONG TERM
LAST BOWEL MOVEMENT: 66070
FATIGUES EASILY: 1
DESCRIPTION OF MEMORY LOSS: IMMEDIATE
LAST BOWEL MOVEMENT: 66062
BOWEL INCONTINENCE: 1
FATIGUE: 1
LAST BOWEL MOVEMENT: 66049
LAST BOWEL MOVEMENT: 66077
FATIGUE: 1
BOWEL INCONTINENCE: 1
DENIES PAIN: 1
DESCRIPTION OF MEMORY LOSS: LONG TERM
DESCRIPTION OF MEMORY LOSS: SHORT TERM
STOOL FREQUENCY: LESS THAN DAILY
SLEEP QUALITY: FAIR
DESCRIPTION OF MEMORY LOSS: IMMEDIATE
BOWEL INCONTINENCE: 1
DESCRIPTION OF MEMORY LOSS: IMMEDIATE
DENIES PAIN: 1
DESCRIPTION OF MEMORY LOSS: LONG TERM
LAST BOWEL MOVEMENT: 66037
FATIGUE: 1
STOOL FREQUENCY: LESS THAN DAILY
BOWEL INCONTINENCE: 1
HYPOTENSION: 1
SLEEP QUALITY: FAIR
BOWEL INCONTINENCE: 1
FATIGUES EASILY: 1
FATIGUES EASILY: 1
LAST BOWEL MOVEMENT: 66084
DESCRIPTION OF MEMORY LOSS: SHORT TERM
SLEEP QUALITY: FAIR
DESCRIPTION OF MEMORY LOSS: SHORT TERM
DESCRIPTION OF MEMORY LOSS: IMMEDIATE
DESCRIPTION OF MEMORY LOSS: SHORT TERM
STOOL FREQUENCY: LESS THAN DAILY
LAST BOWEL MOVEMENT: 66041
STOOL FREQUENCY: LESS THAN DAILY
DESCRIPTION OF MEMORY LOSS: LONG TERM
STOOL FREQUENCY: LESS THAN DAILY
DESCRIPTION OF MEMORY LOSS: SHORT TERM
DESCRIPTION OF MEMORY LOSS: IMMEDIATE
FATIGUES EASILY: 1
DESCRIPTION OF MEMORY LOSS: IMMEDIATE
FATIGUE: 1
FATIGUES EASILY: 1
SLEEP QUALITY: FAIR
DENIES PAIN: 1
SLEEP QUALITY: FAIR
FATIGUES EASILY: 1
FATIGUES EASILY: 1
BOWEL INCONTINENCE: 1
STOOL FREQUENCY: LESS THAN DAILY
BOWEL INCONTINENCE: 1
STOOL FREQUENCY: LESS THAN DAILY
DESCRIPTION OF MEMORY LOSS: IMMEDIATE
SLEEP QUALITY: FAIR
BOWEL INCONTINENCE: 1
FATIGUES EASILY: 1
LAST BOWEL MOVEMENT: 66091
DESCRIPTION OF MEMORY LOSS: LONG TERM
UNABLE TO COMMUNICATE PAIN: 1
DENIES PAIN: 1
DESCRIPTION OF MEMORY LOSS: LONG TERM
SLEEP QUALITY: FAIR
STOOL FREQUENCY: LESS THAN DAILY

## 2021-01-01 ASSESSMENT — ACTIVITIES OF DAILY LIVING (ADL)
CONTINENCE_REQUIRES_ASSISTANCE: 1
MONEY MANAGEMENT (EXPENSES/BILLS): TOTALLY DEPENDENT
PHYSICAL_TRANSFER_REQUIRES_ASSISTANCE: 1
DRESSING_REQUIRES_ASSISTANCE: 1
MONEY MANAGEMENT (EXPENSES/BILLS): TOTALLY DEPENDENT
MONEY MANAGEMENT (EXPENSES/BILLS): TOTALLY DEPENDENT
DRESSING_REQUIRES_ASSISTANCE: 1
BATHING_REQUIRES_ASSISTANCE: 1
PHYSICAL_TRANSFER_REQUIRES_ASSISTANCE: 1
AMBULATION_REQUIRES_ASSISTANCE: 1
MONEY MANAGEMENT (EXPENSES/BILLS): TOTALLY DEPENDENT
MONEY MANAGEMENT (EXPENSES/BILLS): TOTALLY DEPENDENT
BATHING_REQUIRES_ASSISTANCE: 1
EATING_REQUIRES_ASSISTANCE: 1
MONEY MANAGEMENT (EXPENSES/BILLS): TOTALLY DEPENDENT
MONEY MANAGEMENT (EXPENSES/BILLS): TOTALLY DEPENDENT
CONTINENCE_REQUIRES_ASSISTANCE: 1
AMBULATION_REQUIRES_ASSISTANCE: 1

## 2021-01-01 ASSESSMENT — PATIENT HEALTH QUESTIONNAIRE - PHQ9
CLINICAL INTERPRETATION OF PHQ2 SCORE: 0
7. TROUBLE CONCENTRATING ON THINGS, SUCH AS READING THE NEWSPAPER OR WATCHING TELEVISION: SEVERAL DAYS
SUM OF ALL RESPONSES TO PHQ9 QUESTIONS 1 AND 2: 4
9. THOUGHTS THAT YOU WOULD BE BETTER OFF DEAD, OR OF HURTING YOURSELF: SEVERAL DAYS
2. FEELING DOWN, DEPRESSED, IRRITABLE, OR HOPELESS: SEVERAL DAYS
SUM OF ALL RESPONSES TO PHQ QUESTIONS 1-9: 18
6. FEELING BAD ABOUT YOURSELF - OR THAT YOU ARE A FAILURE OR HAVE LET YOURSELF OR YOUR FAMILY DOWN: NOT AL ALL
8. MOVING OR SPEAKING SO SLOWLY THAT OTHER PEOPLE COULD HAVE NOTICED. OR THE OPPOSITE, BEING SO FIGETY OR RESTLESS THAT YOU HAVE BEEN MOVING AROUND A LOT MORE THAN USUAL: NEARLY EVERY DAY
3. TROUBLE FALLING OR STAYING ASLEEP OR SLEEPING TOO MUCH: NEARLY EVERY DAY
5. POOR APPETITE OR OVEREATING: NEARLY EVERY DAY
4. FEELING TIRED OR HAVING LITTLE ENERGY: NEARLY EVERY DAY
1. LITTLE INTEREST OR PLEASURE IN DOING THINGS: NEARLY EVERY DAY

## 2021-01-01 ASSESSMENT — PAIN SCALES - PAIN ASSESSMENT IN ADVANCED DEMENTIA (PAINAD)
CONSOLABILITY: 0 - NO NEED TO CONSOLE.
FACIALEXPRESSION: 2 - FACIAL GRIMACING.
NEGVOCALIZATION: 0 - NONE.
BODYLANGUAGE: 1 - TENSE. DISTRESSED PACING. FIDGETING.
FACIALEXPRESSION: 0 - SMILING OR INEXPRESSIVE.
TOTALSCORE: 5
FACIALEXPRESSION: 0 - SMILING OR INEXPRESSIVE.
BODYLANGUAGE: 0 - RELAXED.
CONSOLABILITY: 1 - DISTRACTED OR REASSURED BY VOICE OR TOUCH.
TOTALSCORE: 0
NEGVOCALIZATION: 1 - OCCASIONAL MOAN OR GROAN. LOW-LEVEL SPEECH WITH A NEGATIVE OR DISAPPROVING QUALITY.
BODYLANGUAGE: 1 - TENSE. DISTRESSED PACING. FIDGETING.
NEGVOCALIZATION: 0 - NONE.
FACIALEXPRESSION: 0 - SMILING OR INEXPRESSIVE.
FACIALEXPRESSION: 0 - SMILING OR INEXPRESSIVE.
CONSOLABILITY: 0 - NO NEED TO CONSOLE.
BODYLANGUAGE: 0 - RELAXED.
NEGVOCALIZATION: 1 - OCCASIONAL MOAN OR GROAN. LOW-LEVEL SPEECH WITH A NEGATIVE OR DISAPPROVING QUALITY.
CONSOLABILITY: 0 - NO NEED TO CONSOLE.
TOTALSCORE: 1
TOTALSCORE: 2
CONSOLABILITY: 0 - NO NEED TO CONSOLE.
TOTALSCORE: 0
BODYLANGUAGE: 0 - RELAXED.
NEGVOCALIZATION: 0 - NONE.

## 2021-01-01 ASSESSMENT — LIFESTYLE VARIABLES: DO YOU DRINK ALCOHOL: NO

## 2021-01-01 ASSESSMENT — FIBROSIS 4 INDEX
FIB4 SCORE: 1.28
FIB4 SCORE: 1.28

## 2021-01-01 ASSESSMENT — SOCIAL DETERMINANTS OF HEALTH (SDOH)
ACTIVE STRESSOR - HEALTH CHANGES: 1

## 2021-01-11 DIAGNOSIS — Z23 NEED FOR VACCINATION: ICD-10-CM

## 2021-01-26 DIAGNOSIS — M54.50 CHRONIC MIDLINE LOW BACK PAIN WITHOUT SCIATICA: ICD-10-CM

## 2021-01-26 DIAGNOSIS — E03.9 ACQUIRED HYPOTHYROIDISM: ICD-10-CM

## 2021-01-26 DIAGNOSIS — G89.29 CHRONIC MIDLINE LOW BACK PAIN WITHOUT SCIATICA: ICD-10-CM

## 2021-01-26 DIAGNOSIS — I10 ESSENTIAL HYPERTENSION: ICD-10-CM

## 2021-01-28 RX ORDER — AMLODIPINE BESYLATE 5 MG/1
TABLET ORAL
Qty: 90 TAB | Refills: 0 | Status: SHIPPED | OUTPATIENT
Start: 2021-01-28 | End: 2021-01-01

## 2021-01-28 RX ORDER — DOCUSATE SODIUM 100 MG/1
CAPSULE, LIQUID FILLED ORAL
Qty: 180 CAP | Refills: 0 | Status: SHIPPED | OUTPATIENT
Start: 2021-01-28 | End: 2021-01-01

## 2021-01-28 RX ORDER — LEVOTHYROXINE SODIUM 50 UG/1
TABLET ORAL
Qty: 90 TAB | Refills: 0 | Status: SHIPPED | OUTPATIENT
Start: 2021-01-28 | End: 2021-01-01

## 2021-05-06 NOTE — ED TRIAGE NOTES
Chief Complaint   Patient presents with   • T-5000 GLF   • Hip Pain     88 yo male bib EMS for above complaint. Per EMS - patient is AOX1 at baseline, hx of dementia. Patient reports falling out of bed around 0900 this am. -LOC -thinners.   Patient is c/o 8/10 L hip pain.  Per EMS - no interventions given en route.    VSS. Call light within reach

## 2021-05-06 NOTE — DISCHARGE INSTRUCTIONS
Return the emergency department if you have increasing pain, numbness, tingling or cold blue foot.  Ibuprofen and Tylenol for pain.

## 2021-05-06 NOTE — ED NOTES
DC home with written and verbal instructions regarding f/u, instructions explained to wife.  Verbalized understanding, WC out.

## 2021-05-06 NOTE — ED PROVIDER NOTES
ED Provider Note    Scribed for Juan Daniel Painter M.D. by Giovani Black. 5/5/2021, 7:12 PM.    Primary care provider: GARCIA Otero  Means of arrival: EMS  History obtained from: patient, spouse  History limited by: none    CHIEF COMPLAINT  Chief Complaint   Patient presents with   • T-5000 GLF   • Hip Pain       HPI  Santosh Casas is a 87 y.o. male who presents to the Emergency Department for left hip pain secondary to a ground level fall onset 9 AM this morning. The patient reportedly fell while getting out of bed at 9 AM today. Per his wife, the patient has dementia and is bedridden, but occasionally gets hallucinations that he can walk. He was trying to get out of bed this morning when the lower half of his body slid out of the bed onto the uncarpeted floor. The patient's wife states his upper body was still on the bed when she saw him, so she suspects he did not hit his head or lose consciousness. The patient does not remember the event.  He reports associated left thigh pain. His pain is exacerbated by movement of his left leg but not by palpation. No swelling, erythema. The patient is not on anticoagulants. He takes levothyroxine, metformin, doc-q-lace, amlodipine, lisinopril, quetiapine, duloxetine, and melatonin. He reportedly had COVID in January of this year and has not had the vaccine yet.    REVIEW OF SYSTEMS  Pertinent positives include left hip pain, left thigh pain, ground level fall. Pertinent negatives include no loss of consciousness, swelling, erythema. All other systems negative.    PAST MEDICAL HISTORY   has a past medical history of Arthritis, Bronchitis, CATARACT, Constipation (8/28/2013), Coughing blood, Dental disorder, Diabetes, Disease related peripheral neuropathy, Other specified disorder of intestines, Pain, Psychiatric disorder, Sleep apnea, Thyroid disease, Unspecified urinary incontinence, and Urinary bladder disorder.    SURGICAL HISTORY   has a past  "surgical history that includes cholecystectomy; laryngoscopy (2012); esophagoscopy (2012); and hip dhs hs gamma (Left, 2017).    SOCIAL HISTORY  Social History     Tobacco Use   • Smoking status: Former Smoker     Packs/day: 1.00     Years: 10.00     Pack years: 10.00     Types: Cigarettes     Quit date: 1965     Years since quittin.3   • Smokeless tobacco: Never Used   Substance Use Topics   • Alcohol use: No   • Drug use: No      Social History     Substance and Sexual Activity   Drug Use No       FAMILY HISTORY  Family History   Problem Relation Age of Onset   • Heart Disease Father    • Diabetes Neg Hx    • Stroke Neg Hx    • Cancer Neg Hx         stomach       CURRENT MEDICATIONS  Home Medications    **Home medications have not yet been reviewed for this encounter**         ALLERGIES  Allergies   Allergen Reactions   • Nkda [No Known Drug Allergy]        PHYSICAL EXAM  VITAL SIGNS: /67   Pulse 81   Temp 36.6 °C (97.8 °F) (Temporal)   Ht 1.803 m (5' 11\")   Wt 45.4 kg (100 lb)   SpO2 90%   BMI 13.95 kg/m²     Constitutional: Well developed, Well nourished, mild distress.   HENT: Normocephalic, Atraumatic, mask in place.  Eyes: Conjunctiva normal, No discharge.   Cardiovascular: Normal heart rate, Normal rhythm, No murmurs, equal pulses.   Pulmonary: Normal breath sounds, No respiratory distress, No wheezing, No rales, No rhonchi.  Abdomen:Soft, No tenderness.   Back: No CVA tenderness.  No vertebral point tenderness.  Musculoskeletal: No major deformities noted. Pelvis stable. No pain or tenderness to palpation over left greater trochanter. Questionable pain with log roll of left hip. Pain to left mid-femur w/ extension and palpation. No obvious deformity. Well healed previous left hip replacement. No pain or tenderness to knee, shin, or foot, 2+ DP pulse  Skin: Warm, Dry, Chronic scab to left lateral foot, no surrounding erythema, warmth, or tenderness. No rash. "   Neurologic: Alert & oriented x 3, Normal motor function,  No focal deficits noted.   Psychiatric: Affect normal, Judgment normal, Mood normal.     RADIOLOGY  DX-PELVIS-1 OR 2 VIEWS   Final Result      No acute bony abnormality. The bony evaluation however is compromised due to the profound osteopenia.      DX-FEMUR-2+ LEFT   Final Result      No radiographic evidence of acute traumatic injury.        The radiologist's interpretation of all radiological studies have been reviewed by me.    COURSE & MEDICAL DECISION MAKING  Pertinent Labs & Imaging studies reviewed. (See chart for details)    7:08 PM - Spoke with the resident about their impression and plan for the patient.    7:12 PM - Patient seen and examined at bedside. Patient will be treated with morphine 4 mg/mL 4 mg. Ordered DX pelvis, DX femur left to evaluate his symptoms. The differential diagnoses include but are not limited to: Fracture, strain, contusion    9:44 PM - Patient seen at bedside. Discussed imaging results with the patient and updated them on the plan of care, including discharge. I answered all questions regarding their care and discussed strict return precautions for new or changing symptoms. Patient and spouse verbalize understanding and agreement to this plan of care.       Medical Decision Making: At this point time I do not see any fracture on x-rays.  He actually has very minimal pain therefore I do not think any advanced imaging is necessary.  Discussed ibuprofen and Tylenol for pain control I suspect he has a muscle strain or contusion from his fall.  Patient is mostly bedridden in general therefore I think the patient can be discharged home.    The patient will return for new or worsening symptoms and is stable at the time of discharge.    The patient is referred to a primary physician for blood pressure management, diabetic screening, and for all other preventative health concerns.      DISPOSITION:  Patient will be discharged  home in stable condition.    FOLLOW UP:  Jesus Salazar, A.P.R.N.  [address]  [place] [address] [phone]    Schedule an appointment as soon as possible for a visit in 3 days        OUTPATIENT MEDICATIONS:  Discharge Medication List as of 5/5/2021  9:34 PM             FINAL IMPRESSION  1. Fall, initial encounter    2. Left hip pain          Giovani CHAU (Balta), am scribing for, and in the presence of, Juan Daniel Painter M.D.    Electronically signed by: Giovani Black (Balta), 5/5/2021    IJuan Daniel M.D. personally performed the services described in this documentation, as scribed by Giovani Black in my presence, and it is both accurate and complete. E    The note accurately reflects work and decisions made by me.  Juan Daniel Painter M.D.  5/5/2021  10:44 PM

## 2021-05-07 NOTE — PROGRESS NOTES
Outcome: Left Message- ED    Please transfer to Patient Outreach Team at [phone] when patient returns call.      [facility] Verified: yes    Attempt # 1

## 2021-07-21 NOTE — PROGRESS NOTES
Virtual Visit: Established Patient   This visit was conducted via Zoom using secure and encrypted videoconferencing technology. The patient was in a private location in the state of [place].    The patient's identity was confirmed and verbal consent was obtained for this virtual visit.    Subjective:   CC:   Chief Complaint   Patient presents with   • Follow-Up     Dementia/Depression       Santosh Casas is a 87 y.o. male presenting for evaluation and management of:    Patient is a pleasant 87-year-old male here today with his grand daughter Caitlin.  Caitlin is speaking on behalf of of medication during today's appointment.  Patient lives with his wife and Caitlin helps him daily.  Patient has a positive medical history of dementia and depression.  Hazel tells me that patient has never been formally diagnosed with dementia.  States the past 3-5 months there has been cognitive changes as well as behavioral changes.  She tells me that his behavior is erratic and he will sleep during the day and stay up all night talking.  States sometimes he thinks someone is under the bed or in the house.  He is able to recognize family members.  She tells me that at times he still thinks that his parents are living.  She has noticed that his appetite is decreasing.  He still eating on his own.  Patient is a mobile uses a wheelchair.  Patient has been immobile since breaking his hip a few years ago.  Patient is prescribed Seroquel 200 mg once nightly and Cymbalta 60 mg once daily.  He takes medications compliantly and did not experience that Effexor complications medication.  Patient requires 24-hour care.  And his wife and granddaughter make sure he is taking care of.    Patient is not experiencing urgency, frequency, dysuria, hematuria, pelvic pain, headaches, night sweats, fever, chills.      ROS   Denies any recent fevers or chills. No nausea or vomiting. No chest pains or shortness of breath.     Allergies   Allergen Reactions    • Nkda [No Known Drug Allergy]        Current medicines (including changes today)  Current Outpatient Medications   Medication Sig Dispense Refill   • EQUATE STOOL SOFTENER 100 MG Cap Take 1 capsule by mouth twice daily 90 capsule 3   • amLODIPine (NORVASC) 5 MG Tab Take 1 tablet by mouth once daily 90 tablet 0   • EUTHYROX 50 MCG Tab TAKE 1 TABLET BY MOUTH IN THE MORNING ON AN EMPTY STOMACH 90 Tab 0   • lisinopril (PRINIVIL) 10 MG Tab TAKE 1 TABLET BY MOUTH ONCE DAILY (NEED  APPOINTMENT  FOR  REFILLS) 100 Tab 3   • metFORMIN (GLUCOPHAGE) 500 MG Tab TAKE 1 TABLET BY MOUTH TWICE DAILY WITH MEALS (Patient taking differently: every day. TAKE 1 TABLET BY MOUTH TWICE DAILY WITH MEALS) 200 Tab 3   • QUEtiapine (SEROQUEL) 200 MG Tab Take 1 Tab by mouth every day. (Patient taking differently: Take 200 mg by mouth every day. Taking one every other day) 90 Tab 3   • DULoxetine (CYMBALTA) 60 MG Cap DR Particles delayed-release capsule Take 1 capsule by mouth once daily (Patient taking differently: Taking 1 capsule by mouth once every other day) 90 Cap 3   • polyethylene glycol/lytes (MIRALAX) Pack DISSOLVE 1 POWDER PACK IN WATER & DRINK EVERY 6 HOURS UNTIL BOWEL MOVEMENT 90 Each 0   • mupirocin (BACTROBAN) 2 % Ointment Apply 1 Application to affected area(s) every day. 1 Tube 0   • triamcinolone acetonide (KENALOG) 0.1 % Cream Apply 1 Application to affected area(s) 2 times a day. 1 Tube 0   • ascorbic acid (VITAMIN C) 500 MG tablet Take 0.5 Tabs by mouth every day. supplement for wound healing 30 Tab 3     No current facility-administered medications for this visit.       Patient Active Problem List    Diagnosis Date Noted   • Iron deficiency anemia secondary to inadequate dietary iron intake 10/28/2019   • Neurotic excoriations 05/07/2018   • Acute URI 02/05/2018   • Moderate episode of recurrent major depressive disorder (HCC) 02/05/2018   • Risk for falls 11/27/2017   • Major depressive disorder 11/27/2017   • Eczema  "11/27/2017   • Dementia with behavioral disturbance ([facility]) 08/25/2017   • Anemia, blood loss 05/12/2017   • Iron deficiency 05/12/2017   • Femur fracture, left ([facility]) 05/11/2017   • Chronic midline low back pain without sciatica 02/24/2017   • Protein calorie malnutrition ([facility]) 12/01/2016   • Controlled type 2 diabetes mellitus without complication, without long-term current use of insulin ([facility]) 12/01/2016   • Gait disorder 05/11/2016   • Insomnia 04/06/2016   • Squamous cell cancer of tongue ([facility]) 11/08/2012   • Hypertension 11/22/2011   • GERD (gastroesophageal reflux disease) 11/08/2010   • Anxiety 01/25/2010   • Hypothyroidism 09/01/2009   • Chronic idiopathic gout involving toe of left foot without tophus 06/15/2009       Family History   Problem Relation Age of Onset   • Heart Disease Father    • Diabetes Neg Hx    • Stroke Neg Hx    • Cancer Neg Hx         stomach       He  has a past medical history of Arthritis, Bronchitis, CATARACT, Constipation (8/28/2013), Coughing blood, Dental disorder, Diabetes, Disease related peripheral neuropathy, Other specified disorder of intestines, Pain, Psychiatric disorder, Sleep apnea, Thyroid disease, Unspecified urinary incontinence, and Urinary bladder disorder.  He  has a past surgical history that includes cholecystectomy; laryngoscopy (5/18/2012); esophagoscopy (5/18/2012); and hip dhs hs gamma (Left, 5/11/2017).       Objective:   Pulse 79 Comment: Family reported  Ht 1.727 m (5' 8\") Comment: Family reported  Wt 49.9 kg (110 lb) Comment: Family reported last weight  BMI 16.73 kg/m²     Physical Exam:  Constitutional: Alert, no distress, well-groomed.  Skin: No rashes in visible areas.  Eye: Round. Conjunctiva clear, lids normal. No icterus.   ENMT: Lips pink without lesions, good dentition, moist mucous membranes. Phonation normal.  Neck: No masses, no thyromegaly. Moves freely without pain.  Respiratory: Unlabored respiratory effort, no cough or audible " wheeze  Psych: Alert and oriented x3, normal affect and mood.       Assessment and Plan:   The following treatment plan was discussed:     1. Dementia with behavioral disturbance, unspecified dementia type (HCC)  2. Moderate episode of recurrent major depressive disorder (HCC)  3. Cognitive and behavioral changes  Discussed with Caitlin that patient will be referred to Dr. Carolyn Blackwood for further evaluation.  Continue current medication regimen.    Discussed importance of regularly staying hydrated and eating adequately.      Follow-up for worsening symptoms,lack of expected recovery, or should new symptoms or problems arise.      - REFERRAL TO NEUROLOGY        Follow-up: Return if symptoms worsen or fail to improve.

## 2021-10-19 NOTE — TELEPHONE ENCOUNTER
These call hospice and find out if, like home health, the patient requires a face-to-face visit for placement of the referral.  If he does not require a visit I am happy to place a referral.

## 2021-10-19 NOTE — TELEPHONE ENCOUNTER
VOICEMAIL  1. Caller Name: [facility]                      Call Back Number: [phone]    2. Message: Mya has called 3 times requesting referral for hospice services. The wife of Santosh wants the referral as soon as possible.     3. Patient approves office to leave a detailed voicemail/MyChart message: N\A

## 2021-10-19 NOTE — TELEPHONE ENCOUNTER
Phone Number Called: [phone]    Call outcome:  Spoke with a [facility] Rep    Message: Asked them about needing a face to face visit like Home health.  The representative was not sure, and stated they would have one of the Admit RN's call me back with the answer to that question.

## 2021-10-21 NOTE — TELEPHONE ENCOUNTER
Phone Number Called: [phone]    Call outcome:  Spoke with Nessa     Message: LM again about the question below. 2nd call

## 2021-10-21 NOTE — TELEPHONE ENCOUNTER
VOICEMAIL  1. Caller Name: Kathe                      Call Back Number: [phone]    2. Message: Called to inform the PCP no face to face is needed for Hospice referral.  Stated if PCP should have other questions to please contact their office at the phone number above.

## 2022-01-01 ENCOUNTER — HOME CARE VISIT (OUTPATIENT)
Dept: HOSPICE | Facility: HOSPICE | Age: 87
End: 2022-01-01
Payer: MEDICARE

## 2022-01-01 VITALS — HEART RATE: 82 BPM | SYSTOLIC BLOOD PRESSURE: 88 MMHG | RESPIRATION RATE: 12 BRPM | DIASTOLIC BLOOD PRESSURE: 52 MMHG

## 2022-01-01 VITALS — SYSTOLIC BLOOD PRESSURE: 100 MMHG | DIASTOLIC BLOOD PRESSURE: 50 MMHG | RESPIRATION RATE: 10 BRPM | HEART RATE: 100 BPM

## 2022-01-01 VITALS — HEART RATE: 80 BPM | SYSTOLIC BLOOD PRESSURE: 140 MMHG | RESPIRATION RATE: 20 BRPM | DIASTOLIC BLOOD PRESSURE: 70 MMHG

## 2022-01-01 VITALS — HEART RATE: 80 BPM | SYSTOLIC BLOOD PRESSURE: 140 MMHG | DIASTOLIC BLOOD PRESSURE: 80 MMHG | RESPIRATION RATE: 16 BRPM

## 2022-01-01 VITALS — RESPIRATION RATE: 16 BRPM | HEART RATE: 76 BPM | SYSTOLIC BLOOD PRESSURE: 130 MMHG | DIASTOLIC BLOOD PRESSURE: 60 MMHG

## 2022-01-01 VITALS — RESPIRATION RATE: 20 BRPM | HEART RATE: 76 BPM

## 2022-01-01 VITALS — SYSTOLIC BLOOD PRESSURE: 130 MMHG | DIASTOLIC BLOOD PRESSURE: 60 MMHG | HEART RATE: 80 BPM

## 2022-01-01 VITALS — SYSTOLIC BLOOD PRESSURE: 150 MMHG | DIASTOLIC BLOOD PRESSURE: 80 MMHG | HEART RATE: 80 BPM | RESPIRATION RATE: 16 BRPM

## 2022-01-01 VITALS — SYSTOLIC BLOOD PRESSURE: 100 MMHG | DIASTOLIC BLOOD PRESSURE: 60 MMHG | HEART RATE: 78 BPM | RESPIRATION RATE: 18 BRPM

## 2022-01-01 VITALS — HEART RATE: 76 BPM | RESPIRATION RATE: 16 BRPM

## 2022-01-01 VITALS — SYSTOLIC BLOOD PRESSURE: 130 MMHG | RESPIRATION RATE: 10 BRPM | HEART RATE: 90 BPM | DIASTOLIC BLOOD PRESSURE: 68 MMHG

## 2022-01-01 DIAGNOSIS — C02.9 SQUAMOUS CELL CANCER OF TONGUE (HCC): ICD-10-CM

## 2022-01-01 PROCEDURE — S9126 HOSPICE CARE, IN THE HOME, P: HCPCS

## 2022-01-01 PROCEDURE — G0299 HHS/HOSPICE OF RN EA 15 MIN: HCPCS

## 2022-01-01 RX ORDER — LORAZEPAM 2 MG/ML
1-2 CONCENTRATE ORAL
Qty: 30 ML | Refills: 0 | Status: SHIPPED | OUTPATIENT
Start: 2022-01-01 | End: 2023-02-27

## 2022-01-01 RX ORDER — MORPHINE SULFATE 100 MG/5ML
10 SOLUTION ORAL
Qty: 30 ML | Refills: 0 | Status: SHIPPED | OUTPATIENT
Start: 2022-01-01 | End: 2023-03-02

## 2022-01-01 RX ORDER — LEVOTHYROXINE SODIUM 50 UG/1
TABLET ORAL
Qty: 90 TABLET | Refills: 0 | OUTPATIENT
Start: 2022-01-01

## 2022-01-01 RX ORDER — MORPHINE SULFATE 100 MG/5ML
10 SOLUTION ORAL
Qty: 30 ML | Refills: 0 | Status: SHIPPED | OUTPATIENT
Start: 2022-01-01 | End: 2022-01-01

## 2022-01-01 RX ORDER — HALOPERIDOL 2 MG/ML
1 SOLUTION ORAL EVERY 6 HOURS PRN
Qty: 120 ML | Refills: 3 | Status: SHIPPED | OUTPATIENT
Start: 2022-01-01 | End: 2022-01-01 | Stop reason: SDUPTHER

## 2022-01-01 RX ORDER — HALOPERIDOL 2 MG/ML
4 SOLUTION ORAL EVERY 4 HOURS PRN
Qty: 30 ML | Refills: 2 | Status: SHIPPED | OUTPATIENT
Start: 2022-01-01 | End: 2022-01-01 | Stop reason: SDUPTHER

## 2022-01-01 RX ORDER — HALOPERIDOL 2 MG/ML
1-2 SOLUTION ORAL EVERY 6 HOURS PRN
Qty: 120 ML | Refills: 3 | Status: SHIPPED
Start: 2022-01-01 | End: 2022-01-01

## 2022-01-01 RX ORDER — MORPHINE SULFATE 100 MG/5ML
5 SOLUTION ORAL
Qty: 30 ML | Refills: 0 | Status: SHIPPED | OUTPATIENT
Start: 2022-01-01 | End: 2022-01-01 | Stop reason: SDUPTHER

## 2022-01-01 RX ORDER — HALOPERIDOL 2 MG/ML
4 SOLUTION ORAL
Qty: 30 ML | Refills: 2 | Status: SHIPPED | OUTPATIENT
Start: 2022-01-01

## 2022-01-01 RX ADMIN — MORPHINE SULFATE 10 MG: 100 SOLUTION ORAL at 10:23

## 2022-01-01 RX ADMIN — HALOPERIDOL 4 MG: 2 SOLUTION ORAL at 10:23

## 2022-01-01 RX ADMIN — HALOPERIDOL 4 MG: 2 SOLUTION ORAL at 10:32

## 2022-01-01 RX ADMIN — MORPHINE SULFATE 5 MG: 100 SOLUTION ORAL at 10:32

## 2022-01-01 ASSESSMENT — PAIN SCALES - PAIN ASSESSMENT IN ADVANCED DEMENTIA (PAINAD)
NEGVOCALIZATION: 0 - NONE.
FACIALEXPRESSION: 1 - SAD. FRIGHTENED. FROWN.
CONSOLABILITY: 1 - DISTRACTED OR REASSURED BY VOICE OR TOUCH.
TOTALSCORE: 0
NEGVOCALIZATION: 0 - NONE.
BODYLANGUAGE: 1 - TENSE. DISTRESSED PACING. FIDGETING.
TOTALSCORE: 7
FACIALEXPRESSION: 0 - SMILING OR INEXPRESSIVE.
BODYLANGUAGE: 0 - RELAXED.
CONSOLABILITY: 0 - NO NEED TO CONSOLE.
NEGVOCALIZATION: 1 - OCCASIONAL MOAN OR GROAN. LOW-LEVEL SPEECH WITH A NEGATIVE OR DISAPPROVING QUALITY.
FACIALEXPRESSION: 2 - FACIAL GRIMACING.
NEGVOCALIZATION: 0 - NONE.
FACIALEXPRESSION: 0 - SMILING OR INEXPRESSIVE.
NEGVOCALIZATION: 2 - REPEATED TROUBLE CALLING OUT. LOUD MOANING OR GROANING. CRYING.
BODYLANGUAGE: 0 - RELAXED.
CONSOLABILITY: 0 - NO NEED TO CONSOLE.
FACIALEXPRESSION: 0 - SMILING OR INEXPRESSIVE.
BODYLANGUAGE: 0 - RELAXED.
CONSOLABILITY: 0 - NO NEED TO CONSOLE.
COMMENTS: 7/10
BODYLANGUAGE: 0 - RELAXED.
NEGVOCALIZATION: 0 - NONE.
TOTALSCORE: 0
TOTALSCORE: 0
BODYLANGUAGE: 0 - RELAXED.
NEGVOCALIZATION: 2 - REPEATED TROUBLE CALLING OUT. LOUD MOANING OR GROANING. CRYING.
BODYLANGUAGE: 1 - TENSE. DISTRESSED PACING. FIDGETING.
TOTALSCORE: 4
CONSOLABILITY: 0 - NO NEED TO CONSOLE.
CONSOLABILITY: 2 - UNABLE TO CONSOLE, DISTRACT OR REASSURE.
CONSOLABILITY: 1 - DISTRACTED OR REASSURED BY VOICE OR TOUCH.
CONSOLABILITY: 0 - NO NEED TO CONSOLE.
FACIALEXPRESSION: 0 - SMILING OR INEXPRESSIVE.
TOTALSCORE: 0
FACIALEXPRESSION: 0 - SMILING OR INEXPRESSIVE.
TOTALSCORE: 0
TOTALSCORE: 1
BODYLANGUAGE: 1 - TENSE. DISTRESSED PACING. FIDGETING.
TOTALSCORE: 6
BODYLANGUAGE: 0 - RELAXED.
BODYLANGUAGE: 0 - RELAXED.
CONSOLABILITY: 0 - NO NEED TO CONSOLE.
FACIALEXPRESSION: 0 - SMILING OR INEXPRESSIVE.
FACIALEXPRESSION: 0 - SMILING OR INEXPRESSIVE.
NEGVOCALIZATION: 0 - NONE.
FACIALEXPRESSION: 2 - FACIAL GRIMACING.

## 2022-01-01 ASSESSMENT — ENCOUNTER SYMPTOMS
SLEEP QUALITY: POOR
UNABLE TO COMMUNICATE PAIN: 1
FATIGUES EASILY: 1
DENIES PAIN: 1
FATIGUES EASILY: 1
DESCRIPTION OF MEMORY LOSS: LONG TERM
LAST BOWEL MOVEMENT: 66155
LAST BOWEL MOVEMENT: 66159
STOOL FREQUENCY: LESS THAN DAILY
STOOL FREQUENCY: LESS THAN DAILY
UNABLE TO COMMUNICATE PAIN: 1
SLEEP QUALITY: FAIR
DESCRIPTION OF MEMORY LOSS: IMMEDIATE
FATIGUE: 1
DESCRIPTION OF MEMORY LOSS: IMMEDIATE
BOWEL INCONTINENCE: 1
FATIGUE: 1
STOOL FREQUENCY: LESS THAN DAILY
UNABLE TO COMMUNICATE PAIN: 1
SLEEP QUALITY: FAIR
LAST BOWEL MOVEMENT: 66140
DENIES PAIN: 1
DESCRIPTION OF MEMORY LOSS: SHORT TERM
FATIGUES EASILY: 1
FATIGUE: 1
LAST BOWEL MOVEMENT: 66126
STOOL FREQUENCY: LESS THAN DAILY
DENIES PAIN: 1
LAST BOWEL MOVEMENT: 66150
UNABLE TO COMMUNICATE PAIN: 1
FATIGUES EASILY: 1
FATIGUE: 1
STOOL FREQUENCY: LESS THAN DAILY
STOOL FREQUENCY: LESS THAN DAILY
BOWEL INCONTINENCE: 1
DESCRIPTION OF MEMORY LOSS: IMMEDIATE
BOWEL INCONTINENCE: 1
LAST BOWEL MOVEMENT: 66159
STOOL FREQUENCY: LESS THAN DAILY
DESCRIPTION OF MEMORY LOSS: SHORT TERM
DESCRIPTION OF MEMORY LOSS: SHORT TERM
STOOL FREQUENCY: LESS THAN DAILY
FATIGUE: 1
SLEEP QUALITY: FAIR
DESCRIPTION OF MEMORY LOSS: SHORT TERM
STOOL FREQUENCY: LESS THAN DAILY
SLEEP QUALITY: FAIR
DESCRIPTION OF MEMORY LOSS: SHORT TERM
UNABLE TO COMMUNICATE PAIN: 1
PERSON REPORTING PAIN: DIRECT OBSERVATION
FATIGUE: 1
FATIGUES EASILY: 1
SLEEP QUALITY: FAIR
DENIES PAIN: 1
LAST BOWEL MOVEMENT: 66159
UNABLE TO COMMUNICATE PAIN: 1
UNABLE TO COMMUNICATE PAIN: 1
FATIGUE: 1
LAST BOWEL MOVEMENT: 66163
STOOL FREQUENCY: LESS THAN DAILY
DESCRIPTION OF MEMORY LOSS: LONG TERM
BOWEL INCONTINENCE: 1
DESCRIPTION OF MEMORY LOSS: IMMEDIATE
DESCRIPTION OF MEMORY LOSS: LONG TERM
BOWEL INCONTINENCE: 1
STOOL FREQUENCY: LESS THAN DAILY
DESCRIPTION OF MEMORY LOSS: SHORT TERM
UNABLE TO COMMUNICATE PAIN: 1
FATIGUE: 1
DESCRIPTION OF MEMORY LOSS: LONG TERM
UNABLE TO COMMUNICATE PAIN: 1
SLEEP QUALITY: FAIR
DESCRIPTION OF MEMORY LOSS: IMMEDIATE
FATIGUES EASILY: 1
DENIES PAIN: 1
DESCRIPTION OF MEMORY LOSS: LONG TERM
FATIGUE: 1
FATIGUE: 1
DESCRIPTION OF MEMORY LOSS: IMMEDIATE
BOWEL INCONTINENCE: 1
LAST BOWEL MOVEMENT: 66134
DESCRIPTION OF MEMORY LOSS: IMMEDIATE
LAST BOWEL MOVEMENT: 66119
LAST BOWEL MOVEMENT: 66111
FATIGUE: 1
BOWEL INCONTINENCE: 1
DENIES PAIN: 1
DESCRIPTION OF MEMORY LOSS: SHORT TERM
SLEEP QUALITY: POOR
BOWEL INCONTINENCE: 1
DESCRIPTION OF MEMORY LOSS: LONG TERM
SLEEP QUALITY: FAIR
BOWEL INCONTINENCE: 1
SLEEP QUALITY: FAIR
FATIGUES EASILY: 1
DESCRIPTION OF MEMORY LOSS: LONG TERM
UNABLE TO COMMUNICATE PAIN: 1
FATIGUES EASILY: 1

## 2022-01-01 ASSESSMENT — ACTIVITIES OF DAILY LIVING (ADL)
MONEY MANAGEMENT (EXPENSES/BILLS): TOTALLY DEPENDENT

## 2022-01-01 ASSESSMENT — SOCIAL DETERMINANTS OF HEALTH (SDOH)
ACTIVE STRESSOR - HEALTH CHANGES: 1

## 2022-02-21 NOTE — PROGRESS NOTES
TC from [facility]. Patient with increasing agitation, yelling out, not sleeping per wife. Increased Haldol to 4mg prn l9thdiw. Nurse instructed to give dose of 10mg of Roxanol and increase dose to same. Wife reports the 5mg dose was helpful last pm but was not long enough.   Plan:  RNCM to give Roxanol 10mg dose now and Haldol 4mg dose now and call wife later to assess via phone.   May be early transitioning.

## 2022-02-27 NOTE — PROGRESS NOTES
Agitation is refractory to scheduled seroquel and recently increased frequency of haldol. Initiating lorazepm PRN for sedation and will reassess in case GIP needed for agitation. POC discussed with Hospice RN Sahra Landaverde.

## 2024-07-11 NOTE — IP AVS SNAPSHOT
" <p align=\"LEFT\"><IMG SRC=\"//[email or web address]\" alt=\"Image\" WIDTH=\"50%\" HEIGHT=\"200\" BORDER=\"\"></p>                   Name:Santosh Casas  Medical Record Number:[id number]  CSN: [id number]    YOB: 1934   Age: 83 y.o.  Sex: male  HT:1.829 m (6') WT: 59 kg (130 lb 1.1 oz)          Admit Date: 5/11/2017     Discharge Date:   Today's Date: 5/22/2017  Attending Doctor:  Janet Wheeler M.D.                  Allergies:  Nkda          Your appointments     Jun 16, 2017  9:40 AM   Established Patient with GARCIA Valentine   [facility] (--)    [address]  [address]  [place] [address] [phone]           You will be receiving a confirmation call a few days before your appointment from our automated call confirmation system.            Jul 17, 2017 10:15 AM   New Patient with Giovani Sanders M.D.   [facility] (--)    [address]  [place] [address] [phone]           Please bring Photo ID, Insurance Cards, All Medication Bottles and copies of any legal documents (such as Living Will, Power of ) If speaking a language besides English please bring an adult . Please arrive 30 minutes prior for check in and registration. You will be receiving a confirmation call a few days before your appointment from our automated call confirmation system.                 Medication List      Take these Medications        Instructions    amlodipine 5 MG Tabs   Commonly known as:  NORVASC    Take 1 Tab by mouth every day.   Dose:  5 mg       divalproex  MG Tb24   Commonly known as:  DEPAKOTE ER    Take 1 Tab by mouth every day.   Dose:  250 mg       docusate sodium 100 MG Caps   Commonly known as:  COLACE    Take 1 Cap by mouth 2 times a day.   Dose:  100 mg       duloxetine 60 MG Cpep delayed-release capsule   Commonly known as:  CYMBALTA    Take 1 Cap by mouth every day.   Dose:  60 mg       famotidine 20 MG " Tabs   Commonly known as:  PEPCID    Take 1 Tab by mouth 2 times a day.   Dose:  20 mg       ferrous sulfate 325 (65 FE) MG tablet    Take 1 Tab by mouth every day. With 250 mg vit C and a stool softner such as colace   Dose:  325 mg       levothyroxine 50 MCG Tabs   Commonly known as:  SYNTHROID    Take 1 Tab by mouth Every morning on an empty stomach.   Dose:  50 mcg       lisinopril 10 MG Tabs   Commonly known as:  PRINIVIL    Take 1 Tab by mouth every day.   Dose:  10 mg       oxycodone immediate release 10 MG immediate release tablet   Commonly known as:  ROXICODONE    Doctor's comments:  May fill 5/17/2017   Take 1 Tab by mouth every four hours as needed for Severe Pain.   Dose:  10 mg       psyllium 58.12 % Pack   Commonly known as:  METAMUCIL    Take 1 Packet by mouth every day.   Dose:  1 Packet       quetiapine 300 MG tablet   What changed:    - medication strength  - how much to take   Commonly known as:  SEROQUEL    Take 1 Tab by mouth every evening.   Dose:  300 mg       tamsulosin 0.4 MG capsule   Commonly known as:  FLOMAX    Take 1 Cap by mouth ONE-HALF HOUR AFTER BREAKFAST.   Dose:  0.4 mg          weight-bearing as tolerated

## 2024-11-19 NOTE — ED PROVIDER NOTES
ED Provider Note  CHIEF COMPLAINT  Chief Complaint   Patient presents with   • Fall     left femur pain       HPI  Santosh Casas is a 83 y.o. male who presents to the emergency departmentComplaining of left femur pain. The patient states that he awoke, tried [place] to the restroom and Ak?Lex's walker is in a wheelchair, Anu by himself and fell down onto his left hip. He complains of severe left hip pain. Pain increases with movement and decreases with rest. He fractured his femur in May 2017 and has a chirag placed by Dr. Pantoja. Nice headache, neck pain, fever, shakes, chills, sweats, nausea, vomiting, back pain, abdominal pain.    REVIEW OF SYSTEMS  Positives as above. Pertinent negatives include dysuria, rash, lightheadedness or dizziness, loss of sensation or strength in arms or legs  All other review of systems are negative    PAST MEDICAL HISTORY  Past Medical History:   Diagnosis Date   • Constipation 8/28/2013   • Arthritis    • Bronchitis    • CATARACT     IOL right eye   • Coughing blood    • Dental disorder    • Diabetes    • Disease related peripheral neuropathy (CMS-HCC)    • Other specified disorder of intestines     occassional constipation   • Pain    • Psychiatric disorder     depression. insomnia   • Sleep apnea    • Thyroid disease    • Unspecified urinary incontinence    • Urinary bladder disorder        FAMILY HISTORY  Noncontributory    SOCIAL HISTORY  Social History     Social History   • Marital status:      Spouse name: N/A   • Number of children: N/A   • Years of education: N/A     Social History Main Topics   • Smoking status: Former Smoker     Packs/day: 1.00     Years: 10.00     Types: Cigarettes     Quit date: 1/1/1965   • Smokeless tobacco: Never Used   • Alcohol use No   • Drug use: No   • Sexual activity: Not on file      Comment:      Other Topics Concern   •  Service No   • Blood Transfusions No   • Caffeine Concern No   • Occupational Exposure No   • Hobby  How Severe Is Your Skin Lesion?: moderate "Hazards No   • Sleep Concern No   • Stress Concern No   • Weight Concern Yes     losing weight    • Special Diet No   • Back Care Yes   • Exercise No   • Bike Helmet No     does not ride bike    • Seat Belt Yes   • Self-Exams No     Social History Narrative   • No narrative on file       SURGICAL HISTORY  Past Surgical History:   Procedure Laterality Date   • HIP DHS IMHS GAMMA Left 5/11/2017    Procedure: HIP DHS;  Surgeon: Juan Daniel Pantoja M.D.;  Location: SURGERY [facility];  Service:    • LARYNGOSCOPY  5/18/2012    Performed by AISHA MCMILLAN at SURGERY SAME DAY [facility] ORS   • ESOPHAGOSCOPY  5/18/2012    Performed by AISHA MCMILLAN at SURGERY SAME DAY [facility] ORS   • CHOLECYSTECTOMY         CURRENT MEDICATIONS  Home Medications    **Home medications have not yet been reviewed for this encounter**         ALLERGIES  Allergies   Allergen Reactions   • Nkda [No Known Drug Allergy]        PHYSICAL EXAM  VITAL SIGNS: /94   Pulse 90   Temp 36 °C (96.8 °F)   Resp 16   Ht 1.753 m (5' 9\")   Wt 49.9 kg (110 lb)   SpO2 98%   BMI 16.24 kg/m²    Constitutional: Well developed, Well nourished, No acute distress, Non-toxic appearance.   Eyes: PERRLA, EOMI, Conjunctiva normal, No discharge.   Cardiovascular: Normal heart rate, Normal rhythm, No murmurs, No rubs, No gallops, and intact distal pulses.   Thorax & Lungs:  No respiratory distress, no rales, no rhonchi, No wheezing, No chest wall tenderness.   Abdomen: Bowel sounds normal, Soft, No tenderness, No guarding, No rebound, No pulsatile masses.   Skin: Warm, Dry, No erythema, pallor throughout,  Extremities: Decreased range of motion in the left lower extremity secondary to anal, tenderness proximal femur, no deformity, distal dorsalis pedis and posterior tibial pulses are brisk left lower extremity, pelvis is stable  Neurologic: Alert & oriented x 3, plantarflexion and dorsiflexion 5/5 left lower extremity, sensation intact left lower " Is This A New Presentation, Or A Follow-Up?: Skin Lesion extremity.  Psychiatric: Affect normal for clinical presentation.      RADIOLOGY/PROCEDURES  DX-FEMUR-2+ LEFT   Final Result      No radiographic evidence of acute traumatic injury left femur. Left hip DHS device in place.      DX-PELVIS-1 OR 2 VIEWS   Final Result      No acute left hip fracture or dislocation.        COURSE & MEDICAL DECISION MAKING  Pertinent Labs & Imaging studies reviewed. (See chart for details)  This is a charming 83-year-old male with fall resulting in contusion to the left femur. X-ray was negative for fracture of the pelvis and femur. I did discuss the x-ray findings with the radiologist and reviewed the x-ray at length. I reevaluated the patient had full range of motion of left upper extremity without significant tenderness. Difficulty. The patient is wheelchair bound in up with assistance. Here in the emergency department, he has no focal neurological vascular deficits. He is instructed to follow-up with emergency department as increasing symptomatology    Discharge Medication List as of 10/14/2017  3:01 PM          FINAL IMPRESSION     1. Contusion of left lower extremity, initial encounter     The patient will return for new or worsening symptoms and is stable at the time of discharge.    The patient is referred to a primary physician for blood pressure management, diabetic screening, and for all other preventative health concerns.    DISPOSITION:  Patient will be discharged home in stable condition.    FOLLOW UP:  [facility], Emergency Dept  [address] [id number] [phone]    If symptoms worsen    Juan Daniel Pantoja M.D.  [address]  [address]  [place] [address]  [phone]    Schedule an appointment as soon as possible for a visit in 1 week  As needed      OUTPATIENT MEDICATIONS:  Discharge Medication List as of 10/14/2017  3:01 PM                 Electronically signed by: Yuan Guerra, 10/14/2017 1:01 PM

## (undated) DEVICE — CHLORAPREP 26 ML APPLICATOR - ORANGE TINT(25/CA)

## (undated) DEVICE — DRAPE C-ARM LARGE 41IN X 74 IN - (10/BX 2BX/CA)

## (undated) DEVICE — CANISTER SUCTION 3000ML MECHANICAL FILTER AUTO SHUTOFF MEDI-VAC NONSTERILE LF DISP  (40EA/CA)

## (undated) DEVICE — SPONGE GAUZE STER 4X4 8-PL - (2/PK 50PK/BX 12BX/CS)

## (undated) DEVICE — GLOVE BIOGEL SZ 7 SURGICAL PF LTX - (50PR/BX 4BX/CA)

## (undated) DEVICE — TUBING CLEARLINK DUO-VENT - C-FLO (48EA/CA)

## (undated) DEVICE — DRAPE U ORTHOPEDIC - (10/BX)

## (undated) DEVICE — DRESSING TRANSPARENT FILM TEGADERM 4 X 4.75" (50EA/BX)"

## (undated) DEVICE — PROTECTOR ULNA NERVE - (36PR/CA)

## (undated) DEVICE — DRILL BIT 3.2X145 QC OIC - (5TX2=10)

## (undated) DEVICE — LEAD SET 6 DISP. EKG NIHON KOHDEN

## (undated) DEVICE — ELECTRODE 850 FOAM ADHESIVE - HYDROGEL RADIOTRNSPRNT (50/PK)

## (undated) DEVICE — PACK MAJOR ORTHO - (2EA/CA)

## (undated) DEVICE — SODIUM CHL IRRIGATION 0.9% 1000ML (12EA/CA)

## (undated) DEVICE — KIT ROOM DECONTAMINATION

## (undated) DEVICE — TUBE E-T HI-LO CUFF 7.0MM (10EA/PK)

## (undated) DEVICE — DRAPE LARGE 3 QUARTER - (20/CA)

## (undated) DEVICE — LACTATED RINGERS INJ 1000 ML - (14EA/CA 60CA/PF)

## (undated) DEVICE — GLOVE BIOGEL PI ORTHO SZ 6 1/2 SURGICAL PF LF (40PR/BX)

## (undated) DEVICE — SUTURE 1 VICRYL PLUS CT-1 - 18 INCH (12/BX)

## (undated) DEVICE — DRAPE C ARMOR (12EA/CA)

## (undated) DEVICE — GOWN SURGEONS X-LARGE - DISP. (30/CA)

## (undated) DEVICE — ELECTRODE DUAL RETURN W/ CORD - (50/PK)

## (undated) DEVICE — GLOVE BIOGEL SZ 7.5 SURGICAL PF LTX - (50PR/BX 4BX/CA)

## (undated) DEVICE — BLANKET WARMING UPPER BODY - (10/CA)

## (undated) DEVICE — KIT ANESTHESIA W/CIRCUIT & 3/LT BAG W/FILTER (20EA/CA)

## (undated) DEVICE — GLOVE BIOGEL INDICATOR SZ 7.5 SURGICAL PF LTX - (50PR/BX 4BX/CA)

## (undated) DEVICE — DRAPE 36X28IN RAD CARM BND BG - (25/CA) O

## (undated) DEVICE — SUCTION INSTRUMENT YANKAUER BULBOUS TIP W/O VENT (50EA/CA)

## (undated) DEVICE — MASK ANESTHESIA ADULT  - (100/CA)

## (undated) DEVICE — SUTURE 2-0 VICRYL PLUS CT-1 - 8 X 18 INCH(12/BX)

## (undated) DEVICE — HEAD HOLDER JUNIOR/ADULT

## (undated) DEVICE — TOWELS CLOTH SURGICAL - (4/PK 20PK/CA)

## (undated) DEVICE — DRAPE SURGICAL U 77X120 - (10/CA)

## (undated) DEVICE — SUTURE GENERAL

## (undated) DEVICE — GLOVE BIOGEL PI INDICATOR SZ 7.0 SURGICAL PF LF - (50/BX 4BX/CA)

## (undated) DEVICE — SET LEADWIRE 5 LEAD BEDSIDE DISPOSABLE ECG (1SET OF 5/EA)

## (undated) DEVICE — NEPTUNE 4 PORT MANIFOLD - (20/PK)